# Patient Record
Sex: MALE | Race: WHITE | HISPANIC OR LATINO | Employment: OTHER | URBAN - METROPOLITAN AREA
[De-identification: names, ages, dates, MRNs, and addresses within clinical notes are randomized per-mention and may not be internally consistent; named-entity substitution may affect disease eponyms.]

---

## 2017-09-21 ENCOUNTER — OFFICE VISIT (OUTPATIENT)
Dept: MEDICAL GROUP | Facility: MEDICAL CENTER | Age: 72
End: 2017-09-21
Payer: MEDICARE

## 2017-09-21 VITALS
WEIGHT: 179.23 LBS | BODY MASS INDEX: 25.66 KG/M2 | RESPIRATION RATE: 14 BRPM | OXYGEN SATURATION: 97 % | HEART RATE: 60 BPM | HEIGHT: 70 IN | TEMPERATURE: 97.6 F

## 2017-09-21 DIAGNOSIS — Z13.6 SCREENING FOR CARDIOVASCULAR CONDITION: ICD-10-CM

## 2017-09-21 DIAGNOSIS — Z86.69 H/O RETINAL DETACHMENT: ICD-10-CM

## 2017-09-21 DIAGNOSIS — Z00.00 HEALTH CARE MAINTENANCE: ICD-10-CM

## 2017-09-21 DIAGNOSIS — N40.0 BPH WITHOUT URINARY OBSTRUCTION: ICD-10-CM

## 2017-09-21 DIAGNOSIS — K21.9 GASTROESOPHAGEAL REFLUX DISEASE WITHOUT ESOPHAGITIS: ICD-10-CM

## 2017-09-21 DIAGNOSIS — K58.0 IRRITABLE BOWEL SYNDROME WITH DIARRHEA: ICD-10-CM

## 2017-09-21 DIAGNOSIS — Z12.11 COLON CANCER SCREENING: ICD-10-CM

## 2017-09-21 DIAGNOSIS — E78.5 HYPERLIPIDEMIA, UNSPECIFIED HYPERLIPIDEMIA TYPE: ICD-10-CM

## 2017-09-21 DIAGNOSIS — B19.20 HEPATITIS C VIRUS INFECTION, UNSPECIFIED CHRONICITY: ICD-10-CM

## 2017-09-21 PROCEDURE — G0008 ADMIN INFLUENZA VIRUS VAC: HCPCS | Performed by: FAMILY MEDICINE

## 2017-09-21 PROCEDURE — 90662 IIV NO PRSV INCREASED AG IM: CPT | Performed by: FAMILY MEDICINE

## 2017-09-21 PROCEDURE — 99203 OFFICE O/P NEW LOW 30 MIN: CPT | Mod: 25 | Performed by: FAMILY MEDICINE

## 2017-09-21 RX ORDER — LOPERAMIDE HYDROCHLORIDE 2 MG/1
2 CAPSULE ORAL 2 TIMES DAILY
Qty: 180 CAP | Refills: 1 | Status: SHIPPED | OUTPATIENT
Start: 2017-09-21 | End: 2018-05-31 | Stop reason: SDUPTHER

## 2017-09-21 RX ORDER — DOXAZOSIN 2 MG/1
2 TABLET ORAL DAILY
COMMUNITY
End: 2017-09-21 | Stop reason: SDUPTHER

## 2017-09-21 RX ORDER — RANITIDINE 150 MG/1
150 TABLET ORAL 2 TIMES DAILY
Qty: 180 TAB | Refills: 1 | Status: SHIPPED | OUTPATIENT
Start: 2017-09-21

## 2017-09-21 RX ORDER — LOPERAMIDE HYDROCHLORIDE 2 MG/1
2 CAPSULE ORAL 4 TIMES DAILY PRN
COMMUNITY
End: 2017-09-21 | Stop reason: SDUPTHER

## 2017-09-21 RX ORDER — DOXAZOSIN 2 MG/1
2 TABLET ORAL 2 TIMES DAILY
Qty: 180 TAB | Refills: 1 | Status: SHIPPED | OUTPATIENT
Start: 2017-09-21 | End: 2018-04-10 | Stop reason: SDUPTHER

## 2017-09-21 RX ORDER — RANITIDINE 150 MG/1
150 TABLET ORAL 2 TIMES DAILY
COMMUNITY
End: 2017-09-21 | Stop reason: SDUPTHER

## 2017-09-21 ASSESSMENT — PATIENT HEALTH QUESTIONNAIRE - PHQ9: CLINICAL INTERPRETATION OF PHQ2 SCORE: 0

## 2017-09-21 NOTE — PROGRESS NOTES
CC: Establish a new PCP.    HPI:  Josh presents today to establish a new PCP.     Patient has just unlocked from [facility]. Has been active, and independent with all ADLs. Has the following medical issues    BPH without urinary obstruction, has been having less urinary symptoms with doxazosin    Gastroesophageal reflux disease, he has been doing fine on Zantac.Denies epigastric pain, and heart burn.    Hyperlipidemia, he was on Simvastatin in the past was stopped because his lipid panel had improved. Has been on diet control.    Has h/o Hep C, but patient stated that he was told it was a misdiagnosis. Has been having normal LFTs.Has past h/o drug abuse.    Irritable bowel syndrome , has been having intermittent episodes, mostly associated with diarrhea. Currently asymptomatic, but wants to have antidiarrheal always available because he gets the epiode couple of times a month.Currently denies abdominal pain, diarrhea, or constipation    Has h/o retinal detachment which affected his left eye sight. He wants a follow up with ophthalmologist.     Due for flu shot, and pneumonia vaccine. Had colonoscopy 3 yrs ago, was normal.          Patient Active Problem List    Diagnosis Date Noted   • BPH (benign prostatic hyperplasia) 05/02/2012   • COPD (chronic obstructive pulmonary disease) (CMS-HCC) 05/02/2012   • Numbness and tingling of both legs 05/23/2011   • Lumbosacral radiculopathy 05/23/2011   • Neuropathic pain 05/23/2011   • Hyperlipidemia 12/21/2010   • Migraine 09/22/2010   • PVD (peripheral vascular disease) (CMS-HCC) 09/22/2010   • Tobacco abuse 08/18/2009   • HCV (hepatitis C virus) 08/18/2009   • IVDU (intravenous drug user) 08/18/2009   • GERD (gastroesophageal reflux disease) 08/18/2009       Current Outpatient Prescriptions   Medication Sig Dispense Refill   • aspirin EC (ECOTRIN) 81 MG Tablet Delayed Response Take 81 mg by mouth every day.     • loperamide (IMODIUM) 2 MG Cap  "Take 1 Cap by mouth 2 Times a Day. 180 Cap 1   • ranitidine (ZANTAC) 150 MG Tab Take 1 Tab by mouth 2 times a day. 180 Tab 1   • doxazosin (CARDURA) 2 MG Tab Take 1 Tab by mouth 2 Times a Day. 180 Tab 1   • simvastatin (ZOCOR) 20 MG TABS TAKE ONE TABLET BY MOUTH IN THE EVENING 30 Each 5   • SPIRIVA HANDIHALER 18 MCG CAPS INHALE THE CONTENTS OF ONE CAPSULE EVERY DAY 90 Each 0   • trazodone (DESYREL) 100 MG TABS Take 100 mg by mouth every evening.       No current facility-administered medications for this visit.          Allergies as of 09/21/2017 - Reviewed 09/21/2017   Allergen Reaction Noted   • Nkda [no known drug allergy]  10/25/2007        Social History     Social History   • Marital status: Single     Spouse name: N/A   • Number of children: N/A   • Years of education: N/A     Occupational History   • Not on file.     Social History Main Topics   • Smoking status: Current Every Day Smoker     Packs/day: 0.25     Types: Cigarettes   • Smokeless tobacco: Never Used   • Alcohol use No      Comment: drinks ETOH occasionally   • Drug use: No   • Sexual activity: Not on file     Other Topics Concern   • Not on file     Social History Narrative    ** Merged History Encounter **            Family History   Problem Relation Age of Onset   • Diabetes Mother    • Diabetes Father        Past Surgical History:   Procedure Laterality Date   • CHOLECYSTECTOMY      4 years ago   • OPEN REDUCTION      ribs    • OTHER      gerd   • OTHER      catarract bilat 6/2012       ROS:  Denies any Headache, Blurred Vision, Confusion Chest pain,  Shortness of breath,  Abdominal pain, Changes of bowel or bladder, Lower ext edema, Fevers, Nights sweats, Weight Changes, Focal weakness or numbness.  All other systems are negative.    Pulse 60   Temp 36.4 °C (97.6 °F)   Resp 14   Ht 1.778 m (5' 10\")   Wt 81.3 kg (179 lb 3.7 oz)   SpO2 97%   BMI 25.72 kg/m²     Physical Exam:  Gen:         Alert and oriented, No apparent " distress.  HEENT:   Perrla, TM clear,  Oralpharynx no erythema or exudates.  Neck:       No Jugular venous distension, Lymphadenopathy, Thyromegaly, Bruits.  Lungs:     Clear to auscultation bilaterally  CV:          Regular rate and rhythm. No murmurs, rubs or gallops.  Abd:         Soft non tender, non distended. Normal active bowel sounds. No hepatosplenomegaly, No pulsatile masses.  Ext:          No clubbing, cyanosis, edema.      Assessment and Plan.   72 y.o. male     1. BPH without urinary obstruction  Less symptomatic with doxazosin    - doxazosin (CARDURA) 2 MG Tab; Take 1 Tab by mouth 2 Times a Day.  Dispense: 180 Tab; Refill: 1    2. Gastroesophageal reflux disease without esophagitis  Doing fine on Zantac.    - ranitidine (ZANTAC) 150 MG Tab; Take 1 Tab by mouth 2 times a day.  Dispense: 180 Tab; Refill: 1    3. Hyperlipidemia, unspecified hyperlipidemia type  Was on Simvastatin in the past was stopped because his lipid panel had improved. Will repeat lipid panel.  Patient is counseled about life style modification.    - LIPID PANEL  - TSH; Future    4. Hepatitis C virus infection, unspecified chronicity  Has h/o Hep C, but patient stated that he was told it was a misdiagnosis. Has been having normal LFTs.  Will repeat HCV antibody, if positive will do the quantitative RNA.    - HEP C VIRUS ANTIBODY; Future  - COMP METABOLIC PANEL; Future    5. Irritable bowel syndrome with diarrhea  Intermittent, mostly associated with diarrhea. Currently asymptomatic, but wants to have antidiarrheal always available because he gets the epiode couple of times a month.    - loperamide (IMODIUM) 2 MG Cap; Take 1 Cap by mouth 2 Times a Day.  Dispense: 180 Cap; Refill: 1    6. Health care maintenance  Due for flu shot.  Due for pneumonia vaccine, will give it next visit.  Had colonoscopy 3 yrs ago, was normal.    - INFLUENZA VACCINE, HIGH DOSE (65+ ONLY)    7. Screening for cardiovascular condition    - CBC WITH  DIFFERENTIAL; Future  - COMP METABOLIC PANEL; Future  - LIPID PANEL  - TSH; Future    8. H/O retinal detachment  Wants a follow up with ophthalmologist. Mild eye sight on the left eye.    - REFERRAL TO OPHTHALMOLOGY    9. Colon cancer screening    - OCCULT BLOOD FECES IMMUNOASSAY (FIT); Future

## 2017-09-28 ENCOUNTER — HOSPITAL ENCOUNTER (OUTPATIENT)
Dept: LAB | Facility: MEDICAL CENTER | Age: 72
End: 2017-09-28
Attending: FAMILY MEDICINE
Payer: MEDICARE

## 2017-09-28 DIAGNOSIS — Z13.6 SCREENING FOR CARDIOVASCULAR CONDITION: ICD-10-CM

## 2017-09-28 DIAGNOSIS — B19.20 HEPATITIS C VIRUS INFECTION, UNSPECIFIED CHRONICITY: ICD-10-CM

## 2017-09-28 DIAGNOSIS — E78.5 HYPERLIPIDEMIA, UNSPECIFIED HYPERLIPIDEMIA TYPE: ICD-10-CM

## 2017-09-28 LAB
ALBUMIN SERPL BCP-MCNC: 3.9 G/DL (ref 3.2–4.9)
ALBUMIN/GLOB SERPL: 1 G/DL
ALP SERPL-CCNC: 45 U/L (ref 30–99)
ALT SERPL-CCNC: 18 U/L (ref 2–50)
ANION GAP SERPL CALC-SCNC: 8 MMOL/L (ref 0–11.9)
AST SERPL-CCNC: 18 U/L (ref 12–45)
BASOPHILS # BLD AUTO: 1.1 % (ref 0–1.8)
BASOPHILS # BLD: 0.08 K/UL (ref 0–0.12)
BILIRUB SERPL-MCNC: 0.7 MG/DL (ref 0.1–1.5)
BUN SERPL-MCNC: 22 MG/DL (ref 8–22)
CALCIUM SERPL-MCNC: 9.6 MG/DL (ref 8.5–10.5)
CHLORIDE SERPL-SCNC: 106 MMOL/L (ref 96–112)
CHOLEST SERPL-MCNC: 195 MG/DL (ref 100–199)
CO2 SERPL-SCNC: 25 MMOL/L (ref 20–33)
CREAT SERPL-MCNC: 1.02 MG/DL (ref 0.5–1.4)
EOSINOPHIL # BLD AUTO: 0.16 K/UL (ref 0–0.51)
EOSINOPHIL NFR BLD: 2.2 % (ref 0–6.9)
ERYTHROCYTE [DISTWIDTH] IN BLOOD BY AUTOMATED COUNT: 45.8 FL (ref 35.9–50)
GFR SERPL CREATININE-BSD FRML MDRD: >60 ML/MIN/1.73 M 2
GLOBULIN SER CALC-MCNC: 3.8 G/DL (ref 1.9–3.5)
GLUCOSE SERPL-MCNC: 90 MG/DL (ref 65–99)
HCT VFR BLD AUTO: 48.6 % (ref 42–52)
HCV AB SER QL: REACTIVE
HDLC SERPL-MCNC: 45 MG/DL
HGB BLD-MCNC: 16.5 G/DL (ref 14–18)
IMM GRANULOCYTES # BLD AUTO: 0.05 K/UL (ref 0–0.11)
IMM GRANULOCYTES NFR BLD AUTO: 0.7 % (ref 0–0.9)
LDLC SERPL CALC-MCNC: 123 MG/DL
LYMPHOCYTES # BLD AUTO: 2.35 K/UL (ref 1–4.8)
LYMPHOCYTES NFR BLD: 32 % (ref 22–41)
MCH RBC QN AUTO: 31.7 PG (ref 27–33)
MCHC RBC AUTO-ENTMCNC: 34 G/DL (ref 33.7–35.3)
MCV RBC AUTO: 93.3 FL (ref 81.4–97.8)
MONOCYTES # BLD AUTO: 0.44 K/UL (ref 0–0.85)
MONOCYTES NFR BLD AUTO: 6 % (ref 0–13.4)
NEUTROPHILS # BLD AUTO: 4.26 K/UL (ref 1.82–7.42)
NEUTROPHILS NFR BLD: 58 % (ref 44–72)
NRBC # BLD AUTO: 0 K/UL
NRBC BLD AUTO-RTO: 0 /100 WBC
PLATELET # BLD AUTO: 257 K/UL (ref 164–446)
PMV BLD AUTO: 11.3 FL (ref 9–12.9)
POTASSIUM SERPL-SCNC: 3.6 MMOL/L (ref 3.6–5.5)
PROT SERPL-MCNC: 7.7 G/DL (ref 6–8.2)
RBC # BLD AUTO: 5.21 M/UL (ref 4.7–6.1)
SODIUM SERPL-SCNC: 139 MMOL/L (ref 135–145)
TRIGL SERPL-MCNC: 136 MG/DL (ref 0–149)
TSH SERPL DL<=0.005 MIU/L-ACNC: 1.35 UIU/ML (ref 0.3–3.7)
WBC # BLD AUTO: 7.3 K/UL (ref 4.8–10.8)

## 2017-09-28 PROCEDURE — 36415 COLL VENOUS BLD VENIPUNCTURE: CPT

## 2017-09-28 PROCEDURE — 80061 LIPID PANEL: CPT

## 2017-09-28 PROCEDURE — 87522 HEPATITIS C REVRS TRNSCRPJ: CPT

## 2017-09-28 PROCEDURE — 86803 HEPATITIS C AB TEST: CPT

## 2017-09-28 PROCEDURE — 85025 COMPLETE CBC W/AUTO DIFF WBC: CPT

## 2017-09-28 PROCEDURE — 80053 COMPREHEN METABOLIC PANEL: CPT

## 2017-09-28 PROCEDURE — 84443 ASSAY THYROID STIM HORMONE: CPT

## 2017-10-02 LAB
HCV RNA SERPL NAA+PROBE-ACNC: <15 IU/ML
HCV RNA SERPL NAA+PROBE-LOG IU: <1.2 LOG IU
HCV RNA SERPL QL NAA+PROBE: NOT DETECTED
PATHOLOGY STUDY: NORMAL

## 2017-12-21 ENCOUNTER — OFFICE VISIT (OUTPATIENT)
Dept: MEDICAL GROUP | Facility: MEDICAL CENTER | Age: 72
End: 2017-12-21
Payer: MEDICARE

## 2017-12-21 VITALS
HEART RATE: 85 BPM | RESPIRATION RATE: 16 BRPM | HEIGHT: 70 IN | OXYGEN SATURATION: 96 % | DIASTOLIC BLOOD PRESSURE: 72 MMHG | SYSTOLIC BLOOD PRESSURE: 142 MMHG | WEIGHT: 192 LBS | TEMPERATURE: 97 F | BODY MASS INDEX: 27.49 KG/M2

## 2017-12-21 DIAGNOSIS — J41.0 SMOKERS' COUGH (HCC): ICD-10-CM

## 2017-12-21 DIAGNOSIS — R05.9 COUGH: ICD-10-CM

## 2017-12-21 PROCEDURE — 99214 OFFICE O/P EST MOD 30 MIN: CPT | Performed by: FAMILY MEDICINE

## 2017-12-21 RX ORDER — GUAIFENESIN AND DEXTROMETHORPHAN HYDROBROMIDE 100; 10 MG/5ML; MG/5ML
10 SOLUTION ORAL EVERY 6 HOURS PRN
Qty: 560 ML | Refills: 0 | Status: SHIPPED | OUTPATIENT
Start: 2017-12-21 | End: 2018-02-05

## 2017-12-21 RX ORDER — PREDNISONE 10 MG/1
TABLET ORAL
Qty: 5 TAB | Refills: 0 | Status: SHIPPED | OUTPATIENT
Start: 2017-12-21 | End: 2017-12-25

## 2017-12-21 RX ORDER — AZITHROMYCIN 250 MG/1
TABLET, FILM COATED ORAL
Qty: 6 TAB | Refills: 0 | Status: SHIPPED | OUTPATIENT
Start: 2017-12-21 | End: 2018-02-05

## 2017-12-21 NOTE — PROGRESS NOTES
CC: Productive cough    HPI:   Josh presents today because he has been having cough for a week, it has been productive, with green thick phlegm, has been having subjective fever only in the first few days of the condition. Denies SOB, ad chest pain. He is a chronic smoker.Has been smoking for more than 50 yrs between 8-12 cig a day.Has not been having problems with his breathing before.      Patient Active Problem List    Diagnosis Date Noted   • BPH (benign prostatic hyperplasia) 05/02/2012   • COPD (chronic obstructive pulmonary disease) (CMS-HCC) 05/02/2012   • Numbness and tingling of both legs 05/23/2011   • Lumbosacral radiculopathy 05/23/2011   • Neuropathic pain 05/23/2011   • Hyperlipidemia 12/21/2010   • Migraine 09/22/2010   • PVD (peripheral vascular disease) (CMS-HCC) 09/22/2010   • Tobacco abuse 08/18/2009   • HCV (hepatitis C virus) 08/18/2009   • IVDU (intravenous drug user) 08/18/2009   • GERD (gastroesophageal reflux disease) 08/18/2009       Current Outpatient Prescriptions   Medication Sig Dispense Refill   • azithromycin (ZITHROMAX) 250 MG Tab 500 mg for one day, then 250 mg every day for 4 days. 6 Tab 0   • predniSONE (DELTASONE) 10 MG Tab Take 10 mg daily for 5 days. 5 Tab 0   • Dextromethorphan-Guaifenesin (TUSSIN DM)  MG/5ML Syrup Take 10 mL by mouth every 6 hours as needed. 560 mL 0   • aspirin EC (ECOTRIN) 81 MG Tablet Delayed Response Take 81 mg by mouth every day.     • loperamide (IMODIUM) 2 MG Cap Take 1 Cap by mouth 2 Times a Day. 180 Cap 1   • ranitidine (ZANTAC) 150 MG Tab Take 1 Tab by mouth 2 times a day. 180 Tab 1   • doxazosin (CARDURA) 2 MG Tab Take 1 Tab by mouth 2 Times a Day. 180 Tab 1   • simvastatin (ZOCOR) 20 MG TABS TAKE ONE TABLET BY MOUTH IN THE EVENING 30 Each 5   • SPIRIVA HANDIHALER 18 MCG CAPS INHALE THE CONTENTS OF ONE CAPSULE EVERY DAY 90 Each 0   • trazodone (DESYREL) 100 MG TABS Take 100 mg by mouth every evening.       No current facility-administered  "medications for this visit.          Allergies as of 12/21/2017 - Reviewed 12/21/2017   Allergen Reaction Noted   • Nkda [no known drug allergy]  10/25/2007        ROS: Denies any chest pain, Shortness of breath, Changes bowel or bladder, Lower extremity edema.    Physical Exam:  /72   Pulse 85   Temp 36.1 °C (97 °F)   Resp 16   Ht 1.778 m (5' 10\")   Wt 87.1 kg (192 lb)   SpO2 96%   BMI 27.55 kg/m²   Gen.: Well-developed, well-nourished, no apparent distress,pleasant and cooperative with the examination  Skin:  Warm and dry with good turgor. No rashes or suspicious lesions in visible areas  HEENT:Sinuses nontender with palpation, TMs clear, nares patent with pink mucosa and clear rhinorrhea,no septal deviation ,polyps or lesions. lips without lesions, oropharynx clear.  Neck: Trachea midline,no masses or adenopathy.   Cor: Regular rate and rhythm without murmur, gallop or rub.  Lungs: Respirations unlabored.Congested lungs, slightly decreased breath sounds bilaterally, however no wheezes, or rhonchi.  Extremities: No cyanosis, clubbing or edema.          Assessment and Plan.   72 y.o. male     1. Smokers' cough (CMS-HCC)  Probably acute bronchitis.  Oral antibiotic, steroid.  Cough suppressant.  Rest  Hydration.  RTC/ER if it persist, or gets worse.  Probably with associated COPD ( has been smoking for more than 50 yrs between 8-12 cig a day). Frank send him for PFT.    - azithromycin (ZITHROMAX) 250 MG Tab; 500 mg for one day, then 250 mg every day for 4 days.  Dispense: 6 Tab; Refill: 0  - predniSONE (DELTASONE) 10 MG Tab; Take 10 mg daily for 5 days.  Dispense: 5 Tab; Refill: 0  - Dextromethorphan-Guaifenesin (TUSSIN DM)  MG/5ML Syrup; Take 10 mL by mouth every 6 hours as needed.  Dispense: 560 mL; Refill: 0  - PULMONARY FUNCTION TESTS Test requested: Complete Pulmonary Function Test      "

## 2018-01-17 ENCOUNTER — APPOINTMENT (OUTPATIENT)
Dept: OTHER | Facility: MEDICAL CENTER | Age: 73
End: 2018-01-17
Payer: MEDICARE

## 2018-02-05 ENCOUNTER — HOSPITAL ENCOUNTER (OUTPATIENT)
Dept: OTHER | Facility: MEDICAL CENTER | Age: 73
End: 2018-02-05
Attending: FAMILY MEDICINE
Payer: MEDICARE

## 2018-02-05 ENCOUNTER — OFFICE VISIT (OUTPATIENT)
Dept: MEDICAL GROUP | Facility: MEDICAL CENTER | Age: 73
End: 2018-02-05
Payer: MEDICARE

## 2018-02-05 VITALS
RESPIRATION RATE: 15 BRPM | BODY MASS INDEX: 28.92 KG/M2 | WEIGHT: 202 LBS | HEART RATE: 72 BPM | OXYGEN SATURATION: 96 % | TEMPERATURE: 97.6 F | SYSTOLIC BLOOD PRESSURE: 124 MMHG | DIASTOLIC BLOOD PRESSURE: 84 MMHG | HEIGHT: 70 IN

## 2018-02-05 DIAGNOSIS — E78.2 MIXED HYPERLIPIDEMIA: ICD-10-CM

## 2018-02-05 DIAGNOSIS — I73.9 PVD (PERIPHERAL VASCULAR DISEASE) (HCC): ICD-10-CM

## 2018-02-05 DIAGNOSIS — Z00.00 HEALTH CARE MAINTENANCE: ICD-10-CM

## 2018-02-05 DIAGNOSIS — B19.20 HEPATITIS C VIRUS INFECTION WITHOUT HEPATIC COMA, UNSPECIFIED CHRONICITY: ICD-10-CM

## 2018-02-05 DIAGNOSIS — K21.9 GASTROESOPHAGEAL REFLUX DISEASE WITHOUT ESOPHAGITIS: ICD-10-CM

## 2018-02-05 DIAGNOSIS — J41.0 SMOKERS' COUGH (HCC): ICD-10-CM

## 2018-02-05 DIAGNOSIS — Z12.11 COLON CANCER SCREENING: ICD-10-CM

## 2018-02-05 PROCEDURE — 99214 OFFICE O/P EST MOD 30 MIN: CPT | Mod: 25 | Performed by: FAMILY MEDICINE

## 2018-02-05 PROCEDURE — 94729 DIFFUSING CAPACITY: CPT | Mod: 26 | Performed by: INTERNAL MEDICINE

## 2018-02-05 PROCEDURE — G0009 ADMIN PNEUMOCOCCAL VACCINE: HCPCS | Performed by: FAMILY MEDICINE

## 2018-02-05 PROCEDURE — 94726 PLETHYSMOGRAPHY LUNG VOLUMES: CPT | Mod: 26 | Performed by: INTERNAL MEDICINE

## 2018-02-05 PROCEDURE — 90732 PPSV23 VACC 2 YRS+ SUBQ/IM: CPT | Performed by: FAMILY MEDICINE

## 2018-02-05 PROCEDURE — 94060 EVALUATION OF WHEEZING: CPT

## 2018-02-05 PROCEDURE — 94726 PLETHYSMOGRAPHY LUNG VOLUMES: CPT

## 2018-02-05 PROCEDURE — 94729 DIFFUSING CAPACITY: CPT

## 2018-02-05 PROCEDURE — 94060 EVALUATION OF WHEEZING: CPT | Mod: 26 | Performed by: INTERNAL MEDICINE

## 2018-02-05 RX ORDER — SIMVASTATIN 10 MG
10 TABLET ORAL EVERY EVENING
Qty: 90 TAB | Refills: 1 | Status: SHIPPED | OUTPATIENT
Start: 2018-02-05 | End: 2018-07-31 | Stop reason: SDUPTHER

## 2018-02-05 ASSESSMENT — PULMONARY FUNCTION TESTS
FEV1/FVC_PERCENT_PREDICTED: 102
FVC_PERCENT_PREDICTED: 120
FEV1_PERCENT_CHANGE: -2
FEV1/FVC_PERCENT_LLN: 63
FEV1/FVC_PERCENT_CHANGE: 5
FEV1/FVC_PREDICTED: 76
FEV1: 3.58
FVC: 4.62
FEV1/FVC_PERCENT_LLN: 63
FEV1/FVC_PERCENT_PREDICTED: 101
FEV1_PREDICTED: 2.92
FVC_PERCENT_PREDICTED: 123
FVC_PREDICTED: 3.83
FEV1_PERCENT_PREDICTED: 122
FEV1_LLN: 2.44
FVC_LLN: 3.20
FEV1_LLN: 2.44
FEV1/FVC: 77.49
FEV1/FVC: 77
FEV1/FVC: 74
FEV1_PERCENT_CHANGE: 3
FEV1/FVC_PERCENT_PREDICTED: 96
FEV1/FVC_PERCENT_CHANGE: -150
FEV1/FVC_PERCENT_PREDICTED: 76
FVC_LLN: 3.20
FEV1/FVC: 73
FEV1/FVC_PERCENT_PREDICTED: 96
FVC: 4.72
FEV1_PERCENT_PREDICTED: 118
FEV1: 3.47

## 2018-02-05 NOTE — PROGRESS NOTES
CC: Hep C/ GERD/ HLD/ H/O PVD    HPI:   Josh presents today to discuss the following medical issues:    Hepatitis C virus infection without hepatic coma  Hep C antibody is reactive, but the PCR is negative . Has been having normal LFTs.Has h/o drug abuse,has been sober for many years.Drink alcohol only socially.    Gastroesophageal reflux disease without esophagitis  He has been asymptomatic. Denies epigastric pain, and heart burn. Has been on Ranitidine 150 mg bid.    Mixed hyperlipidemia  He has been tolerating the statin. Denies muscle pain LFTs has been normal, has been on Simvastatin 10 mg daily.    Peripheral vascular disease (CMS-HCC)  Patient stated that he was diagnosed with mild PVD when hge was in the [facility] , but he has been asymptomatic since he stopped the smoking. Has been on statin as well.    Due for PPSV 23, and colon cancer screening.    Patient Active Problem List    Diagnosis Date Noted   • BPH (benign prostatic hyperplasia) 05/02/2012   • COPD (chronic obstructive pulmonary disease) (CMS-HCC) 05/02/2012   • Numbness and tingling of both legs 05/23/2011   • Lumbosacral radiculopathy 05/23/2011   • Neuropathic pain 05/23/2011   • Hyperlipidemia 12/21/2010   • Migraine 09/22/2010   • PVD (peripheral vascular disease) (CMS-HCC) 09/22/2010   • Tobacco abuse 08/18/2009   • HCV (hepatitis C virus) 08/18/2009   • IVDU (intravenous drug user) 08/18/2009   • GERD (gastroesophageal reflux disease) 08/18/2009       Current Outpatient Prescriptions   Medication Sig Dispense Refill   • aspirin EC (ECOTRIN) 81 MG Tablet Delayed Response Take 81 mg by mouth every day.     • loperamide (IMODIUM) 2 MG Cap Take 1 Cap by mouth 2 Times a Day. 180 Cap 1   • ranitidine (ZANTAC) 150 MG Tab Take 1 Tab by mouth 2 times a day. 180 Tab 1   • doxazosin (CARDURA) 2 MG Tab Take 1 Tab by mouth 2 Times a Day. 180 Tab 1   • azithromycin (ZITHROMAX) 250 MG Tab 500 mg for one day, then 250 mg every day for 4 days. 6 Tab 0   •  "Dextromethorphan-Guaifenesin (TUSSIN DM)  MG/5ML Syrup Take 10 mL by mouth every 6 hours as needed. 560 mL 0   • simvastatin (ZOCOR) 20 MG TABS TAKE ONE TABLET BY MOUTH IN THE EVENING 30 Each 5   • SPIRIVA HANDIHALER 18 MCG CAPS INHALE THE CONTENTS OF ONE CAPSULE EVERY DAY 90 Each 0   • trazodone (DESYREL) 100 MG TABS Take 100 mg by mouth every evening.       No current facility-administered medications for this visit.          Allergies as of 02/05/2018 - Reviewed 02/05/2018   Allergen Reaction Noted   • Nkda [no known drug allergy]  10/25/2007        ROS: Denies any chest pain, Shortness of breath, Changes bowel or bladder, Lower extremity edema.    Physical Exam:  /84   Pulse 72   Temp 36.4 °C (97.6 °F)   Resp 15   Ht 1.778 m (5' 10\")   Wt 91.6 kg (202 lb)   SpO2 96%   BMI 28.98 kg/m²   Gen.: Well-developed, well-nourished, no apparent distress,pleasant and cooperative with the examination  Skin:  Warm and dry with good turgor. No rashes or suspicious lesions in visible areas  HEENT:Sinuses nontender with palpation, TMs clear, nares patent with pink mucosa and clear rhinorrhea,no septal deviation ,polyps or lesions. lips without lesions, oropharynx clear.  Neck: Trachea midline,no masses or adenopathy. No JVD.  Cor: Regular rate and rhythm without murmur, gallop or rub.  Lungs: Respirations unlabored.Clear to auscultation with equal breath sounds bilaterally. No wheezes, rhonchi.  Extremities: No cyanosis, clubbing or edema.            Assessment and Plan.   72 y.o. male     1. Hepatitis C virus infection without hepatic coma, unspecified chronicity  Hep C ab is reactive, but the PCR is negative . Has been having normal LFTs.  Will continue watch. Will send for a trial of Hep C treatment if PCR is positive.    2. Gastroesophageal reflux disease without esophagitis  Stable, doing fine on Ranitidine 150 mg bid.    3. Mixed hyperlipidemia  He has been tolerating the statin. Denies muscle pain LFTs " has been normal  Continue on Simvastatin 10 mg daily    4. PVD (peripheral vascular disease) (CMS-HCC)  Patient stated that he was diagnosed with mild PVD when hge was in the group home , but he has been asymptomatic since he stopped the smoking. Has been on statin as well.    5. Smokers' cough (CMS-HCC)  Resolved since he stopped  Smoking.    6. Health care maintenance    - PNEUMOCOCCAL POLYSACCHARIDE VACCINE 23-VALENT =>1YO SQ/IM    7. Colon cancer screening    - OCCULT BLOOD FECES IMMUNOASSAY (FIT); Future

## 2018-02-06 NOTE — PROCEDURES
PFT INTERPRETATION    DATE OF EXAMINATION:  02/05/2018    DATE OF INTERPRETATION:  02/05/2018, interpreted by Dr. Gondal.    INTERPRETATION:  The spirometry is not indicative of an obstructive or   restrictive ventilatory defect with normal ratio of FEV1 to forced vital   capacity.  Normal FEV1 and forced vital capacity, pre and post bronchodilator.    The lung volumes are also not suggestive of air trapping or hyperinflation   with normal total lung capacity and residual volume.  The diffusion capacity   of the lungs for carbon monoxide is also in the normal range.    IMPRESSION:  Normal study, correlate clinically.       ____________________________________     Muhammad K. Gondal, MD [facility] / JORGE    DD:  02/05/2018 17:20:59  DT:  02/05/2018 20:11:09    D#:  [id number]  Job#:  [id number]

## 2018-04-10 DIAGNOSIS — N40.0 BPH WITHOUT URINARY OBSTRUCTION: ICD-10-CM

## 2018-04-10 RX ORDER — DOXAZOSIN 2 MG/1
TABLET ORAL
Qty: 180 TAB | Refills: 1 | Status: SHIPPED | OUTPATIENT
Start: 2018-04-10 | End: 2018-10-10 | Stop reason: SDUPTHER

## 2018-05-07 ENCOUNTER — APPOINTMENT (OUTPATIENT)
Dept: MEDICAL GROUP | Facility: MEDICAL CENTER | Age: 73
End: 2018-05-07
Payer: MEDICARE

## 2018-05-31 ENCOUNTER — OFFICE VISIT (OUTPATIENT)
Dept: MEDICAL GROUP | Facility: MEDICAL CENTER | Age: 73
End: 2018-05-31
Payer: MEDICARE

## 2018-05-31 VITALS
TEMPERATURE: 98.6 F | HEIGHT: 70 IN | HEART RATE: 69 BPM | DIASTOLIC BLOOD PRESSURE: 70 MMHG | SYSTOLIC BLOOD PRESSURE: 120 MMHG | BODY MASS INDEX: 29.49 KG/M2 | WEIGHT: 206 LBS | RESPIRATION RATE: 16 BRPM | OXYGEN SATURATION: 95 %

## 2018-05-31 DIAGNOSIS — K58.0 IRRITABLE BOWEL SYNDROME WITH DIARRHEA: ICD-10-CM

## 2018-05-31 DIAGNOSIS — I73.9 PVD (PERIPHERAL VASCULAR DISEASE) (HCC): ICD-10-CM

## 2018-05-31 DIAGNOSIS — F17.200 TOBACCO DEPENDENCE: ICD-10-CM

## 2018-05-31 DIAGNOSIS — E78.2 MIXED HYPERLIPIDEMIA: ICD-10-CM

## 2018-05-31 DIAGNOSIS — N40.0 BENIGN PROSTATIC HYPERPLASIA WITHOUT LOWER URINARY TRACT SYMPTOMS: ICD-10-CM

## 2018-05-31 DIAGNOSIS — K21.9 GASTROESOPHAGEAL REFLUX DISEASE WITHOUT ESOPHAGITIS: ICD-10-CM

## 2018-05-31 PROCEDURE — 99214 OFFICE O/P EST MOD 30 MIN: CPT | Performed by: FAMILY MEDICINE

## 2018-05-31 RX ORDER — LOPERAMIDE HYDROCHLORIDE 2 MG/1
2 CAPSULE ORAL 2 TIMES DAILY
Qty: 180 CAP | Refills: 1 | Status: SHIPPED | OUTPATIENT
Start: 2018-05-31 | End: 2020-05-04

## 2018-05-31 NOTE — PROGRESS NOTES
CC: PVD, HLD, GERD, BPH, smoking, IBS.    HPI:   Josh presents today to discuss the following medical issues:    Peripheral vascular disease (CMS-[facility])   Patient stated that he was diagnosed with mild P[facility] , but he has been asymptomatic. Has been on statin as well. Still smokes but he intend to stop, wait for his wife so they can do it together.    Mixed hyperlipidemia   He has been tolerating the statin. Denies muscle pain LFTs has been normal, has been on Simvastatin 10 mg daily.     Gastroesophageal reflux disease without esophagitis   He has been asymptomatic. Denies epigastric pain, and heart burn. Has been on Ranitidine 150 mg bid.     Benign prostatic hyperplasia without lower urinary tract symptoms  Has been almost no urinary symptoms since he started the  Doxazosin.    Tobacco dependence  Smokes 1/2 a pack a day, has been thinking of smoking cessation, he will try the patches.Currently cough, and SOB    Irritable bowel syndrome with diarrhea  His IBS usually presented with pain and diarrhea, and the Imodium as needed has been helping . He is currently asymptomatic, he knows what to eat, and how to avoid IBS episodes.    Patient Active Problem List    Diagnosis Date Noted   • Irritable bowel syndrome with diarrhea 05/31/2018   • BPH (benign prostatic hyperplasia) 05/02/2012   • COPD (chronic obstructive pulmonary disease) ([facility]) 05/02/2012   • Numbness and tingling of both legs 05/23/2011   • Lumbosacral radiculopathy 05/23/2011   • Neuropathic pain 05/23/2011   • Hyperlipidemia 12/21/2010   • Migraine 09/22/2010   • PVD (peripheral vascular disease) ([facility]) 09/22/2010   • Tobacco abuse 08/18/2009   • HCV (hepatitis C virus) 08/18/2009   • IVDU (intravenous drug user) 08/18/2009   • GERD (gastroesophageal reflux disease) 08/18/2009       Current Outpatient Prescriptions   Medication Sig Dispense Refill   • doxazosin (CARDURA) 2 MG Tab TAKE ONE TABLET BY MOUTH TWICE DAILY 180 Tab 1   •  "simvastatin (ZOCOR) 10 MG Tab Take 1 Tab by mouth every evening. 90 Tab 1   • aspirin EC (ECOTRIN) 81 MG Tablet Delayed Response Take 81 mg by mouth every day.     • loperamide (IMODIUM) 2 MG Cap Take 1 Cap by mouth 2 Times a Day. 180 Cap 1   • ranitidine (ZANTAC) 150 MG Tab Take 1 Tab by mouth 2 times a day. 180 Tab 1     No current facility-administered medications for this visit.          Allergies as of 05/31/2018 - Reviewed 05/31/2018   Allergen Reaction Noted   • Nkda [no known drug allergy]  10/25/2007        ROS: Denies any chest pain, Shortness of breath, Changes bowel or bladder, Lower extremity edema.    Physical Exam:  /70   Pulse 69   Temp 37 °C (98.6 °F)   Resp 16   Ht 1.778 m (5' 10\")   Wt 93.4 kg (206 lb)   SpO2 95%   BMI 29.56 kg/m²   Gen.: Well-developed, well-nourished, no apparent distress,pleasant and cooperative with the examination  Skin:  Warm and dry with good turgor. No rashes or suspicious lesions in visible areas  HEENT:Sinuses nontender with palpation, TMs clear, nares patent with pink mucosa and clear rhinorrhea,no septal deviation ,polyps or lesions. lips without lesions, oropharynx clear.  Neck: Trachea midline,no masses or adenopathy. No JVD.  Cor: Regular rate and rhythm without murmur, gallop or rub.  Lungs: Respirations unlabored.Clear to auscultation with equal breath sounds bilaterally. No wheezes, rhonchi.  Extremities: No cyanosis, clubbing or edema.      Assessment and Plan.   72 y.o. male     1. PVD (peripheral vascular disease) (HCC)  Patient stated that he was diagnosed with mild PVD when hge was in the [facility] , but he has been asymptomatic since he stopped the smoking. Has been on statin as well    2. Mixed hyperlipidemia  Patient stated that he was diagnosed with mild PVD when hge was in the [facility] , but he has been asymptomatic since he stopped the smoking. Has been on statin as well    3. Gastroesophageal reflux disease without esophagitis  Stable, doing " fine on Ranitidine 150 mg bid.    4. Benign prostatic hyperplasia without lower urinary tract symptoms  Has been almost asymptomatic with the Doxazosin.    5. Tobacco dependence  Smokes 1/2 a pack a day, has been thinking of smoking cessation, he will try the patches.    6. Irritable bowel syndrome with diarrhea  Currently asymptomatic.  Imodium as needed has been helping

## 2018-07-31 DIAGNOSIS — I73.9 PVD (PERIPHERAL VASCULAR DISEASE) (HCC): ICD-10-CM

## 2018-07-31 DIAGNOSIS — E78.2 MIXED HYPERLIPIDEMIA: ICD-10-CM

## 2018-07-31 RX ORDER — SIMVASTATIN 10 MG
TABLET ORAL
Qty: 90 TAB | Refills: 3 | Status: SHIPPED | OUTPATIENT
Start: 2018-07-31 | End: 2019-04-25 | Stop reason: SDUPTHER

## 2018-09-17 ENCOUNTER — OFFICE VISIT (OUTPATIENT)
Dept: MEDICAL GROUP | Facility: MEDICAL CENTER | Age: 73
End: 2018-09-17
Payer: MEDICARE

## 2018-09-17 VITALS
OXYGEN SATURATION: 95 % | SYSTOLIC BLOOD PRESSURE: 128 MMHG | WEIGHT: 207.4 LBS | HEIGHT: 69 IN | DIASTOLIC BLOOD PRESSURE: 68 MMHG | BODY MASS INDEX: 30.72 KG/M2 | TEMPERATURE: 98.3 F | RESPIRATION RATE: 16 BRPM | HEART RATE: 86 BPM

## 2018-09-17 DIAGNOSIS — J44.9 CHRONIC OBSTRUCTIVE PULMONARY DISEASE, UNSPECIFIED COPD TYPE (HCC): ICD-10-CM

## 2018-09-17 DIAGNOSIS — Z23 NEED FOR VACCINATION: ICD-10-CM

## 2018-09-17 DIAGNOSIS — E66.9 OBESITY (BMI 30-39.9): ICD-10-CM

## 2018-09-17 DIAGNOSIS — E78.2 MIXED HYPERLIPIDEMIA: ICD-10-CM

## 2018-09-17 DIAGNOSIS — K21.9 GASTROESOPHAGEAL REFLUX DISEASE WITHOUT ESOPHAGITIS: ICD-10-CM

## 2018-09-17 DIAGNOSIS — N40.0 BENIGN PROSTATIC HYPERPLASIA WITHOUT LOWER URINARY TRACT SYMPTOMS: ICD-10-CM

## 2018-09-17 DIAGNOSIS — Z12.11 COLON CANCER SCREENING: ICD-10-CM

## 2018-09-17 PROCEDURE — 99214 OFFICE O/P EST MOD 30 MIN: CPT | Mod: 25 | Performed by: FAMILY MEDICINE

## 2018-09-17 PROCEDURE — 90662 IIV NO PRSV INCREASED AG IM: CPT | Performed by: FAMILY MEDICINE

## 2018-09-17 PROCEDURE — G0008 ADMIN INFLUENZA VIRUS VAC: HCPCS | Performed by: FAMILY MEDICINE

## 2018-09-17 NOTE — PROGRESS NOTES
CC: GERD, HLD, BPH, COPD    HPI:   Josh presents today to discuss the following:     Gastroesophageal reflux disease without esophagitis   He has been asymptomatic. Denies epigastric pain, and heart burn. Has been on Ranitidine 150 mg bid.      Mixed hyperlipidemia   He has been tolerating the statin. Denies muscle pain LFTs has been normal, has been on Simvastatin 10 mg daily. Patient has h/o PVD .    Benign prostatic hyperplasia without lower urinary tract symptoms  Patient stated that he has been almost has no urinary symptoms since he started the  Doxazosin.     Chronic obstructive pulmonary disease, unspecified COPD type (HCC)  Patient is asymptomatic .In the past he used Albuterol for his cough and SOB . But he has not for long time. Denies cough and SOB.he smokes 1/2 a pack a day, has been thinking of smoking cessation, he will try the patches.    Obesity (BMI 30-39.9)  BMI is 31.Patient is counseled about the medical rationale for weight loss in obese individuals is that obesity is associated with a significant increase in mortality, and many health risks including type 2 diabetes mellitus, hypertension, dyslipidemia, and coronary heart disease. The benefits of weight loss include a reduction in the rate of progression from impaired glucose tolerance to diabetes, blood pressure in hypertensive patients, and lipid levels in higher risk patients. Other noncardiac benefits of weight loss include reductions in urinary incontinence, sleep apnea, and depression, and improvements in quality of life, physical functioning, and mobility.Recommend lifestyle modification: exercise 30 minutes per day 5 days per week. Recommend also portion control.    Due for the flu shot.  Due for Colon cancer screen.    Patient Active Problem List    Diagnosis Date Noted   • Irritable bowel syndrome with diarrhea 05/31/2018   • BPH (benign prostatic hyperplasia) 05/02/2012   • COPD (chronic obstructive pulmonary disease) (HCC)  "05/02/2012   • Numbness and tingling of both legs 05/23/2011   • Lumbosacral radiculopathy 05/23/2011   • Neuropathic pain 05/23/2011   • Hyperlipidemia 12/21/2010   • Migraine 09/22/2010   • PVD (peripheral vascular disease) (HCC) 09/22/2010   • Tobacco abuse 08/18/2009   • HCV (hepatitis C virus) 08/18/2009   • IVDU (intravenous drug user) 08/18/2009   • GERD (gastroesophageal reflux disease) 08/18/2009       Current Outpatient Prescriptions   Medication Sig Dispense Refill   • simvastatin (ZOCOR) 10 MG Tab TAKE ONE TABLET BY MOUTH ONCE DAILY IN THE EVENING 90 Tab 3   • loperamide (IMODIUM) 2 MG Cap Take 1 Cap by mouth 2 Times a Day. 180 Cap 1   • doxazosin (CARDURA) 2 MG Tab TAKE ONE TABLET BY MOUTH TWICE DAILY 180 Tab 1   • aspirin EC (ECOTRIN) 81 MG Tablet Delayed Response Take 81 mg by mouth every day.     • ranitidine (ZANTAC) 150 MG Tab Take 1 Tab by mouth 2 times a day. 180 Tab 1     No current facility-administered medications for this visit.          Allergies as of 09/17/2018 - Reviewed 09/17/2018   Allergen Reaction Noted   • Nkda [no known drug allergy]  10/25/2007        ROS: Denies any chest pain, Shortness of breath, Changes bowel or bladder, Lower extremity edema.    Physical Exam:  /68   Pulse 86   Temp 36.8 °C (98.3 °F)   Resp 16   Ht 1.74 m (5' 8.5\")   Wt 94.1 kg (207 lb 6.4 oz)   SpO2 95%   BMI 31.08 kg/m²   Gen.: Well-developed, well-nourished, no apparent distress,pleasant and cooperative with the examination  Skin:  Warm and dry with good turgor. No rashes or suspicious lesions in visible areas  HEENT:Sinuses nontender with palpation, TMs clear, nares patent with pink mucosa and clear rhinorrhea,no septal deviation ,polyps or lesions. lips without lesions, oropharynx clear.  Neck: Trachea midline,no masses or adenopathy. No JVD.  Cor: Regular rate and rhythm without murmur, gallop or rub.  Lungs: Respirations unlabored.Clear to auscultation with equal breath sounds bilaterally. " No wheezes, rhonchi.  Extremities: No cyanosis, clubbing or edema.      Assessment and Plan.   73 y.o. male     1. Gastroesophageal reflux disease without esophagitis  Has been moslty asymptomatic with Zantac 150 mg bid.    2. Mixed hyperlipidemia  He has been tolerating the statin. Denies muscle pain LFTs has been normal  Continue on Simvastatin 10 mg daily.    3. Benign prostatic hyperplasia without lower urinary tract symptoms  Has been doing fine on the Doxazosin.    4. Chronic obstructive pulmonary disease, unspecified COPD type (HCC)  Mostly asymptomatic.  In the past he used Albuterol . He has not for long time      5. Tobacco dependence  Smokes 1/2 a pack a day, has tired to quit he could not. Now is not ready.    5. Need for vaccination    - INFLUENZA VACCINE, HIGH DOSE (65+ ONLY)    6. Obesity (BMI 30-39.9)  BMI is 31.  Patient is counseled about life style modification.    - Patient identified as having weight management issue.  Appropriate orders and counseling given.    7. Colon cancer screening    - OCCULT BLOOD FECES IMMUNOASSAY (FIT); Future

## 2018-09-21 ENCOUNTER — APPOINTMENT (OUTPATIENT)
Dept: LAB | Facility: MEDICAL CENTER | Age: 73
End: 2018-09-21
Attending: FAMILY MEDICINE
Payer: MEDICARE

## 2018-10-10 DIAGNOSIS — N40.0 BPH WITHOUT URINARY OBSTRUCTION: ICD-10-CM

## 2018-10-11 RX ORDER — DOXAZOSIN 2 MG/1
TABLET ORAL
Qty: 180 TAB | Refills: 3 | Status: SHIPPED | OUTPATIENT
Start: 2018-10-11 | End: 2019-10-27 | Stop reason: SDUPTHER

## 2019-01-15 ENCOUNTER — OFFICE VISIT (OUTPATIENT)
Dept: MEDICAL GROUP | Facility: MEDICAL CENTER | Age: 74
End: 2019-01-15
Payer: MEDICARE

## 2019-01-15 VITALS
RESPIRATION RATE: 16 BRPM | SYSTOLIC BLOOD PRESSURE: 130 MMHG | TEMPERATURE: 98.4 F | BODY MASS INDEX: 32.13 KG/M2 | OXYGEN SATURATION: 96 % | HEIGHT: 68 IN | WEIGHT: 212 LBS | HEART RATE: 82 BPM | DIASTOLIC BLOOD PRESSURE: 70 MMHG

## 2019-01-15 DIAGNOSIS — E78.2 MIXED HYPERLIPIDEMIA: ICD-10-CM

## 2019-01-15 DIAGNOSIS — J44.1 CHRONIC OBSTRUCTIVE PULMONARY DISEASE WITH ACUTE EXACERBATION (HCC): ICD-10-CM

## 2019-01-15 DIAGNOSIS — N40.0 BENIGN PROSTATIC HYPERPLASIA WITHOUT LOWER URINARY TRACT SYMPTOMS: ICD-10-CM

## 2019-01-15 DIAGNOSIS — I73.9 PVD (PERIPHERAL VASCULAR DISEASE) (HCC): ICD-10-CM

## 2019-01-15 DIAGNOSIS — F17.200 TOBACCO DEPENDENCE: ICD-10-CM

## 2019-01-15 PROCEDURE — 99214 OFFICE O/P EST MOD 30 MIN: CPT | Performed by: FAMILY MEDICINE

## 2019-01-15 PROCEDURE — 8041 PR SCP AHA: Performed by: FAMILY MEDICINE

## 2019-01-15 RX ORDER — ALBUTEROL SULFATE 90 UG/1
2 AEROSOL, METERED RESPIRATORY (INHALATION) EVERY 6 HOURS PRN
Qty: 8.5 G | Refills: 3 | Status: SHIPPED | OUTPATIENT
Start: 2019-01-15 | End: 2020-03-26

## 2019-01-15 RX ORDER — METHYLPREDNISOLONE 4 MG/1
TABLET ORAL
Qty: 21 TAB | Refills: 0 | Status: SHIPPED | OUTPATIENT
Start: 2019-01-15 | End: 2021-03-01

## 2019-01-15 ASSESSMENT — PATIENT HEALTH QUESTIONNAIRE - PHQ9: CLINICAL INTERPRETATION OF PHQ2 SCORE: 0

## 2019-01-15 NOTE — PROGRESS NOTES
Annual Health Assessment Questions:    1.  Are you currently engaging in any exercise or physical activity? Yes    2.  How would you describe your mood or emotional well-being today? good    3.  Have you had any falls in the last year? No    4.  Have you noticed any problems with your balance or had difficulty walking? No    5.  In the last six months have you experienced any leakage of urine? No    6. DPA/Advanced Directive: Patient does not have an Advanced Directive.  A packet and workshop information was given on Advanced Directives.         CC: COPD with exacerbation, peripheral vascular disease, hyperlipidemia, benign prostatic hypertrophy.    HPI:   Josh presents today to discuss the following medical issues:    PVD (peripheral vascular disease) ([facility])  Patient has been having pain when he walks about 2 blocks.  Denies any rest pain.  He was diagnosed with peripheral vascular disease 2 years ago with arterial ultrasound, which is not shown in the records.  He has been on aspirin and statin.  He is a chronic smoker.  He smokes about 5-6 cigarettes a day, he used to smoke a pack a day and he cut down gradually to current numbers of cigarettes.  Patient started smoking when he was 15 years old.  Smoking cessation discussed patient stated that he has been trying to do that but because he is wife is a smoker every time he quit he did relapses.    Benign prostatic hyperplasia without lower urinary tract symptoms  Patient's urinary symptoms (urgency, frequency, incomplete emptying of the bladder ) has improved since he started doxazosin 2 mg daily.    Mixed hyperlipidemia  He has been tolerating the statin. Denies muscle pain LFTs has been normal, has been on simvastatin 10 mg daily.  Has history of peripheral vascular disease.    Chronic obstructive pulmonary disease with acute exacerbation (HCC)  Patient has been having cough for about 3 weeks.  Started cough with thick phlegm that gradually cleared up in 3-5  days, however the cough continues with clear phlegm, associated with shortness of breath especially at night.  Denies any fever, chest pain, chills.  He has been using over-the-counter cough suppressant which has been helping a little bit.  In the past he used albuterol and Spiriva but he stopped it because he did not think that he needed it.he has been having normal oxygen saturation, he is not on oxygen.  He is a chronic smoker , he smokes about 5-6 cigarettes a day, he used to smoke a pack a day and he cut down gradually to current numbers of cigarettes.  Patient started smoking when he was 15 years old.  Smoking cessation discussed patient stated that he has been trying to do that but because he is wife is a smoker every time he quit he did relapses.    Patient Active Problem List    Diagnosis Date Noted   • Obesity (BMI 30-39.9) 09/17/2018   • Irritable bowel syndrome with diarrhea 05/31/2018   • BPH (benign prostatic hyperplasia) 05/02/2012   • COPD (chronic obstructive pulmonary disease) ([facility]) 05/02/2012   • Numbness and tingling of both legs 05/23/2011   • Lumbosacral radiculopathy 05/23/2011   • Neuropathic pain 05/23/2011   • Hyperlipidemia 12/21/2010   • Migraine 09/22/2010   • PVD (peripheral vascular disease) ([facility]) 09/22/2010   • Tobacco abuse 08/18/2009   • HCV (hepatitis C virus) 08/18/2009   • IVDU (intravenous drug user) 08/18/2009   • GERD (gastroesophageal reflux disease) 08/18/2009       Current Outpatient Prescriptions   Medication Sig Dispense Refill   • MethylPREDNISolone (MEDROL DOSEPAK) 4 MG Tablet Therapy Pack As directed on the packaging label. 21 Tab 0   • albuterol 108 (90 Base) MCG/ACT Aero Soln inhalation aerosol Inhale 2 Puffs by mouth every 6 hours as needed for Shortness of Breath. 8.5 g 3   • doxazosin (CARDURA) 2 MG Tab TAKE 1 TABLET BY MOUTH TWICE DAILY 180 Tab 3   • simvastatin (ZOCOR) 10 MG Tab TAKE ONE TABLET BY MOUTH ONCE DAILY IN THE EVENING 90 Tab 3   • loperamide (IMODIUM)  "2 MG Cap Take 1 Cap by mouth 2 Times a Day. 180 Cap 1   • aspirin EC (ECOTRIN) 81 MG Tablet Delayed Response Take 81 mg by mouth every day.     • ranitidine (ZANTAC) 150 MG Tab Take 1 Tab by mouth 2 times a day. 180 Tab 1     No current facility-administered medications for this visit.          Allergies as of 01/15/2019 - Reviewed 09/17/2018   Allergen Reaction Noted   • Nkda [no known drug allergy]  10/25/2007        ROS: Denies any chest pain, Shortness of breath, Changes bowel or bladder, Lower extremity edema.    Physical Exam:  /70 (BP Location: Right arm, Patient Position: Sitting, BP Cuff Size: Adult)   Pulse 82   Temp 36.9 °C (98.4 °F) (Temporal)   Resp 16   Ht 1.727 m (5' 8\")   Wt 96.2 kg (212 lb)   SpO2 96%   BMI 32.23 kg/m²   Gen.: Well-developed, well-nourished, no apparent distress,pleasant and cooperative with the examination  Skin:  Warm and dry with good turgor. No rashes or suspicious lesions in visible areas  HEENT:Sinuses nontender with palpation, TMs clear, nares patent with pink mucosa and clear rhinorrhea,no septal deviation ,polyps or lesions. lips without lesions, oropharynx clear.  Neck: Trachea midline,no masses or adenopathy. No JVD.  Cor: Regular rate and rhythm without murmur, gallop or rub.  Lungs: Respirations unlabored.decreased breath sounds bilaterally.  Scattered wheezes, no crackles.  Extremities: No cyanosis, clubbing or edema.  Decreased peripheral pulses.          Assessment and Plan.   73 y.o. male     1. PVD (peripheral vascular disease) (HCC)  Stable.  Diagnosed 2 years ago with arterial ultrasound, not shown in the records.  However patient still having claudication as described above.  Continue on aspirin and statin.  Will do arterial ultrasound.    - US-EXTREMITY ARTERY LOWER BILAT; Future    2. Benign prostatic hyperplasia without lower urinary tract symptoms  Patient has been doing fine on doxazosin 2 mg daily.    3. Mixed hyperlipidemia  He has been " tolerating the statin. Denies muscle pain LFTs has been normal  Continue on simvastatin 10 mg daily.    4. Chronic obstructive pulmonary disease with acute exacerbation (HCC)  Start patient on oral steroids tapering dose for a week, and albuterol inhaler every 6 hours for 2-3 days then every 6 hours as needed.  No sign of infection, so no need for antibiotics.  Continue on over-the-counter cough suppressant.    - MethylPREDNISolone (MEDROL DOSEPAK) 4 MG Tablet Therapy Pack; As directed on the packaging label.  Dispense: 21 Tab; Refill: 0  - albuterol 108 (90 Base) MCG/ACT Aero Soln inhalation aerosol; Inhale 2 Puffs by mouth every 6 hours as needed for Shortness of Breath.  Dispense: 8.5 g; Refill: 3    5. Tobacco dependence  Patient smokes about 5-6 cigarettes a day, he used to smoke a pack a day and he cut down gradually to current numbers of cigarettes.  Patient started smoking when he was 15 years old.  Smoking cessation discussed patient stated that he has been trying to do that but because he is wife is a smoker every time he quit he did relapses.

## 2019-01-22 ENCOUNTER — APPOINTMENT (OUTPATIENT)
Dept: RADIOLOGY | Facility: MEDICAL CENTER | Age: 74
End: 2019-01-22
Attending: FAMILY MEDICINE
Payer: MEDICARE

## 2019-04-16 ENCOUNTER — OFFICE VISIT (OUTPATIENT)
Dept: MEDICAL GROUP | Facility: MEDICAL CENTER | Age: 74
End: 2019-04-16
Payer: MEDICARE

## 2019-04-16 VITALS
HEART RATE: 65 BPM | DIASTOLIC BLOOD PRESSURE: 80 MMHG | OXYGEN SATURATION: 96 % | BODY MASS INDEX: 31.83 KG/M2 | RESPIRATION RATE: 16 BRPM | TEMPERATURE: 98 F | HEIGHT: 68 IN | SYSTOLIC BLOOD PRESSURE: 130 MMHG | WEIGHT: 210 LBS

## 2019-04-16 DIAGNOSIS — R09.89 BRUIT OF LEFT CAROTID ARTERY: ICD-10-CM

## 2019-04-16 DIAGNOSIS — Z72.0 TOBACCO ABUSE: ICD-10-CM

## 2019-04-16 DIAGNOSIS — J44.9 CHRONIC OBSTRUCTIVE PULMONARY DISEASE, UNSPECIFIED COPD TYPE (HCC): ICD-10-CM

## 2019-04-16 DIAGNOSIS — I73.9 PVD (PERIPHERAL VASCULAR DISEASE) (HCC): ICD-10-CM

## 2019-04-16 DIAGNOSIS — K21.9 GASTROESOPHAGEAL REFLUX DISEASE WITHOUT ESOPHAGITIS: ICD-10-CM

## 2019-04-16 PROCEDURE — 99214 OFFICE O/P EST MOD 30 MIN: CPT | Performed by: FAMILY MEDICINE

## 2019-04-16 NOTE — PROGRESS NOTES
CC: COPD, acid reflux, peripheral vascular disease, carotid bruit.    HPI:   Josh presents today with the following chronic medical issues:    Chronic obstructive pulmonary disease, unspecified COPD type ([facility])  Patient is currently mostly asymptomatic.  However sometimes he gets mild cough and shortness of breath early in the morning.  Has been taking albuterol every day but he stated that he really does not have severe symptoms.  Patient smokes 6 cigarettes a day, smoking cessation discussed, patient will continue trying.    Gastroesophageal reflux disease without esophagitis  Denies epigastric pain, heartburn, nausea, vomiting.  Patient has been doing fine on ranitidine 150 mg twice a day.    PVD (peripheral vascular disease) ([facility])  Patient has been having intermittent leg pain with walking about 2 blocks.  He was diagnosed 2 years ago with arterial ultrasound, not shown in the records.  Denies any change in his skin color.  He smokes about 5-6 cigarettes a day, he used to smoke a pack a day and he cut down gradually to current numbers of cigarettes.  Patient started smoking when he was 15 years old.  Smoking cessation discussed patient stated that he has been trying to do that but because he is wife is a smoker every time he quit he did relapses.  Ultrasound of the leg arteries ordered last visit but patient did not do it.  Patient stated that his wife got sick and he could not do it.    Bruit of left carotid artery  Accidental finding of left carotid bruit detected on exam.  However patient denies any dizziness, syncopal episodes.      Patient Active Problem List    Diagnosis Date Noted   • Obesity (BMI 30-39.9) 09/17/2018   • Irritable bowel syndrome with diarrhea 05/31/2018   • BPH (benign prostatic hyperplasia) 05/02/2012   • COPD (chronic obstructive pulmonary disease) ([facility]) 05/02/2012   • Numbness and tingling of both legs 05/23/2011   • Lumbosacral radiculopathy 05/23/2011   • Neuropathic pain 05/23/2011  "  • Hyperlipidemia 12/21/2010   • Migraine 09/22/2010   • PVD (peripheral vascular disease) (HCC) 09/22/2010   • Tobacco abuse 08/18/2009   • HCV (hepatitis C virus) 08/18/2009   • IVDU (intravenous drug user) 08/18/2009   • GERD (gastroesophageal reflux disease) 08/18/2009       Current Outpatient Prescriptions   Medication Sig Dispense Refill   • fluticasone-salmeterol (ADVAIR) 100-50 MCG/DOSE AEROSOL POWDER, BREATH ACTIVATED Inhale 1 Puff by mouth every 12 hours. 1 Inhaler 3   • MethylPREDNISolone (MEDROL DOSEPAK) 4 MG Tablet Therapy Pack As directed on the packaging label. (Patient not taking: Reported on 4/16/2019) 21 Tab 0   • albuterol 108 (90 Base) MCG/ACT Aero Soln inhalation aerosol Inhale 2 Puffs by mouth every 6 hours as needed for Shortness of Breath. 8.5 g 3   • doxazosin (CARDURA) 2 MG Tab TAKE 1 TABLET BY MOUTH TWICE DAILY 180 Tab 3   • simvastatin (ZOCOR) 10 MG Tab TAKE ONE TABLET BY MOUTH ONCE DAILY IN THE EVENING 90 Tab 3   • loperamide (IMODIUM) 2 MG Cap Take 1 Cap by mouth 2 Times a Day. 180 Cap 1   • aspirin EC (ECOTRIN) 81 MG Tablet Delayed Response Take 81 mg by mouth every day.     • ranitidine (ZANTAC) 150 MG Tab Take 1 Tab by mouth 2 times a day. 180 Tab 1     No current facility-administered medications for this visit.          Allergies as of 04/16/2019 - Reviewed 04/16/2019   Allergen Reaction Noted   • Nkda [no known drug allergy]  10/25/2007        ROS: Denies any chest pain, Shortness of breath, Changes bowel or bladder, Lower extremity edema.    Physical Exam:  /80 (BP Location: Right arm, Patient Position: Sitting, BP Cuff Size: Adult)   Pulse 65   Temp 36.7 °C (98 °F) (Temporal)   Resp 16   Ht 1.727 m (5' 8\")   Wt 95.3 kg (210 lb)   SpO2 96%   BMI 31.93 kg/m²   Gen.: Well-developed, well-nourished, no apparent distress,pleasant and cooperative with the examination  Skin:  Warm and dry with good turgor. No rashes or suspicious lesions in visible areas  HEENT:Sinuses " nontender with palpation, TMs clear, nares patent with pink mucosa and clear rhinorrhea,no septal deviation ,polyps or lesions. lips without lesions, oropharynx clear.  Neck: Trachea midline,no masses or adenopathy. No JVD.  Left carotid bruit  Cor: Regular rate and rhythm without murmur, gallop or rub.  Lungs: Respirations unlabored.Clear to auscultation with equal breath sounds bilaterally. No wheezes, rhonchi.  Extremities: No cyanosis, clubbing or edema. Decreased peripheral pulses.      Assessment and Plan.   73 y.o. male     1. Chronic obstructive pulmonary disease, unspecified COPD type (HCC)  Stable.  Continue albuterol as needed, patient has been taking it once a day.  Patient advised to take albuterol as needed, and will add Advair twice a day.    - fluticasone-salmeterol (ADVAIR) 100-50 MCG/DOSE AEROSOL POWDER, BREATH ACTIVATED; Inhale 1 Puff by mouth every 12 hours.  Dispense: 1 Inhaler; Refill: 3    2. Tobacco abuse  Smokes 6 cigarettes a day, smoking cessation discussed, patient will continue trying.    3. Gastroesophageal reflux disease without esophagitis  Patient has been doing fine on ranitidine 150 mg twice a day.    4. PVD (peripheral vascular disease) ([facility])  Intermittent leg pain with walking.  Patient was diagnosed 2 years ago with arterial ultrasound, not shown in the records.  Continue on aspirin and statin.  Will do arterial ultrasound, was ordered last visit, but patient did not do it.    - US-EXTREMITY ARTERY LOWER BILAT W/PRASANTH (COMBO); Future    5. Bruit of left carotid artery  Patient had finding, asymptomatic.  Rule out carotid artery stenosis.    - US-CAROTID DOPPLER BILAT; Future

## 2019-04-25 DIAGNOSIS — I73.9 PVD (PERIPHERAL VASCULAR DISEASE) (HCC): ICD-10-CM

## 2019-04-25 DIAGNOSIS — E78.2 MIXED HYPERLIPIDEMIA: ICD-10-CM

## 2019-04-25 RX ORDER — SIMVASTATIN 10 MG
10 TABLET ORAL EVERY EVENING
Qty: 100 TAB | Refills: 3 | Status: SHIPPED | OUTPATIENT
Start: 2019-04-25 | End: 2020-05-26

## 2019-04-30 ENCOUNTER — HOSPITAL ENCOUNTER (OUTPATIENT)
Dept: RADIOLOGY | Facility: MEDICAL CENTER | Age: 74
End: 2019-04-30
Attending: FAMILY MEDICINE
Payer: MEDICARE

## 2019-04-30 DIAGNOSIS — I73.9 PVD (PERIPHERAL VASCULAR DISEASE) (HCC): ICD-10-CM

## 2019-04-30 DIAGNOSIS — R09.89 BRUIT OF LEFT CAROTID ARTERY: ICD-10-CM

## 2019-04-30 PROCEDURE — 93922 UPR/L XTREMITY ART 2 LEVELS: CPT | Mod: 26 | Performed by: SURGERY

## 2019-04-30 PROCEDURE — 93925 LOWER EXTREMITY STUDY: CPT | Mod: 26 | Performed by: SURGERY

## 2019-04-30 PROCEDURE — 93880 EXTRACRANIAL BILAT STUDY: CPT

## 2019-04-30 PROCEDURE — 93880 EXTRACRANIAL BILAT STUDY: CPT | Mod: 26 | Performed by: SURGERY

## 2019-04-30 PROCEDURE — 93922 UPR/L XTREMITY ART 2 LEVELS: CPT

## 2019-04-30 PROCEDURE — 93925 LOWER EXTREMITY STUDY: CPT

## 2019-05-06 ENCOUNTER — OFFICE VISIT (OUTPATIENT)
Dept: MEDICAL GROUP | Facility: MEDICAL CENTER | Age: 74
End: 2019-05-06
Payer: MEDICARE

## 2019-05-06 VITALS
OXYGEN SATURATION: 94 % | TEMPERATURE: 99.4 F | SYSTOLIC BLOOD PRESSURE: 140 MMHG | WEIGHT: 211 LBS | RESPIRATION RATE: 16 BRPM | BODY MASS INDEX: 31.98 KG/M2 | DIASTOLIC BLOOD PRESSURE: 80 MMHG | HEIGHT: 68 IN | HEART RATE: 81 BPM

## 2019-05-06 DIAGNOSIS — I73.9 PVD (PERIPHERAL VASCULAR DISEASE) (HCC): ICD-10-CM

## 2019-05-06 PROCEDURE — 99214 OFFICE O/P EST MOD 30 MIN: CPT | Performed by: FAMILY MEDICINE

## 2019-05-06 NOTE — PROGRESS NOTES
CC: Bilateral leg pain    HPI:   Josh presents today with bilateral leg pain. Patient has been having intermittent leg pain with walking about 2 blocks.  He was diagnosed 2 years ago with arterial ultrasound, not shown in the records.  Denies any change in his skin color.  He smokes about 5-6 cigarettes a day, he used to smoke a pack a day and he cut down gradually to current numbers of cigarettes.  So patient was sent to arterial ultrasound which showed:   1) Severe (>75%) right distal superficial femoral arterial stenosis.   2) Moderate (50-75%) left mid superficial femoral arterial stenosis.      Patient Active Problem List    Diagnosis Date Noted   • Obesity (BMI 30-39.9) 09/17/2018   • Irritable bowel syndrome with diarrhea 05/31/2018   • BPH (benign prostatic hyperplasia) 05/02/2012   • COPD (chronic obstructive pulmonary disease) ([facility]) 05/02/2012   • Numbness and tingling of both legs 05/23/2011   • Lumbosacral radiculopathy 05/23/2011   • Neuropathic pain 05/23/2011   • Hyperlipidemia 12/21/2010   • Migraine 09/22/2010   • PVD (peripheral vascular disease) ([facility]) 09/22/2010   • Tobacco abuse 08/18/2009   • HCV (hepatitis C virus) 08/18/2009   • IVDU (intravenous drug user) 08/18/2009   • GERD (gastroesophageal reflux disease) 08/18/2009       Current Outpatient Prescriptions   Medication Sig Dispense Refill   • simvastatin (ZOCOR) 10 MG Tab Take 1 Tab by mouth every evening. 100 Tab 3   • fluticasone-salmeterol (ADVAIR) 100-50 MCG/DOSE AEROSOL POWDER, BREATH ACTIVATED Inhale 1 Puff by mouth every 12 hours. 1 Inhaler 3   • MethylPREDNISolone (MEDROL DOSEPAK) 4 MG Tablet Therapy Pack As directed on the packaging label. (Patient not taking: Reported on 4/16/2019) 21 Tab 0   • albuterol 108 (90 Base) MCG/ACT Aero Soln inhalation aerosol Inhale 2 Puffs by mouth every 6 hours as needed for Shortness of Breath. 8.5 g 3   • doxazosin (CARDURA) 2 MG Tab TAKE 1 TABLET BY MOUTH TWICE DAILY 180 Tab 3   • loperamide  "(IMODIUM) 2 MG Cap Take 1 Cap by mouth 2 Times a Day. 180 Cap 1   • aspirin EC (ECOTRIN) 81 MG Tablet Delayed Response Take 81 mg by mouth every day.     • ranitidine (ZANTAC) 150 MG Tab Take 1 Tab by mouth 2 times a day. 180 Tab 1     No current facility-administered medications for this visit.          Allergies as of 05/06/2019 - Reviewed 05/06/2019   Allergen Reaction Noted   • Nkda [no known drug allergy]  10/25/2007        ROS: Denies any chest pain, Shortness of breath, Changes bowel or bladder, Lower extremity edema.    Physical Exam:  /80 (BP Location: Right arm, Patient Position: Sitting, BP Cuff Size: Adult)   Pulse 81   Temp 37.4 °C (99.4 °F) (Temporal)   Resp 16   Ht 1.727 m (5' 8\")   Wt 95.7 kg (211 lb)   SpO2 94%   BMI 32.08 kg/m²   Gen.: Well-developed, well-nourished, no apparent distress,pleasant and cooperative with the examination.  Lungs: Respirations unlabored.Clear to auscultation with equal breath sounds bilaterally. No wheezes, rhonchi.  Extremities: No cyanosis, clubbing or edema. Weak bilateral pulses.      No other physical exam is needed.     Assessment and Plan.   73 y.o. male     1. PVD (peripheral vascular disease) ([facility])  Arterial ultrasound showed:   1) Severe (>75%) right distal superficial femoral arterial stenosis.   2) Moderate (50-75%) left mid superficial femoral arterial stenosis.  Result is discussed with patient, recommended referral to vascular surgeon for further evaluation and management.    - REFERRAL TO VASCULAR SURGERY      "

## 2019-08-15 ENCOUNTER — TELEPHONE (OUTPATIENT)
Dept: MEDICAL GROUP | Facility: MEDICAL CENTER | Age: 74
End: 2019-08-15

## 2019-08-15 NOTE — TELEPHONE ENCOUNTER
Colorectal Care Gap Outreach    1. Confirmed patient is between the ages of 50-75: YES     2. Confirmed that patient IS overdue or due soon for colorectal cancer screening: YES     3. Were orders placed within the last 12 months to complete screening: YES     Phone Number Called: [phone] (home)     Call outcome: left message for patient to call back regarding message below    _____________________________________________________________________    Colon Cancer Screening Guidelines:     Important: If patient has any history of colon polyps or family history of colorectal cancer, FIT and Cologuard are NOT appropriate options. A colonoscopy is the recommended test for this set of patients.    • Colonoscopy  o Always recommend colonoscopy first.   o A colonoscopy is recommended over the other tests because it provides direct visualization of the colon and if there are small polyps these can also be removed with one procedure.  o If negative and no family history, could be cleared for 10 years.     • Cologuard/FIT  o Cologuard is completed once every 3 years.  o FIT is completed annually.  o If positive, Cologuard and FIT will require a diagnostic colonoscopy. Screening colonoscopies are classically covered by insurances, however, diagnostic colonoscopies may result in a bill.

## 2019-10-11 NOTE — TELEPHONE ENCOUNTER
1. Caller Name: Josh      Call Back Number: [phone] (home)         Patient approves a detailed voicemail message: N\A    2nd attempt- LVM for pt to call me back.

## 2019-10-14 ENCOUNTER — OFFICE VISIT (OUTPATIENT)
Dept: MEDICAL GROUP | Facility: MEDICAL CENTER | Age: 74
End: 2019-10-14
Payer: MEDICARE

## 2019-10-14 VITALS
OXYGEN SATURATION: 94 % | HEART RATE: 75 BPM | SYSTOLIC BLOOD PRESSURE: 126 MMHG | RESPIRATION RATE: 16 BRPM | HEIGHT: 68 IN | DIASTOLIC BLOOD PRESSURE: 66 MMHG | TEMPERATURE: 98.8 F | BODY MASS INDEX: 32.58 KG/M2 | WEIGHT: 215 LBS

## 2019-10-14 DIAGNOSIS — F17.200 TOBACCO DEPENDENCE: ICD-10-CM

## 2019-10-14 DIAGNOSIS — I73.9 PVD (PERIPHERAL VASCULAR DISEASE) (HCC): ICD-10-CM

## 2019-10-14 DIAGNOSIS — J44.9 CHRONIC OBSTRUCTIVE PULMONARY DISEASE, UNSPECIFIED COPD TYPE (HCC): ICD-10-CM

## 2019-10-14 DIAGNOSIS — J01.10 ACUTE NON-RECURRENT FRONTAL SINUSITIS: ICD-10-CM

## 2019-10-14 PROCEDURE — 99214 OFFICE O/P EST MOD 30 MIN: CPT | Performed by: FAMILY MEDICINE

## 2019-10-14 RX ORDER — DOXYCYCLINE HYCLATE 100 MG
100 TABLET ORAL 2 TIMES DAILY
Qty: 14 TAB | Refills: 0 | Status: SHIPPED | OUTPATIENT
Start: 2019-10-14 | End: 2019-10-21

## 2019-10-14 NOTE — PROGRESS NOTES
CC: COPD, sinusitis, vascular disease    HPI:   Josh presents today discuss the following medical issues    Chronic obstructive pulmonary disease, unspecified COPD type ([facility])  Denies cough or shortness of breath.  His oxygen saturation has been normal.  Has been doing fine on Advair twice a day and albuterol as needed.  Patient smokes about 8 cigarettes a day, will try to cut down gradually.    Acute non-recurrent frontal sinusitis  Patient has been having headache, pain over the frontal area close to the eyes, however she has been having normal eye movement, and some nasal congestion.  Has been having runny nose with some thick rhinorrhea.  Denies any fever, chest pain, shortness of breath.  We will start patient on doxycycline twice a day.    PVD (peripheral vascular disease) ([facility])  Last arterial ultrasound which showed:   1) Severe (>75%) right distal superficial femoral arterial stenosis.   2) Moderate (50-75%) left mid superficial femoral arterial stenosis.    Patient was seen by vascular surgeon, recommended conservative treatment(exercise, and aspirin.  Patient stated that pain with walking has improved a lot.      Patient Active Problem List    Diagnosis Date Noted   • Obesity (BMI 30-39.9) 09/17/2018   • Irritable bowel syndrome with diarrhea 05/31/2018   • BPH (benign prostatic hyperplasia) 05/02/2012   • COPD (chronic obstructive pulmonary disease) ([facility]) 05/02/2012   • Numbness and tingling of both legs 05/23/2011   • Lumbosacral radiculopathy 05/23/2011   • Neuropathic pain 05/23/2011   • Hyperlipidemia 12/21/2010   • Migraine 09/22/2010   • PVD (peripheral vascular disease) ([facility]) 09/22/2010   • Tobacco abuse 08/18/2009   • HCV (hepatitis C virus) 08/18/2009   • IVDU (intravenous drug user) 08/18/2009   • GERD (gastroesophageal reflux disease) 08/18/2009       Current Outpatient Medications   Medication Sig Dispense Refill   • doxycycline (VIBRAMYCIN) 100 MG Tab Take 1 Tab by mouth 2 times a day for 7  "days. 14 Tab 0   • simvastatin (ZOCOR) 10 MG Tab Take 1 Tab by mouth every evening. 100 Tab 3   • fluticasone-salmeterol (ADVAIR) 100-50 MCG/DOSE AEROSOL POWDER, BREATH ACTIVATED Inhale 1 Puff by mouth every 12 hours. 1 Inhaler 3   • MethylPREDNISolone (MEDROL DOSEPAK) 4 MG Tablet Therapy Pack As directed on the packaging label. (Patient not taking: Reported on 4/16/2019) 21 Tab 0   • albuterol 108 (90 Base) MCG/ACT Aero Soln inhalation aerosol Inhale 2 Puffs by mouth every 6 hours as needed for Shortness of Breath. 8.5 g 3   • doxazosin (CARDURA) 2 MG Tab TAKE 1 TABLET BY MOUTH TWICE DAILY 180 Tab 3   • loperamide (IMODIUM) 2 MG Cap Take 1 Cap by mouth 2 Times a Day. 180 Cap 1   • aspirin EC (ECOTRIN) 81 MG Tablet Delayed Response Take 81 mg by mouth every day.     • ranitidine (ZANTAC) 150 MG Tab Take 1 Tab by mouth 2 times a day. 180 Tab 1     No current facility-administered medications for this visit.          Allergies as of 10/14/2019 - Reviewed 10/14/2019   Allergen Reaction Noted   • Nkda [no known drug allergy]  10/25/2007        ROS: Denies any chest pain, Shortness of breath, Changes bowel or bladder, Lower extremity edema.    Physical Exam:  /66 (BP Location: Right arm, Patient Position: Sitting, BP Cuff Size: Adult)   Pulse 75   Temp 37.1 °C (98.8 °F) (Temporal)   Resp 16   Ht 1.727 m (5' 8\")   Wt 97.5 kg (215 lb)   SpO2 94%   BMI 32.69 kg/m²   Gen.: Well-developed, well-nourished, no apparent distress,pleasant and cooperative with the examination  Skin:  Warm and dry with good turgor. No rashes or suspicious lesions in visible areas  HEENT: Mild tenderness of the frontal sinus with palpation, TMs clear, nares patent with pink mucosa and clear rhinorrhea,no septal deviation ,polyps or lesions. lips without lesions, oropharynx clear.  Neck: Trachea midline,no masses or adenopathy. No JVD.  Cor: Regular rate and rhythm without murmur, gallop or rub.  Lungs: Respirations unlabored.Clear to " auscultation with equal breath sounds bilaterally. No wheezes, rhonchi.  Extremities: No cyanosis, clubbing or edema.  Decreased peripheral pulses bilaterally          Assessment and Plan.   74 y.o. male     1. Chronic obstructive pulmonary disease, unspecified COPD type ([facility])  Stable.  Continue Advair twice a day, and albuterol as needed.    2. Acute non-recurrent frontal sinusitis  We will start patient on doxycycline twice a day.  Recommend using normal saline nasal drops for irrigation.  Recommend taking Tylenol 650 for headache.  Return to the clinic if the condition gets worse.  - doxycycline (VIBRAMYCIN) 100 MG Tab; Take 1 Tab by mouth 2 times a day for 7 days.  Dispense: 14 Tab; Refill: 0    3. Tobacco dependence  Patient smokes about 8 cigarettes a day, will try to cut down gradually.    4. PVD (peripheral vascular disease) ([facility])  Was seen by vascular surgeon, recommended conservative treatment(exercise, and aspirin.

## 2019-10-27 DIAGNOSIS — N40.0 BPH WITHOUT URINARY OBSTRUCTION: ICD-10-CM

## 2019-10-28 RX ORDER — DOXAZOSIN 2 MG/1
TABLET ORAL
Qty: 180 TAB | Refills: 3 | Status: SHIPPED | OUTPATIENT
Start: 2019-10-28 | End: 2020-10-28

## 2019-12-20 DIAGNOSIS — J44.9 CHRONIC OBSTRUCTIVE PULMONARY DISEASE, UNSPECIFIED COPD TYPE (HCC): ICD-10-CM

## 2020-02-13 DIAGNOSIS — J44.9 CHRONIC OBSTRUCTIVE PULMONARY DISEASE, UNSPECIFIED COPD TYPE (HCC): ICD-10-CM

## 2020-03-20 DIAGNOSIS — J44.9 CHRONIC OBSTRUCTIVE PULMONARY DISEASE, UNSPECIFIED COPD TYPE (HCC): ICD-10-CM

## 2020-03-26 DIAGNOSIS — J44.1 CHRONIC OBSTRUCTIVE PULMONARY DISEASE WITH ACUTE EXACERBATION (HCC): ICD-10-CM

## 2020-03-26 RX ORDER — ALBUTEROL SULFATE 90 UG/1
2 AEROSOL, METERED RESPIRATORY (INHALATION) EVERY 6 HOURS PRN
Qty: 18 G | Refills: 0 | Status: SHIPPED | OUTPATIENT
Start: 2020-03-26 | End: 2020-06-02

## 2020-05-04 DIAGNOSIS — K58.0 IRRITABLE BOWEL SYNDROME WITH DIARRHEA: ICD-10-CM

## 2020-05-04 RX ORDER — LOPERAMIDE HYDROCHLORIDE 2 MG/1
CAPSULE ORAL
Qty: 180 CAP | Refills: 0 | Status: SHIPPED | OUTPATIENT
Start: 2020-05-04 | End: 2023-05-08

## 2020-05-23 DIAGNOSIS — E78.2 MIXED HYPERLIPIDEMIA: ICD-10-CM

## 2020-05-23 DIAGNOSIS — I73.9 PVD (PERIPHERAL VASCULAR DISEASE) (HCC): ICD-10-CM

## 2020-05-26 RX ORDER — SIMVASTATIN 10 MG
TABLET ORAL
Qty: 100 TAB | Refills: 0 | Status: SHIPPED | OUTPATIENT
Start: 2020-05-26 | End: 2020-08-31

## 2020-06-01 DIAGNOSIS — J44.1 CHRONIC OBSTRUCTIVE PULMONARY DISEASE WITH ACUTE EXACERBATION (HCC): ICD-10-CM

## 2020-06-02 DIAGNOSIS — J44.1 CHRONIC OBSTRUCTIVE PULMONARY DISEASE WITH ACUTE EXACERBATION (HCC): ICD-10-CM

## 2020-06-02 RX ORDER — ALBUTEROL SULFATE 90 UG/1
2 AEROSOL, METERED RESPIRATORY (INHALATION) EVERY 6 HOURS PRN
Qty: 18 G | Refills: 5 | Status: SHIPPED | OUTPATIENT
Start: 2020-06-02 | End: 2020-06-02 | Stop reason: SDUPTHER

## 2020-06-02 RX ORDER — ALBUTEROL SULFATE 90 UG/1
2 AEROSOL, METERED RESPIRATORY (INHALATION) EVERY 6 HOURS PRN
Qty: 18 G | Refills: 5 | Status: SHIPPED | OUTPATIENT
Start: 2020-06-02 | End: 2021-07-08

## 2020-06-05 DIAGNOSIS — J44.9 CHRONIC OBSTRUCTIVE PULMONARY DISEASE, UNSPECIFIED COPD TYPE (HCC): ICD-10-CM

## 2020-06-08 DIAGNOSIS — J44.9 CHRONIC OBSTRUCTIVE PULMONARY DISEASE, UNSPECIFIED COPD TYPE (HCC): ICD-10-CM

## 2020-06-16 ENCOUNTER — OFFICE VISIT (OUTPATIENT)
Dept: MEDICAL GROUP | Facility: MEDICAL CENTER | Age: 75
End: 2020-06-16
Payer: MEDICARE

## 2020-06-16 VITALS
WEIGHT: 214.07 LBS | BODY MASS INDEX: 32.44 KG/M2 | RESPIRATION RATE: 16 BRPM | DIASTOLIC BLOOD PRESSURE: 68 MMHG | TEMPERATURE: 96.8 F | OXYGEN SATURATION: 94 % | HEIGHT: 68 IN | HEART RATE: 87 BPM | SYSTOLIC BLOOD PRESSURE: 142 MMHG

## 2020-06-16 DIAGNOSIS — F51.04 CHRONIC INSOMNIA: ICD-10-CM

## 2020-06-16 DIAGNOSIS — F17.200 TOBACCO DEPENDENCE: ICD-10-CM

## 2020-06-16 DIAGNOSIS — I73.9 PVD (PERIPHERAL VASCULAR DISEASE) (HCC): ICD-10-CM

## 2020-06-16 DIAGNOSIS — J30.89 ENVIRONMENTAL AND SEASONAL ALLERGIES: ICD-10-CM

## 2020-06-16 DIAGNOSIS — J44.9 CHRONIC OBSTRUCTIVE PULMONARY DISEASE, UNSPECIFIED COPD TYPE (HCC): ICD-10-CM

## 2020-06-16 PROCEDURE — 99214 OFFICE O/P EST MOD 30 MIN: CPT | Performed by: FAMILY MEDICINE

## 2020-06-16 RX ORDER — METHYLPREDNISOLONE 4 MG/1
TABLET ORAL
Qty: 21 TAB | Refills: 0 | Status: SHIPPED | OUTPATIENT
Start: 2020-06-16 | End: 2020-07-23

## 2020-06-16 RX ORDER — TRAZODONE HYDROCHLORIDE 50 MG/1
50 TABLET ORAL
Qty: 30 TAB | Refills: 3 | Status: SHIPPED | OUTPATIENT
Start: 2020-06-16 | End: 2021-03-01

## 2020-06-16 ASSESSMENT — PATIENT HEALTH QUESTIONNAIRE - PHQ9: CLINICAL INTERPRETATION OF PHQ2 SCORE: 0

## 2020-06-16 NOTE — PROGRESS NOTES
Annual Health Assessment Questions:    1.  Are you currently engaging in any exercise or physical activity? Yes    2.  How would you describe your mood or emotional well-being today? good    3.  Have you had any falls in the last year? No    4.  Have you noticed any problems with your balance or had difficulty walking?  Yes     5.  In the last six months have you experienced any leakage of urine? No    6. DPA/Advanced Directive: Patient does not have an Advanced Directive.  A packet and workshop information was given on Advanced Directives.     CC: Seasonal allergies, COPD, tobacco dependence, peripheral vascular disease, chronic insomnia    HPI:   Josh presents today discuss the following:    Environmental and seasonal allergies  Patient has been having itchy runny nose and eyes.  Always symptomatic during the fall and spring.  Has been using over-the-counter histamine has not been helping.  Denies any headache, or pressure-like symptoms, has been having clear watery rhinorrhea.     Chronic obstructive pulmonary disease, unspecified COPD type ([facility])/tobacco dependence  Denies cough or shortness of breath.  His oxygen saturation has been normal.  Has been doing fine on Advair twice a day and albuterol as needed. Patient has been smoking half a pack a day, he stated that today is his first day of quitting.  He and his wife decided to quit smoking today.  I complemented the patient on this decision.     PVD (peripheral vascular disease) ([facility])  Patient is currently asymptomatic.  Denies any claudication.  However his last arterial ultrasound which showed:   1) Severe (>75%) right distal superficial femoral arterial stenosis.   2) Moderate (50-75%) left mid superficial femoral arterial stenosis.  Patient was seen before by his vascular surgeon who recommended conservative treatment(aspirin, statin, and exercise).  Patient has been smoking half a pack a day, he stated that today is his first day of quitting.  He and his  wife decided to quit smoking today.     Chronic insomnia  Patient has been having trouble falling and staying asleep.  He has tried over-the-counter sleeping aid has been helping for some time but lately it has not been helping.       Patient Active Problem List    Diagnosis Date Noted   • Obesity (BMI 30-39.9) 09/17/2018   • Irritable bowel syndrome with diarrhea 05/31/2018   • BPH (benign prostatic hyperplasia) 05/02/2012   • COPD (chronic obstructive pulmonary disease) ([facility]) 05/02/2012   • Numbness and tingling of both legs 05/23/2011   • Lumbosacral radiculopathy 05/23/2011   • Neuropathic pain 05/23/2011   • Hyperlipidemia 12/21/2010   • Migraine 09/22/2010   • PVD (peripheral vascular disease) ([facility]) 09/22/2010   • Tobacco abuse 08/18/2009   • HCV (hepatitis C virus) 08/18/2009   • IVDU (intravenous drug user) 08/18/2009   • GERD (gastroesophageal reflux disease) 08/18/2009       Current Outpatient Medications   Medication Sig Dispense Refill   • methylPREDNISolone (MEDROL DOSEPAK) 4 MG Tablet Therapy Pack As directed on the packaging label. 21 Tab 0   • traZODone (DESYREL) 50 MG Tab Take 1 Tab by mouth every bedtime. 30 Tab 3   • fluticasone-salmeterol (ADVAIR) 100-50 MCG/DOSE AEROSOL POWDER, BREATH ACTIVATED Inhale 1 Puff by mouth every 12 hours. 1 Inhaler 5   • albuterol 108 (90 Base) MCG/ACT Aero Soln inhalation aerosol Inhale 2 Puffs by mouth every 6 hours as needed for Shortness of Breath. 18 g 5   • simvastatin (ZOCOR) 10 MG Tab TAKE 1 TABLET BY MOUTH ONCE DAILY IN THE EVENING 100 Tab 0   • loperamide (IMODIUM) 2 MG Cap Take 1 capsule by mouth twice daily 180 Cap 0   • doxazosin (CARDURA) 2 MG Tab TAKE 1 TABLET BY MOUTH TWICE DAILY 180 Tab 3   • MethylPREDNISolone (MEDROL DOSEPAK) 4 MG Tablet Therapy Pack As directed on the packaging label. (Patient not taking: Reported on 4/16/2019) 21 Tab 0   • aspirin EC (ECOTRIN) 81 MG Tablet Delayed Response Take 81 mg by mouth every day.     • ranitidine (ZANTAC)  150 MG Tab Take 1 Tab by mouth 2 times a day. 180 Tab 1     No current facility-administered medications for this visit.          Allergies as of 06/16/2020 - Reviewed 06/16/2020   Allergen Reaction Noted   • Nkda [no known drug allergy]  10/25/2007        ROS: Denies any chest pain, Shortness of breath, Changes bowel or bladder, Lower extremity edema.    Physical Exam:  Gen.: Well-developed, well-nourished, no apparent distress,pleasant and cooperative with the examination  Skin:  Warm and dry with good turgor. No rashes or suspicious lesions in visible areas  Eye: PERRLA, conjunctiva and sclera clear, lids normal  HEENT: Normocephalic/atraumatic, sinuses nontender with palpation, TMs clear, nares patent with pink mucosa and clear rhinorrhea, lips without lesions, oropharynx clear.  Neck: Trachea midline,no masses or adenopathy  Thyroid: normal consistency and size. No masses or nodules. Not tender with palpation.  Cor: Regular rate and rhythm without murmur, gallop or rub.  Lungs: Respirations unlabored.Clear to auscultation with equal breath sounds bilaterally. No wheezes, rhonchi.  Abdomen: Soft nontender without hepatosplenomegaly or masses appreciated, normoactive bowel sounds. No hernias.  Extremities: No cyanosis, clubbing or edema, Symmetrical without deformities or malformations. Pulses 2+ and symmetrical both upper and lower extremities  Lymphatic: No abnormal adenopathy of the neck groin or axillae.  Psych: Alert and oriented x 3.Normal affect, judgement,insight and memory.        Assessment and Plan.   74 y.o. male     1. Environmental and seasonal allergies  Currently asymptomatic(Allergic rhinitis and itchy eyes ).  Patient advised to take Flonase and Claritin over-the-counter.  Will start patient on Medrol Dosepak.    - methylPREDNISolone (MEDROL DOSEPAK) 4 MG Tablet Therapy Pack; As directed on the packaging label.  Dispense: 21 Tab; Refill: 0    2. Chronic obstructive pulmonary disease, unspecified  COPD type (HCC)  Stable.  Continue on Advair twice a day and albuterol as needed.   Smoking cessation discussed, patient stated that today is his first day of quitting.    3. PVD (peripheral vascular disease) ([facility])  Currently asymptomatic.  Continue on conservative treatment as recommended by vascular surgeon(aspirin, statin, and exercise)    4. Chronic insomnia  Has tried over-the-counter sleeping aid did not help.  Will try trazodone 50 mg nightly.    - traZODone (DESYREL) 50 MG Tab; Take 1 Tab by mouth every bedtime.  Dispense: 30 Tab; Refill: 3    5. Tobacco dependence  Patient has been smoking half a pack a day, he stated that today is his first day of quitting.  He and his wife decided to quit smoking today.  I complemented the patient on this decision.

## 2020-07-23 DIAGNOSIS — J30.89 ENVIRONMENTAL AND SEASONAL ALLERGIES: ICD-10-CM

## 2020-07-23 RX ORDER — METHYLPREDNISOLONE 4 MG/1
TABLET ORAL
Qty: 21 EACH | Refills: 0 | Status: SHIPPED | OUTPATIENT
Start: 2020-07-23 | End: 2021-03-01

## 2020-08-29 DIAGNOSIS — I73.9 PVD (PERIPHERAL VASCULAR DISEASE) (HCC): ICD-10-CM

## 2020-08-29 DIAGNOSIS — E78.2 MIXED HYPERLIPIDEMIA: ICD-10-CM

## 2020-08-31 RX ORDER — SIMVASTATIN 10 MG
10 TABLET ORAL EVERY EVENING
Qty: 100 TAB | Refills: 3 | Status: SHIPPED | OUTPATIENT
Start: 2020-08-31 | End: 2021-10-04

## 2020-10-28 DIAGNOSIS — N40.0 BPH WITHOUT URINARY OBSTRUCTION: ICD-10-CM

## 2020-10-28 RX ORDER — DOXAZOSIN 2 MG/1
TABLET ORAL
Qty: 200 TAB | Refills: 1 | Status: SHIPPED | OUTPATIENT
Start: 2020-10-28 | End: 2021-03-25 | Stop reason: SDUPTHER

## 2020-11-05 ENCOUNTER — OFFICE VISIT (OUTPATIENT)
Dept: MEDICAL GROUP | Facility: MEDICAL CENTER | Age: 75
End: 2020-11-05
Payer: MEDICARE

## 2020-11-05 VITALS
HEIGHT: 68 IN | OXYGEN SATURATION: 95 % | SYSTOLIC BLOOD PRESSURE: 142 MMHG | HEART RATE: 80 BPM | DIASTOLIC BLOOD PRESSURE: 50 MMHG | TEMPERATURE: 98.3 F | WEIGHT: 217 LBS | BODY MASS INDEX: 32.89 KG/M2 | RESPIRATION RATE: 16 BRPM

## 2020-11-05 DIAGNOSIS — J44.9 CHRONIC OBSTRUCTIVE PULMONARY DISEASE, UNSPECIFIED COPD TYPE (HCC): ICD-10-CM

## 2020-11-05 DIAGNOSIS — Z72.0 TOBACCO ABUSE: ICD-10-CM

## 2020-11-05 DIAGNOSIS — Z23 NEED FOR VACCINATION: ICD-10-CM

## 2020-11-05 DIAGNOSIS — N40.0 BENIGN PROSTATIC HYPERPLASIA WITHOUT LOWER URINARY TRACT SYMPTOMS: ICD-10-CM

## 2020-11-05 DIAGNOSIS — I73.9 PVD (PERIPHERAL VASCULAR DISEASE) (HCC): ICD-10-CM

## 2020-11-05 PROCEDURE — 99214 OFFICE O/P EST MOD 30 MIN: CPT | Mod: 25 | Performed by: FAMILY MEDICINE

## 2020-11-05 PROCEDURE — G0008 ADMIN INFLUENZA VIRUS VAC: HCPCS | Performed by: FAMILY MEDICINE

## 2020-11-05 PROCEDURE — 90662 IIV NO PRSV INCREASED AG IM: CPT | Performed by: FAMILY MEDICINE

## 2020-11-05 NOTE — PROGRESS NOTES
CC: Peripheral vascular disease, COPD, tobacco dependence, BPH    HPI:   Josh presents today to discuss the following medical issues:    PVD (peripheral vascular disease) ([facility])/tobacco use  He is currently asymptomatic.  Denies any claudication(leg pain with walking).  Previous arterial ultrasound showed:   1) Severe (>75%) right distal superficial femoral arterial stenosis.   2) Moderate (50-75%) left mid superficial femoral arterial stenosis.  He was seen before by his vascular surgeon who recommended conservative treatment(aspirin, statin, and exercise).  Patient has been smoking half a pack a day, recreated for few days and went back again.  Wants to try Chantix.    Chronic obstructive pulmonary disease, unspecified COPD type ([facility])/tobacco dependence  Denies cough or shortness of breath.  His oxygen saturation has been normal.  Has been doing fine on Advair twice a day and albuterol as needed. Patient has been smoking half a pack a day, recreated for few days and went back again.  Wants to try Chantix.    Benign prostatic hyperplasia without lower urinary tract symptoms  Urinary frequency, urgency, incomplete bladder emptying has improved since he started the doxazosin.  Has been on doxazosin 2 mg daily denies any side effects.      Flu shot is given today    Patient Active Problem List    Diagnosis Date Noted   • Obesity (BMI 30-39.9) 09/17/2018   • Irritable bowel syndrome with diarrhea 05/31/2018   • BPH (benign prostatic hyperplasia) 05/02/2012   • COPD (chronic obstructive pulmonary disease) ([facility]) 05/02/2012   • Numbness and tingling of both legs 05/23/2011   • Lumbosacral radiculopathy 05/23/2011   • Neuropathic pain 05/23/2011   • Hyperlipidemia 12/21/2010   • Migraine 09/22/2010   • PVD (peripheral vascular disease) ([facility]) 09/22/2010   • Tobacco abuse 08/18/2009   • HCV (hepatitis C virus) 08/18/2009   • IVDU (intravenous drug user) 08/18/2009   • GERD (gastroesophageal reflux disease) 08/18/2009  "      Current Outpatient Medications   Medication Sig Dispense Refill   • doxazosin (CARDURA) 2 MG Tab Take 1 tablet by mouth twice daily 200 Tab 1   • simvastatin (ZOCOR) 10 MG Tab Take 1 Tab by mouth every evening. 100 Tab 3   • methylPREDNISolone (MEDROL DOSEPAK) 4 MG Tablet Therapy Pack TAKE BY MOUTH AS DIRECTED ON INSIDE OF PACKAGE 21 Each 0   • traZODone (DESYREL) 50 MG Tab Take 1 Tab by mouth every bedtime. 30 Tab 3   • fluticasone-salmeterol (ADVAIR) 100-50 MCG/DOSE AEROSOL POWDER, BREATH ACTIVATED Inhale 1 Puff by mouth every 12 hours. 1 Inhaler 5   • albuterol 108 (90 Base) MCG/ACT Aero Soln inhalation aerosol Inhale 2 Puffs by mouth every 6 hours as needed for Shortness of Breath. 18 g 5   • loperamide (IMODIUM) 2 MG Cap Take 1 capsule by mouth twice daily 180 Cap 0   • MethylPREDNISolone (MEDROL DOSEPAK) 4 MG Tablet Therapy Pack As directed on the packaging label. (Patient not taking: Reported on 4/16/2019) 21 Tab 0   • aspirin EC (ECOTRIN) 81 MG Tablet Delayed Response Take 81 mg by mouth every day.     • ranitidine (ZANTAC) 150 MG Tab Take 1 Tab by mouth 2 times a day. 180 Tab 1     No current facility-administered medications for this visit.          Allergies as of 11/05/2020 - Reviewed 11/05/2020   Allergen Reaction Noted   • Nkda [no known drug allergy]  10/25/2007        ROS: Denies any chest pain, Shortness of breath, Changes bowel or bladder, Lower extremity edema.    Physical Exam:  /50 (BP Location: Right arm, Patient Position: Sitting, BP Cuff Size: Adult)   Pulse 80   Temp 36.8 °C (98.3 °F) (Temporal)   Resp 16   Ht 1.727 m (5' 8\")   Wt 98.4 kg (217 lb)   SpO2 95%   BMI 32.99 kg/m²   Gen.: Well-developed, well-nourished, no apparent distress,pleasant and cooperative with the examination  Skin:  Warm and dry with good turgor. No rashes or suspicious lesions in visible areas  HEENT:Sinuses nontender with palpation, TMs clear, nares patent with pink mucosa and clear rhinorrhea,no " septal deviation ,polyps or lesions. lips without lesions, oropharynx clear.  Neck: Trachea midline,no masses or adenopathy. No JVD.  Cor: Regular rate and rhythm without murmur, gallop or rub.  Lungs: Respirations unlabored.Clear to auscultation with equal breath sounds bilaterally. No wheezes, rhonchi.  Extremities: No cyanosis, clubbing or edema.  Decreased peripheral pulses bilaterally          Assessment and Plan.   75 y.o. male     1. PVD (peripheral vascular disease) ([facility])  Asymptomatic.  Continue on aspirin, statin, and exercise  Smoking cessation discussed, patient wants to try Chantix    2. Chronic obstructive pulmonary disease, unspecified COPD type ([facility])  Stable.  Continue on Advair twice daily and albuterol as needed    3. Tobacco abuse  He quit, but he went back again, now he smokes about half a pack a day, smoking cessation discussed with patient.  Discussed with patient that probably COPD and peripheral vascular disease more disabling for him, if he continues smoking.  Patient wants to try Chantix    - varenicline (CHANTIX SHADE) 0.5 MG X 11 & 1 MG X 42 tablet; 0.5 mg p.o. daily x3 days, then 0.5 mg p.o. twice daily x4 days, then 1 mg p.o. twice daily x11 week  Dispense: 53 Tab; Refill: 2    4. Benign prostatic hyperplasia without lower urinary tract symptoms  Has been doing fine on doxazosin  Continue doxazosin 2 mg daily.    5. Need for vaccination  Flu shot is given today    - INFLUENZA VACCINE, HIGH DOSE (65+ ONLY)

## 2021-01-11 DIAGNOSIS — Z23 NEED FOR VACCINATION: ICD-10-CM

## 2021-02-26 ENCOUNTER — OFFICE VISIT (OUTPATIENT)
Dept: URGENT CARE | Facility: CLINIC | Age: 76
End: 2021-02-26
Payer: MEDICARE

## 2021-02-26 ENCOUNTER — NURSE TRIAGE (OUTPATIENT)
Dept: HEALTH INFORMATION MANAGEMENT | Facility: OTHER | Age: 76
End: 2021-02-26

## 2021-02-26 VITALS
BODY MASS INDEX: 32.89 KG/M2 | WEIGHT: 217 LBS | TEMPERATURE: 97.1 F | HEIGHT: 68 IN | HEART RATE: 84 BPM | SYSTOLIC BLOOD PRESSURE: 120 MMHG | DIASTOLIC BLOOD PRESSURE: 84 MMHG | RESPIRATION RATE: 16 BRPM | OXYGEN SATURATION: 95 %

## 2021-02-26 DIAGNOSIS — J01.10 ACUTE NON-RECURRENT FRONTAL SINUSITIS: ICD-10-CM

## 2021-02-26 PROCEDURE — 99203 OFFICE O/P NEW LOW 30 MIN: CPT | Performed by: STUDENT IN AN ORGANIZED HEALTH CARE EDUCATION/TRAINING PROGRAM

## 2021-02-26 RX ORDER — AMOXICILLIN AND CLAVULANATE POTASSIUM 875; 125 MG/1; MG/1
1 TABLET, FILM COATED ORAL 2 TIMES DAILY
Qty: 10 TABLET | Refills: 0 | Status: SHIPPED | OUTPATIENT
Start: 2021-02-26 | End: 2021-03-03

## 2021-02-26 NOTE — TELEPHONE ENCOUNTER
"PT C/O OF COUGH X1-2 WEEKS WITH SINUS PRESSURE. STATES ALWAYS GETS SINUS INFECTIONS. D/T AGE AND LENGTH OF COUGH, ADVISED TO BE SEEN AT  TODAY. ALSO REQUESTED TO EST CARE W/ A NEW PROVIDER.    Reason for Disposition  • HIGH RISK patient (e.g., age > 64 years, diabetes, heart or lung disease, weak immune system)    Additional Information  • Negative: SEVERE difficulty breathing (e.g., struggling for each breath, speaks in single words)  • Negative: Difficult to awaken or acting confused (e.g., disoriented, slurred speech)  • Negative: Bluish (or gray) lips or face now  • Negative: Shock suspected (e.g., cold/pale/clammy skin, too weak to stand, low BP, rapid pulse)  • Negative: Sounds like a life-threatening emergency to the triager  • Negative: [1] COVID-19 suspected (e.g., cough, fever, shortness of breath) AND [2] public health department recommends testing  • Negative: [1] COVID-19 exposure AND [2] no symptoms  • Negative: COVID-19 and Breastfeeding, questions about  • Negative: SEVERE or constant chest pain (Exception: mild central chest pain, present only when coughing)  • Negative: MODERATE difficulty breathing (e.g., speaks in phrases, SOB even at rest, pulse 100-120)  • Negative: MILD difficulty breathing (e.g., minimal/no SOB at rest, SOB with walking, pulse <100)  • Negative: Chest pain  • Negative: Patient sounds very sick or weak to the triager  • Negative: [1] Fever > 100.0 F (37.8 C) AND [2] bedridden (e.g., nursing home patient, CVA, chronic illness, recovering from surgery)  • Negative: [1] Fever > 101 F (38.3 C) AND [2] age > 60  • Negative: Fever > 103 F (39.4 C)    Answer Assessment - Initial Assessment Questions  1. COVID-19 DIAGNOSIS: \"Who made your Coronavirus (COVID-19) diagnosis?\" \"Was it confirmed by a positive lab test?\" If not diagnosed by a HCP, ask \"Are there lots of cases (community spread) where you live?\" (See public health department website, if unsure)    * MAJOR community spread: " "high number of cases; numbers of cases are increasing; many people hospitalized.    * MINOR community spread: low number of cases; not increasing; few or no people hospitalized      Major, no contact  2. ONSET: \"When did the COVID-19 symptoms start?\"       1.5 weeks  3. WORST SYMPTOM: \"What is your worst symptom?\" (e.g., cough, fever, shortness of breath, muscle aches)      Congestion  4. COUGH: \"How bad is the cough?\"        mild  5. FEVER: \"Do you have a fever?\" If so, ask: \"What is your temperature, how was it measured, and when did it start?\"      Unk  6. RESPIRATORY STATUS: \"Describe your breathing?\" (e.g., shortness of breath, wheezing, unable to speak)       Sl SOB  7. BETTER-SAME-WORSE: \"Are you getting better, staying the same or getting worse compared to yesterday?\"  If getting worse, ask, \"In what way?\"      worse  8. HIGH RISK DISEASE: \"Do you have any chronic medical problems?\" (e.g., asthma, heart or lung disease, weak immune system, etc.)      No  9. PREGNANCY: \"Is there any chance you are pregnant?\" \"When was your last menstrual period?\"      no  10. OTHER SYMPTOMS: \"Do you have any other symptoms?\"  (e.g., runny nose, headache, sore throat, loss of smell)        HA, congestion,    Protocols used: CORONAVIRUS (COVID-19) DIAGNOSED OR [id number]      "

## 2021-02-26 NOTE — PROGRESS NOTES
Subjective:   CHIEF COMPLAINT  Chief Complaint   Patient presents with   • Sinus Problem     x1 week, sinus pressure, headache, watery eyes        HPI  Josh Pang is a 75 y.o. male who presents with a chief complaint of sinus congestion, watery eyes and sneezing for approximately 10 days.  He says his symptoms are worse with the wind and most notable first thing in the morning.  He has tried using Flonase and respiratory inhalers which have not helped.  No additional modifying factors.  He admits associated symptoms of shortness of breath but this is chronic for the patient.  He smokes approximately 1/2 pack/day.  Denies any cough or wheezing.    REVIEW OF SYSTEMS  General: no fever or chills  GI: no nausea or vomiting  See HPI for further details.    PAST MEDICAL HISTORY  Patient Active Problem List    Diagnosis Date Noted   • Obesity (BMI 30-39.9) 09/17/2018   • Irritable bowel syndrome with diarrhea 05/31/2018   • BPH (benign prostatic hyperplasia) 05/02/2012   • COPD (chronic obstructive pulmonary disease) ([facility]) 05/02/2012   • Numbness and tingling of both legs 05/23/2011   • Lumbosacral radiculopathy 05/23/2011   • Neuropathic pain 05/23/2011   • Hyperlipidemia 12/21/2010   • Migraine 09/22/2010   • PVD (peripheral vascular disease) ([facility]) 09/22/2010   • Tobacco abuse 08/18/2009   • HCV (hepatitis C virus) 08/18/2009   • IVDU (intravenous drug user) 08/18/2009   • GERD (gastroesophageal reflux disease) 08/18/2009       SURGICAL HISTORY   has a past surgical history that includes open reduction; cholecystectomy; other; and other.    ALLERGIES  Allergies   Allergen Reactions   • Azithromycin      rash       CURRENT MEDICATIONS  Home Medications    **Home medications have not yet been reviewed for this encounter**         SOCIAL HISTORY  Social History     Tobacco Use   • Smoking status: Current Every Day Smoker     Packs/day: 0.00   • Smokeless tobacco: Never Used   Substance and Sexual Activity   • Alcohol use:  "No     Comment: drinks ETOH occasionally   • Drug use: No   • Sexual activity: Not on file       FAMILY HISTORY  Family History   Problem Relation Age of Onset   • Diabetes Mother    • Diabetes Father           Objective:   PHYSICAL EXAM  VITAL SIGNS: /84 (Patient Position: Sitting)   Pulse 84   Temp 36.2 °C (97.1 °F) (Temporal)   Resp 16   Ht 1.727 m (5' 8\")   Wt 98.4 kg (217 lb)   SpO2 95%   BMI 32.99 kg/m²     Gen: no acute distress, normal voice  Skin: dry, intact, moist mucosal membranes  ENT: No maxillary or frontal sinus TTP  Lungs: Left lower lobe rhonchi w/ symmetric expansion.  No wheezing or crackles  CV: RRR w/o murmurs or clicks  Psych: normal affect, normal judgement, alert, awake    Assessment/Plan:     1. Acute non-recurrent frontal sinusitis     Patient reports he has an allergy to azithromycin but has tolerated penicillins in the past.  -Instructed to perform Neilmed sinus rinses nightly followed by Flonase  -Rx sent for Augmentin.  Instructed to start if symptoms have not improved after 2 days following sinus rinses  -Hydration    Differential diagnosis, natural history, supportive care, and indications for immediate follow-up discussed. All questions answered. Patient agrees with the plan of care.    Follow-up as needed if symptoms worsen or fail to improve to PCP, Urgent care or Emergency Room.    Please note that this dictation was created using voice recognition software. I have made a reasonable attempt to correct obvious errors, but I expect that there are errors of grammar and possibly content that I did not discover before finalizing the note.         "

## 2021-03-01 ENCOUNTER — OFFICE VISIT (OUTPATIENT)
Dept: MEDICAL GROUP | Facility: MEDICAL CENTER | Age: 76
End: 2021-03-01
Payer: MEDICARE

## 2021-03-01 VITALS
BODY MASS INDEX: 32.43 KG/M2 | OXYGEN SATURATION: 96 % | WEIGHT: 214 LBS | HEART RATE: 74 BPM | DIASTOLIC BLOOD PRESSURE: 70 MMHG | RESPIRATION RATE: 16 BRPM | SYSTOLIC BLOOD PRESSURE: 124 MMHG | HEIGHT: 68 IN | TEMPERATURE: 97.6 F

## 2021-03-01 DIAGNOSIS — K58.0 IRRITABLE BOWEL SYNDROME WITH DIARRHEA: ICD-10-CM

## 2021-03-01 DIAGNOSIS — E66.9 OBESITY (BMI 30-39.9): ICD-10-CM

## 2021-03-01 DIAGNOSIS — K21.9 GASTROESOPHAGEAL REFLUX DISEASE WITHOUT ESOPHAGITIS: ICD-10-CM

## 2021-03-01 DIAGNOSIS — Z72.0 TOBACCO ABUSE: ICD-10-CM

## 2021-03-01 DIAGNOSIS — J44.9 CHRONIC OBSTRUCTIVE PULMONARY DISEASE, UNSPECIFIED COPD TYPE (HCC): ICD-10-CM

## 2021-03-01 DIAGNOSIS — N40.0 BENIGN PROSTATIC HYPERPLASIA WITHOUT LOWER URINARY TRACT SYMPTOMS: ICD-10-CM

## 2021-03-01 DIAGNOSIS — E78.2 MIXED HYPERLIPIDEMIA: ICD-10-CM

## 2021-03-01 DIAGNOSIS — I73.9 PVD (PERIPHERAL VASCULAR DISEASE) (HCC): ICD-10-CM

## 2021-03-01 PROCEDURE — 99214 OFFICE O/P EST MOD 30 MIN: CPT | Performed by: FAMILY MEDICINE

## 2021-03-01 PROCEDURE — 8041 PR SCP AHA: Performed by: FAMILY MEDICINE

## 2021-03-01 ASSESSMENT — PATIENT HEALTH QUESTIONNAIRE - PHQ9: CLINICAL INTERPRETATION OF PHQ2 SCORE: 0

## 2021-03-01 NOTE — PROGRESS NOTES
cc:  COPD    Subjective:     Josh Pang is a 75 y.o. male presenting to establish care:      1.  COPD: Has a history of COPD for years, has been stable.  Is using Advair twice a day.  He is also using his albuterol inhaler twice a day, for no particular reason.  He continues to smoke 1/2 pack/day, is trying to cut down.  Denies any coughing, shortness of breath, fevers.    2.  GERD: Stable on ranitidine    3.  Hyperlipidemia: Stable on simvastatin.  He also has peripheral vascular disease.  He saw vascular 6/2019.  Declines to go back, but is willing to do another scan.  He denies any leg pain with walking, but does have hip pain which he attributes to arthritis.    4.  IBS: Stable with intermittent Imodium    5.  BPH: Stable on Cardura.  He was having nocturia 3 times a night, now does not have any nocturia    Review of systems:  See above.       Current Outpatient Medications:   •  amoxicillin-clavulanate (AUGMENTIN) 875-125 MG Tab, Take 1 tablet by mouth 2 times a day for 5 days., Disp: 10 tablet, Rfl: 0  •  doxazosin (CARDURA) 2 MG Tab, Take 1 tablet by mouth twice daily, Disp: 200 Tab, Rfl: 1  •  simvastatin (ZOCOR) 10 MG Tab, Take 1 Tab by mouth every evening., Disp: 100 Tab, Rfl: 3  •  fluticasone-salmeterol (ADVAIR) 100-50 MCG/DOSE AEROSOL POWDER, BREATH ACTIVATED, Inhale 1 Puff by mouth every 12 hours., Disp: 1 Inhaler, Rfl: 5  •  albuterol 108 (90 Base) MCG/ACT Aero Soln inhalation aerosol, Inhale 2 Puffs by mouth every 6 hours as needed for Shortness of Breath., Disp: 18 g, Rfl: 5  •  loperamide (IMODIUM) 2 MG Cap, Take 1 capsule by mouth twice daily, Disp: 180 Cap, Rfl: 0  •  aspirin EC (ECOTRIN) 81 MG Tablet Delayed Response, Take 81 mg by mouth every day., Disp: , Rfl:   •  ranitidine (ZANTAC) 150 MG Tab, Take 1 Tab by mouth 2 times a day., Disp: 180 Tab, Rfl: 1    Allergies, past medical history, past surgical history, family history, social history reviewed and updated    Objective:     Vitals: BP  "124/70   Pulse 74   Temp 36.4 °C (97.6 °F)   Resp 16   Ht 1.727 m (5' 8\")   Wt 97.1 kg (214 lb)   SpO2 96%   BMI 32.54 kg/m²   General: Alert, pleasant, NAD  HEENT: Normocephalic.    Heart: Regular rate and rhythm.  S1 and S2 normal.  No murmurs appreciated.  Respiratory: Normal respiratory effort.  Clear to auscultation bilaterally.  Abdomen: Non-distended, soft  Skin: Warm, dry, no rashes.  Musculoskeletal: Gait is normal.  Moves all extremities well.  Extremities: No leg edema.  Psych:  Affect/mood is normal, judgement is good, memory is intact, grooming is appropriate.    Assessment/Plan:     Josh was seen today for establish care.    Diagnoses and all orders for this visit:    Chronic obstructive pulmonary disease, unspecified COPD type (HCC)  Chronic, stable, continue Advair.  Recommended using the albuterol only as needed.  -     PULMONARY FUNCTION TESTS -Test requested: Complete Pulmonary Function Test; Future    Tobacco abuse  Advised to quit, patient is working on this    Gastroesophageal reflux disease without esophagitis  Chronic, stable, continue ranitidine  -     CBC WITHOUT DIFFERENTIAL; Future  -     Comp Metabolic Panel; Future    Mixed hyperlipidemia  Chronic, stable, continue simvastatin.  Consider switching to rosuvastatin  -     Comp Metabolic Panel; Future  -     Lipid Profile; Future    PVD (peripheral vascular disease) (HCC)  Chronic, stable, continue to monitor.  He declines a referral back to vascular surgery.  -     US-EXTREMITY ARTERY LOWER BILAT W/PRASANTH (COMBO); Future  -     CBC WITHOUT DIFFERENTIAL; Future  -     Comp Metabolic Panel; Future    Irritable bowel syndrome with diarrhea  Chronic, stable, continue Imodium  -     CBC WITHOUT DIFFERENTIAL; Future  -     Comp Metabolic Panel; Future  -     TSH WITH REFLEX TO FT4; Future    Benign prostatic hyperplasia without lower urinary tract symptoms  Chronic, stable, continue Cardura  -     Comp Metabolic Panel; Future    Obesity " (BMI 30-39.9)  -     Patient identified as having weight management issue.  Appropriate orders and counseling given.          Return in about 6 months (around 9/1/2021) for routine follow up.      Annual Health Assessment Questions:     1.  Are you currently engaging in any exercise or physical activity? Yes     2.  How would you describe your mood or emotional well-being today? good     3.  Have you had any falls in the last year? No     4.  Have you noticed any problems with your balance or had difficulty walking? No     5.  In the last six months have you experienced any leakage of urine? No     6. DPA/Advanced Directive: Patient does not have an Advanced Directive.  A packet and workshop information was given on Advanced Directives.

## 2021-03-09 ENCOUNTER — APPOINTMENT (OUTPATIENT)
Dept: RADIOLOGY | Facility: MEDICAL CENTER | Age: 76
End: 2021-03-09
Attending: FAMILY MEDICINE
Payer: MEDICARE

## 2021-03-18 ENCOUNTER — APPOINTMENT (OUTPATIENT)
Dept: RADIOLOGY | Facility: MEDICAL CENTER | Age: 76
End: 2021-03-18
Attending: FAMILY MEDICINE
Payer: MEDICARE

## 2021-03-25 DIAGNOSIS — N40.0 BPH WITHOUT URINARY OBSTRUCTION: ICD-10-CM

## 2021-03-25 RX ORDER — DOXAZOSIN 2 MG/1
TABLET ORAL
Qty: 60 TABLET | Refills: 6 | Status: SHIPPED | OUTPATIENT
Start: 2021-03-25 | End: 2021-12-13

## 2021-03-30 ENCOUNTER — HOSPITAL ENCOUNTER (OUTPATIENT)
Dept: RADIOLOGY | Facility: MEDICAL CENTER | Age: 76
End: 2021-03-30
Attending: FAMILY MEDICINE
Payer: MEDICARE

## 2021-03-30 ENCOUNTER — HOSPITAL ENCOUNTER (OUTPATIENT)
Dept: LAB | Facility: MEDICAL CENTER | Age: 76
End: 2021-03-30
Attending: FAMILY MEDICINE
Payer: MEDICARE

## 2021-03-30 DIAGNOSIS — I73.9 PVD (PERIPHERAL VASCULAR DISEASE) (HCC): ICD-10-CM

## 2021-03-30 DIAGNOSIS — K58.0 IRRITABLE BOWEL SYNDROME WITH DIARRHEA: ICD-10-CM

## 2021-03-30 DIAGNOSIS — E78.2 MIXED HYPERLIPIDEMIA: ICD-10-CM

## 2021-03-30 DIAGNOSIS — K21.9 GASTROESOPHAGEAL REFLUX DISEASE WITHOUT ESOPHAGITIS: ICD-10-CM

## 2021-03-30 DIAGNOSIS — N40.0 BENIGN PROSTATIC HYPERPLASIA WITHOUT LOWER URINARY TRACT SYMPTOMS: ICD-10-CM

## 2021-03-30 LAB
ALBUMIN SERPL BCP-MCNC: 4.2 G/DL (ref 3.2–4.9)
ALBUMIN/GLOB SERPL: 1.3 G/DL
ALP SERPL-CCNC: 50 U/L (ref 30–99)
ALT SERPL-CCNC: 22 U/L (ref 2–50)
ANION GAP SERPL CALC-SCNC: 12 MMOL/L (ref 7–16)
AST SERPL-CCNC: 10 U/L (ref 12–45)
BILIRUB SERPL-MCNC: 0.6 MG/DL (ref 0.1–1.5)
BUN SERPL-MCNC: 20 MG/DL (ref 8–22)
CALCIUM SERPL-MCNC: 8.7 MG/DL (ref 8.5–10.5)
CHLORIDE SERPL-SCNC: 107 MMOL/L (ref 96–112)
CHOLEST SERPL-MCNC: 137 MG/DL (ref 100–199)
CO2 SERPL-SCNC: 22 MMOL/L (ref 20–33)
CREAT SERPL-MCNC: 0.89 MG/DL (ref 0.5–1.4)
ERYTHROCYTE [DISTWIDTH] IN BLOOD BY AUTOMATED COUNT: 47.4 FL (ref 35.9–50)
FASTING STATUS PATIENT QL REPORTED: NORMAL
GLOBULIN SER CALC-MCNC: 3.3 G/DL (ref 1.9–3.5)
GLUCOSE SERPL-MCNC: 104 MG/DL (ref 65–99)
HCT VFR BLD AUTO: 50.7 % (ref 42–52)
HDLC SERPL-MCNC: 41 MG/DL
HGB BLD-MCNC: 17.2 G/DL (ref 14–18)
LDLC SERPL CALC-MCNC: 68 MG/DL
MCH RBC QN AUTO: 32.3 PG (ref 27–33)
MCHC RBC AUTO-ENTMCNC: 33.9 G/DL (ref 33.7–35.3)
MCV RBC AUTO: 95.3 FL (ref 81.4–97.8)
PLATELET # BLD AUTO: 186 K/UL (ref 164–446)
PMV BLD AUTO: 11.5 FL (ref 9–12.9)
POTASSIUM SERPL-SCNC: 4.1 MMOL/L (ref 3.6–5.5)
PROT SERPL-MCNC: 7.5 G/DL (ref 6–8.2)
RBC # BLD AUTO: 5.32 M/UL (ref 4.7–6.1)
SODIUM SERPL-SCNC: 141 MMOL/L (ref 135–145)
TRIGL SERPL-MCNC: 138 MG/DL (ref 0–149)
WBC # BLD AUTO: 7.5 K/UL (ref 4.8–10.8)

## 2021-03-30 PROCEDURE — 84443 ASSAY THYROID STIM HORMONE: CPT

## 2021-03-30 PROCEDURE — 36415 COLL VENOUS BLD VENIPUNCTURE: CPT

## 2021-03-30 PROCEDURE — 80053 COMPREHEN METABOLIC PANEL: CPT

## 2021-03-30 PROCEDURE — 80061 LIPID PANEL: CPT

## 2021-03-30 PROCEDURE — 93925 LOWER EXTREMITY STUDY: CPT

## 2021-03-30 PROCEDURE — 93922 UPR/L XTREMITY ART 2 LEVELS: CPT

## 2021-03-30 PROCEDURE — 85027 COMPLETE CBC AUTOMATED: CPT

## 2021-03-31 LAB — TSH SERPL DL<=0.005 MIU/L-ACNC: 1.32 UIU/ML (ref 0.38–5.33)

## 2021-05-05 ENCOUNTER — TELEPHONE (OUTPATIENT)
Dept: MEDICAL GROUP | Facility: MEDICAL CENTER | Age: 76
End: 2021-05-05

## 2021-05-05 NOTE — TELEPHONE ENCOUNTER
ESTABLISHED PATIENT PRE-VISIT PLANNING     Patient was NOT contacted to complete PVP.     Note: Patient will not be contacted if there is no indication to call.     1.  Reviewed notes from the last few office visits within the medical group: Yes    2.  If any orders were placed at last visit or intended to be done for this visit (i.e. 6 mos follow-up), do we have Results/Consult Notes?         •  Labs - Labs ordered, completed on 3/30/21 and results are in chart.  Note: If patient appointment is for lab review and patient did not complete labs, check with provider if OK to reschedule patient until labs completed.       •  Imaging - Imaging was not ordered at last office visit.       •  Referrals - No referrals were ordered at last office visit.    3. Is this appointment scheduled as a Hospital Follow-Up? No    4.  Immunizations were updated in Epic using Reconcile Outside Information activity? Yes    5.  Patient is due for the following Health Maintenance Topics:   Health Maintenance Due   Topic Date Due   • Annual Wellness Visit  Never done   • Annual Pulmonary Function Test / Spirometry  02/05/2019         6.  AHA (Pulse8) form printed for Provider? Yes

## 2021-05-26 ENCOUNTER — NURSE TRIAGE (OUTPATIENT)
Dept: HEALTH INFORMATION MANAGEMENT | Facility: OTHER | Age: 76
End: 2021-05-26

## 2021-05-26 NOTE — TELEPHONE ENCOUNTER
Patient has been struggling with allergies over the last few months. Patient has congestion, runny nose and sinus headache. Advised on home care and scheduled PCP appointment for 6/8. Advised to go to  if s/s worsen before PCP appointment.     1. Caller Name: Josh Pang               Call Back Number:[phone]  [facility] PCP or Specialty Provider: Yes Lissa Fonseca        2. Has the patient previously tested positive for COVID-19? No    3.  In the last two weeks, has the patient had any new or worsening symptoms (not explained by alternative diagnosis)? No.    4.  Does patient have any comoribidities? COPD    5.  Has the patient had any known contact with someone who is suspected or confirmed to have COVID-19? No.    5. Disposition: Cleared by RN Triage as potential is low for COVID-19; OK to keep/schedule appointment    Note routed to [facility] Provider: FYI only.      ----- Message from Maude Crenshaw sent at 5/26/2021 10:58 AM PDT -----  HEADACHES, CONGESTION AND RUNNY NOSE GOING ON FOR A WHILE

## 2021-05-26 NOTE — TELEPHONE ENCOUNTER
"  Reason for Disposition  • Nasal allergies occur year-round    Additional Information  • Negative: Wheezing (high pitched whistling sound) and previous asthma attacks or use of asthma medicines  • Negative: Doesn't match the SYMPTOMS for nasal allergy  • Negative: Patient sounds very sick or weak to the triager  • Negative: Lots of coughing  • Negative: Lots of yellow or green discharge from nose, present > 3 days  • Negative: Nasal discharge present > 10 days  • Negative: MODERATE-SEVERE nasal allergy symptoms (i.e., interfere with sleep, school, or work) and taking antihistamines > 2 days  • Negative: Patient wants to be seen  • Negative: Nasal allergies occur only certain times of year and diagnosis of hay fever has never been confirmed by a physician    Answer Assessment - Initial Assessment Questions  1. SYMPTOM: \"What's the main symptom you're concerned about?\" (e.g., runny nose, stuffiness, sneezing, itching)      Runny nose congestion  2. SEVERITY: \"How bad is it?\" \"What does it keep you from doing?\" (e.g., sleeping, working)       Became a lot worse since last week with all the wind blowing  3. EYES: \"Are the eyes also red, watery, and itchy?\"       unsure  4. TRIGGER: \"What pollen or other allergic substance do you think is causing the symptoms?\"       Pollen and wind  5. TREATMENT: \"What medicine are you using?\" \"What medicine worked best in the past?\"      Flonase, Neilmed sinus rinse, Advair, abuterol  6. OTHER SYMPTOMS: \"Do you have any other symptoms?\" (e.g., coughing, difficulty breathing, wheezing)      headache  7. PREGNANCY: \"Is there any chance you are pregnant?\" \"When was your last menstrual period?\"      no    Protocols used: NASAL ALLERGIES (HAY FEVER)-A-[place]    "

## 2021-06-08 ENCOUNTER — OFFICE VISIT (OUTPATIENT)
Dept: MEDICAL GROUP | Facility: MEDICAL CENTER | Age: 76
End: 2021-06-08
Payer: MEDICARE

## 2021-06-08 VITALS
RESPIRATION RATE: 16 BRPM | HEIGHT: 68 IN | SYSTOLIC BLOOD PRESSURE: 124 MMHG | WEIGHT: 212 LBS | HEART RATE: 74 BPM | BODY MASS INDEX: 32.13 KG/M2 | TEMPERATURE: 97.6 F | DIASTOLIC BLOOD PRESSURE: 70 MMHG | OXYGEN SATURATION: 96 %

## 2021-06-08 DIAGNOSIS — J30.2 SEASONAL ALLERGIES: ICD-10-CM

## 2021-06-08 DIAGNOSIS — Z72.0 TOBACCO ABUSE: ICD-10-CM

## 2021-06-08 PROCEDURE — 99213 OFFICE O/P EST LOW 20 MIN: CPT | Performed by: FAMILY MEDICINE

## 2021-06-08 RX ORDER — PREDNISONE 20 MG/1
20 TABLET ORAL DAILY
Qty: 5 TABLET | Refills: 0 | Status: SHIPPED | OUTPATIENT
Start: 2021-06-08 | End: 2021-06-13

## 2021-06-08 RX ORDER — MONTELUKAST SODIUM 10 MG/1
10 TABLET ORAL DAILY
Qty: 90 TABLET | Refills: 1 | Status: SHIPPED | OUTPATIENT
Start: 2021-06-08 | End: 2021-11-28

## 2021-06-08 ASSESSMENT — FIBROSIS 4 INDEX: FIB4 SCORE: 0.86

## 2021-06-08 NOTE — PROGRESS NOTES
"  Subjective:     Josh Pang is a 75 y.o. male presenting to discuss allergies.  Has been having sinus congestion and drainage, itchy eyes, small mild shortness of breath for months.  This occurs year-round and has been progressing over the past several years.  Denies any ear pain, fevers, cough, chest pain.  He has been using nasal saline rinses, Flonase.  He reports that Allegra, Claritin, Zyrtec does not help.  He continues to smoke        Current Outpatient Medications:   •  montelukast (SINGULAIR) 10 MG Tab, Take 1 tablet by mouth every day., Disp: 90 tablet, Rfl: 1  •  predniSONE (DELTASONE) 20 MG Tab, Take 1 tablet by mouth every day for 5 days., Disp: 5 tablet, Rfl: 0  •  doxazosin (CARDURA) 2 MG Tab, Take 1 tablet by mouth twice daily, Disp: 60 tablet, Rfl: 6  •  simvastatin (ZOCOR) 10 MG Tab, Take 1 Tab by mouth every evening., Disp: 100 Tab, Rfl: 3  •  fluticasone-salmeterol (ADVAIR) 100-50 MCG/DOSE AEROSOL POWDER, BREATH ACTIVATED, Inhale 1 Puff by mouth every 12 hours., Disp: 1 Inhaler, Rfl: 5  •  albuterol 108 (90 Base) MCG/ACT Aero Soln inhalation aerosol, Inhale 2 Puffs by mouth every 6 hours as needed for Shortness of Breath., Disp: 18 g, Rfl: 5  •  loperamide (IMODIUM) 2 MG Cap, Take 1 capsule by mouth twice daily, Disp: 180 Cap, Rfl: 0  •  aspirin EC (ECOTRIN) 81 MG Tablet Delayed Response, Take 81 mg by mouth every day., Disp: , Rfl:   •  ranitidine (ZANTAC) 150 MG Tab, Take 1 Tab by mouth 2 times a day., Disp: 180 Tab, Rfl: 1    Objective:     Vitals: /70   Pulse 74   Temp 36.4 °C (97.6 °F)   Resp 16   Ht 1.727 m (5' 8\")   Wt 96.2 kg (212 lb)   SpO2 96%   BMI 32.23 kg/m²   General: Alert  HEENT: Normocephalic. Tympanic membranes pearly, translucent bilaterally.  Nares patent, mucosa pink.  Oropharynx non-erythematous, mucous membranes moist.   Neck supple.  No thyromegaly or masses palpated. No cervical or supraclavicular lymphadenopathy.  Heart: Regular rate and rhythm.  S1 and " S2 normal.  No murmurs appreciated.  Respiratory: Normal respiratory effort.  Clear to auscultation bilaterally.  Abdomen: Non-distended, soft  Extremities: No leg edema.    Assessment/Plan:     Josh was seen today for follow-up.    Diagnoses and all orders for this visit:    Seasonal allergies  Chronic, worsening.  Recommended continuing with the nasal saline rinses, Flonase.  We will add Singulair.  We will also do a short course of prednisone.  He reports getting an allergic reaction with hives and welts with methylprednisolone in the past.  He will let us know if he has any problems with the prednisone.  -     montelukast (SINGULAIR) 10 MG Tab; Take 1 tablet by mouth every day.  -     predniSONE (DELTASONE) 20 MG Tab; Take 1 tablet by mouth every day for 5 days.    Tobacco abuse  Advised to quit, patient is working on this.      Return in about 6 months (around 12/8/2021) for Med check.

## 2021-07-07 DIAGNOSIS — J44.1 CHRONIC OBSTRUCTIVE PULMONARY DISEASE WITH ACUTE EXACERBATION (HCC): ICD-10-CM

## 2021-07-07 DIAGNOSIS — J44.9 CHRONIC OBSTRUCTIVE PULMONARY DISEASE, UNSPECIFIED COPD TYPE (HCC): ICD-10-CM

## 2021-07-08 RX ORDER — ALBUTEROL SULFATE 90 UG/1
2 AEROSOL, METERED RESPIRATORY (INHALATION) EVERY 6 HOURS PRN
Qty: 18 G | Refills: 11 | Status: SHIPPED | OUTPATIENT
Start: 2021-07-08 | End: 2022-07-11

## 2021-08-16 ENCOUNTER — PATIENT OUTREACH (OUTPATIENT)
Dept: HEALTH INFORMATION MANAGEMENT | Facility: OTHER | Age: 76
End: 2021-08-16

## 2021-08-16 NOTE — NON-PROVIDER
Outcome: Left voice message regarding message Comprehensive health assessment    Please transfer to Patient Outreach Team at [phone] when patient returns call.    Attempt # 1

## 2021-09-15 NOTE — NON-PROVIDER
Outcome: Scheduled Comprehensive Health assessment    Mbr scheduled Comprehensive Health assessment. No to covid, advised to wear mask. Provided mbr with  information. No further needs at this time.     Attempt # 2

## 2021-09-16 PROBLEM — E66.811 OBESITY (BMI 30.0-34.9): Status: ACTIVE | Noted: 2018-09-17

## 2021-09-16 PROBLEM — M16.11 OSTEOARTHRITIS OF RIGHT HIP: Status: ACTIVE | Noted: 2021-09-16

## 2021-09-16 PROBLEM — R73.9 HYPERGLYCEMIA: Status: ACTIVE | Noted: 2021-09-16

## 2021-10-03 DIAGNOSIS — E78.2 MIXED HYPERLIPIDEMIA: ICD-10-CM

## 2021-10-03 DIAGNOSIS — I73.9 PVD (PERIPHERAL VASCULAR DISEASE) (HCC): ICD-10-CM

## 2021-10-04 RX ORDER — SIMVASTATIN 10 MG
10 TABLET ORAL EVERY EVENING
Qty: 100 TABLET | Refills: 1 | Status: SHIPPED | OUTPATIENT
Start: 2021-10-04 | End: 2022-04-15

## 2021-10-05 ENCOUNTER — OFFICE VISIT (OUTPATIENT)
Dept: MEDICAL GROUP | Facility: MEDICAL CENTER | Age: 76
End: 2021-10-05
Payer: MEDICARE

## 2021-10-05 VITALS
SYSTOLIC BLOOD PRESSURE: 128 MMHG | BODY MASS INDEX: 31.7 KG/M2 | HEART RATE: 64 BPM | DIASTOLIC BLOOD PRESSURE: 78 MMHG | RESPIRATION RATE: 16 BRPM | HEIGHT: 69 IN | OXYGEN SATURATION: 95 % | WEIGHT: 214 LBS | TEMPERATURE: 97.6 F

## 2021-10-05 DIAGNOSIS — Z23 NEED FOR VACCINATION: ICD-10-CM

## 2021-10-05 DIAGNOSIS — Z87.891 FORMER SMOKER: ICD-10-CM

## 2021-10-05 DIAGNOSIS — F43.9 STRESS AT HOME: ICD-10-CM

## 2021-10-05 DIAGNOSIS — I73.9 PERIPHERAL ARTERIAL DISEASE (HCC): ICD-10-CM

## 2021-10-05 PROCEDURE — G0008 ADMIN INFLUENZA VIRUS VAC: HCPCS | Performed by: FAMILY MEDICINE

## 2021-10-05 PROCEDURE — 99213 OFFICE O/P EST LOW 20 MIN: CPT | Mod: 25 | Performed by: FAMILY MEDICINE

## 2021-10-05 PROCEDURE — 90662 IIV NO PRSV INCREASED AG IM: CPT | Performed by: FAMILY MEDICINE

## 2021-10-05 ASSESSMENT — FIBROSIS 4 INDEX: FIB4 SCORE: 0.87

## 2021-10-05 NOTE — PROGRESS NOTES
"  Subjective:     Josh Pang is a 76 y.o. male presenting for a follow-up of his peripheral arterial disease.  Is now interested in seeing the vascular surgeon.  He has been exercising more regularly.  He also quit smoking.  He continues on the simvastatin 10 mg daily.  Denies any side effects.  His last lipid panel was in March, LDL was 68.  He also reports a fair amount of stress at home, is concerned about the homeless people living outside his home.  His wife is also retiring, needs assistance filling out Social Security forms.        Current Outpatient Medications:   •  QC NICOTINE TRANSDERMAL SYSTEM TD, Place  on the skin., Disp: , Rfl:   •  simvastatin (ZOCOR) 10 MG Tab, Take 1 Tablet by mouth every evening., Disp: 100 Tablet, Rfl: 1  •  fluticasone-salmeterol (ADVAIR) 100-50 MCG/DOSE AEROSOL POWDER, BREATH ACTIVATED, Inhale 1 Puff every 12 hours., Disp: 60 Each, Rfl: 11  •  albuterol 108 (90 Base) MCG/ACT Aero Soln inhalation aerosol, INHALE 2 PUFFS BY MOUTH EVERY 6 HOURS AS NEEDED FOR SHORTNESS OF BREATH, Disp: 18 g, Rfl: 11  •  montelukast (SINGULAIR) 10 MG Tab, Take 1 tablet by mouth every day., Disp: 90 tablet, Rfl: 1  •  doxazosin (CARDURA) 2 MG Tab, Take 1 tablet by mouth twice daily, Disp: 60 tablet, Rfl: 6  •  loperamide (IMODIUM) 2 MG Cap, Take 1 capsule by mouth twice daily, Disp: 180 Cap, Rfl: 0  •  aspirin EC (ECOTRIN) 81 MG Tablet Delayed Response, Take 81 mg by mouth every day., Disp: , Rfl:   •  ranitidine (ZANTAC) 150 MG Tab, Take 1 Tab by mouth 2 times a day., Disp: 180 Tab, Rfl: 1    Objective:     Vitals: /78   Pulse 64   Temp 36.4 °C (97.6 °F)   Resp 16   Ht 1.753 m (5' 9\")   Wt 97.1 kg (214 lb)   SpO2 95%   BMI 31.60 kg/m²   General: Alert  HEENT: Normocephalic.     Assessment/Plan:     Josh was seen today for follow-up.    Diagnoses and all orders for this visit:    Peripheral arterial disease (HCC)  Former smoker  Chronic, stable.  Referral placed.    -     REFERRAL TO " VASCULAR SURGERY    Need for vaccination  -     Influenza Vaccine, High Dose (65+ Only)    Stress at home  Referral to  place.  Offered a referral to therapy, he declined for now.  Follow-up in 3 to 4 months  -     REFERRAL TO CARE MANAGEMENT          Return in about 4 months (around 2/5/2022) for routine follow up.

## 2021-10-06 ENCOUNTER — PATIENT OUTREACH (OUTPATIENT)
Dept: HEALTH INFORMATION MANAGEMENT | Facility: OTHER | Age: 76
End: 2021-10-06

## 2021-10-06 NOTE — PROGRESS NOTES
CCM SW made phone contact with pt.  Pt provided information about CCM services, but states that he does not need assistance at this time.

## 2021-11-28 RX ORDER — MONTELUKAST SODIUM 10 MG/1
10 TABLET ORAL DAILY
Qty: 90 TABLET | Refills: 3 | Status: SHIPPED | OUTPATIENT
Start: 2021-11-28 | End: 2022-11-14

## 2021-12-12 DIAGNOSIS — N40.0 BPH WITHOUT URINARY OBSTRUCTION: ICD-10-CM

## 2021-12-13 RX ORDER — DOXAZOSIN 2 MG/1
2 TABLET ORAL 2 TIMES DAILY
Qty: 180 TABLET | Refills: 1 | Status: SHIPPED | OUTPATIENT
Start: 2021-12-13 | End: 2022-06-06

## 2022-02-10 ENCOUNTER — OFFICE VISIT (OUTPATIENT)
Dept: MEDICAL GROUP | Facility: MEDICAL CENTER | Age: 77
End: 2022-02-10
Payer: MEDICARE

## 2022-02-10 VITALS
TEMPERATURE: 97.6 F | SYSTOLIC BLOOD PRESSURE: 124 MMHG | OXYGEN SATURATION: 97 % | DIASTOLIC BLOOD PRESSURE: 76 MMHG | BODY MASS INDEX: 32.14 KG/M2 | HEIGHT: 69 IN | HEART RATE: 64 BPM | RESPIRATION RATE: 16 BRPM | WEIGHT: 217 LBS

## 2022-02-10 DIAGNOSIS — Z72.0 TOBACCO USE: ICD-10-CM

## 2022-02-10 DIAGNOSIS — E78.2 MIXED HYPERLIPIDEMIA: ICD-10-CM

## 2022-02-10 DIAGNOSIS — I73.9 PERIPHERAL ARTERIAL DISEASE (HCC): ICD-10-CM

## 2022-02-10 DIAGNOSIS — J44.9 CHRONIC OBSTRUCTIVE PULMONARY DISEASE, UNSPECIFIED COPD TYPE (HCC): ICD-10-CM

## 2022-02-10 DIAGNOSIS — R73.9 HYPERGLYCEMIA: ICD-10-CM

## 2022-02-10 PROBLEM — Z87.891 FORMER SMOKER: Status: RESOLVED | Noted: 2021-10-05 | Resolved: 2022-02-10

## 2022-02-10 PROCEDURE — 99214 OFFICE O/P EST MOD 30 MIN: CPT | Performed by: FAMILY MEDICINE

## 2022-02-10 ASSESSMENT — FIBROSIS 4 INDEX: FIB4 SCORE: 0.87

## 2022-02-10 ASSESSMENT — PATIENT HEALTH QUESTIONNAIRE - PHQ9: CLINICAL INTERPRETATION OF PHQ2 SCORE: 0

## 2022-02-10 NOTE — PROGRESS NOTES
"  Subjective:     Josh Pang is a 76 y.o. male presenting for a follow-up.  Overall feels well.  He continues on his medications regularly.  He still walks daily.  He has resumed smoking, is smoking about 7 cigarettes/day.  Is also using the patch to help him quit.  He reports that he saw vascular surgery, no intervention was needed for his peripheral vascular disease.  He has not had any claudication symptoms for several months.        Current Outpatient Medications:   •  aspirin EC (ECOTRIN) 81 MG Tablet Delayed Response, Take 4 Tablets by mouth every day., Disp: , Rfl:   •  doxazosin (CARDURA) 2 MG Tab, Take 1 Tablet by mouth 2 times a day., Disp: 180 Tablet, Rfl: 1  •  montelukast (SINGULAIR) 10 MG Tab, Take 1 Tablet by mouth every day., Disp: 90 Tablet, Rfl: 3  •  QC NICOTINE TRANSDERMAL SYSTEM TD, Place  on the skin., Disp: , Rfl:   •  simvastatin (ZOCOR) 10 MG Tab, Take 1 Tablet by mouth every evening., Disp: 100 Tablet, Rfl: 1  •  fluticasone-salmeterol (ADVAIR) 100-50 MCG/DOSE AEROSOL POWDER, BREATH ACTIVATED, Inhale 1 Puff every 12 hours., Disp: 60 Each, Rfl: 11  •  albuterol 108 (90 Base) MCG/ACT Aero Soln inhalation aerosol, INHALE 2 PUFFS BY MOUTH EVERY 6 HOURS AS NEEDED FOR SHORTNESS OF BREATH, Disp: 18 g, Rfl: 11  •  loperamide (IMODIUM) 2 MG Cap, Take 1 capsule by mouth twice daily, Disp: 180 Cap, Rfl: 0  •  ranitidine (ZANTAC) 150 MG Tab, Take 1 Tab by mouth 2 times a day., Disp: 180 Tab, Rfl: 1    Objective:     Vitals: /76   Pulse 64   Temp 36.4 °C (97.6 °F)   Resp 16   Ht 1.753 m (5' 9\")   Wt 98.4 kg (217 lb)   SpO2 97%   BMI 32.05 kg/m²   General: Alert  HEENT: Normocephalic.   Heart: Regular rate and rhythm.  S1 and S2 normal.  No murmurs appreciated.  Respiratory: Normal respiratory effort.  Clear to auscultation bilaterally.  Abdomen: Non-distended, soft  Extremities: No leg edema.    Assessment/Plan:     Josh was seen today for follow-up.    Diagnoses and all orders for this " visit:    Mixed hyperlipidemia  Chronic, stable, continue simvastatin  -     CBC WITHOUT DIFFERENTIAL; Future  -     Basic Metabolic Panel; Future  -     Lipid Profile; Future    Peripheral arterial disease (HCC)  Chronic, stable, continue to monitor  -     CBC WITHOUT DIFFERENTIAL; Future  -     Basic Metabolic Panel; Future    Hyperglycemia  Chronic, stable, continue to monitor  -     Basic Metabolic Panel; Future  -     HEMOGLOBIN A1C; Future    Chronic obstructive pulmonary disease, unspecified COPD type (HCC)  Chronic, stable, continue current inhalers  -     CBC WITHOUT DIFFERENTIAL; Future  -     Basic Metabolic Panel; Future    Tobacco use  Advised to quit, patient is working on        Return in about 6 months (around 8/10/2022) for routine follow up.      Annual Health Assessment Questions:     1.  Are you currently engaging in any exercise or physical activity? Yes     2.  How would you describe your mood or emotional well-being today? good     3.  Have you had any falls in the last year? No     4.  Have you noticed any problems with your balance or had difficulty walking? No     5.  In the last six months have you experienced any leakage of urine? No     6. DPA/Advanced Directive: Patient does not have an Advanced Directive.  A packet and workshop information was given on Advanced Directives.

## 2022-04-15 DIAGNOSIS — I73.9 PVD (PERIPHERAL VASCULAR DISEASE) (HCC): ICD-10-CM

## 2022-04-15 DIAGNOSIS — E78.2 MIXED HYPERLIPIDEMIA: ICD-10-CM

## 2022-04-15 RX ORDER — SIMVASTATIN 10 MG
10 TABLET ORAL EVERY EVENING
Qty: 100 TABLET | Refills: 3 | Status: SHIPPED | OUTPATIENT
Start: 2022-04-15 | End: 2023-06-04

## 2022-06-02 ENCOUNTER — HOSPITAL ENCOUNTER (OUTPATIENT)
Dept: LAB | Facility: MEDICAL CENTER | Age: 77
End: 2022-06-02
Attending: FAMILY MEDICINE
Payer: MEDICARE

## 2022-06-02 DIAGNOSIS — J44.9 CHRONIC OBSTRUCTIVE PULMONARY DISEASE, UNSPECIFIED COPD TYPE (HCC): ICD-10-CM

## 2022-06-02 DIAGNOSIS — I73.9 PERIPHERAL ARTERIAL DISEASE (HCC): ICD-10-CM

## 2022-06-02 DIAGNOSIS — R73.9 HYPERGLYCEMIA: ICD-10-CM

## 2022-06-02 DIAGNOSIS — E78.2 MIXED HYPERLIPIDEMIA: ICD-10-CM

## 2022-06-02 LAB
ANION GAP SERPL CALC-SCNC: 13 MMOL/L (ref 7–16)
BUN SERPL-MCNC: 17 MG/DL (ref 8–22)
CALCIUM SERPL-MCNC: 8.6 MG/DL (ref 8.5–10.5)
CHLORIDE SERPL-SCNC: 110 MMOL/L (ref 96–112)
CHOLEST SERPL-MCNC: 150 MG/DL (ref 100–199)
CO2 SERPL-SCNC: 18 MMOL/L (ref 20–33)
CREAT SERPL-MCNC: 1.05 MG/DL (ref 0.5–1.4)
ERYTHROCYTE [DISTWIDTH] IN BLOOD BY AUTOMATED COUNT: 48.3 FL (ref 35.9–50)
EST. AVERAGE GLUCOSE BLD GHB EST-MCNC: 114 MG/DL
FASTING STATUS PATIENT QL REPORTED: NORMAL
GFR SERPLBLD CREATININE-BSD FMLA CKD-EPI: 73 ML/MIN/1.73 M 2
GLUCOSE SERPL-MCNC: 105 MG/DL (ref 65–99)
HBA1C MFR BLD: 5.6 % (ref 4–5.6)
HCT VFR BLD AUTO: 50 % (ref 42–52)
HDLC SERPL-MCNC: 41 MG/DL
HGB BLD-MCNC: 17.3 G/DL (ref 14–18)
LDLC SERPL CALC-MCNC: 88 MG/DL
MCH RBC QN AUTO: 32.6 PG (ref 27–33)
MCHC RBC AUTO-ENTMCNC: 34.6 G/DL (ref 33.7–35.3)
MCV RBC AUTO: 94.2 FL (ref 81.4–97.8)
PLATELET # BLD AUTO: 157 K/UL (ref 164–446)
PMV BLD AUTO: 11.4 FL (ref 9–12.9)
POTASSIUM SERPL-SCNC: 3.9 MMOL/L (ref 3.6–5.5)
RBC # BLD AUTO: 5.31 M/UL (ref 4.7–6.1)
SODIUM SERPL-SCNC: 141 MMOL/L (ref 135–145)
TRIGL SERPL-MCNC: 103 MG/DL (ref 0–149)
WBC # BLD AUTO: 6.3 K/UL (ref 4.8–10.8)

## 2022-06-02 PROCEDURE — 36415 COLL VENOUS BLD VENIPUNCTURE: CPT

## 2022-06-02 PROCEDURE — 83036 HEMOGLOBIN GLYCOSYLATED A1C: CPT

## 2022-06-02 PROCEDURE — 80048 BASIC METABOLIC PNL TOTAL CA: CPT

## 2022-06-02 PROCEDURE — 85027 COMPLETE CBC AUTOMATED: CPT

## 2022-06-02 PROCEDURE — 80061 LIPID PANEL: CPT

## 2022-06-04 DIAGNOSIS — N40.0 BPH WITHOUT URINARY OBSTRUCTION: ICD-10-CM

## 2022-06-06 RX ORDER — DOXAZOSIN 2 MG/1
2 TABLET ORAL 2 TIMES DAILY
Qty: 180 TABLET | Refills: 3 | Status: SHIPPED | OUTPATIENT
Start: 2022-06-06 | End: 2023-06-01

## 2022-07-11 DIAGNOSIS — J44.1 CHRONIC OBSTRUCTIVE PULMONARY DISEASE WITH ACUTE EXACERBATION (HCC): ICD-10-CM

## 2022-07-11 DIAGNOSIS — J44.9 CHRONIC OBSTRUCTIVE PULMONARY DISEASE, UNSPECIFIED COPD TYPE (HCC): ICD-10-CM

## 2022-07-11 RX ORDER — FLUTICASONE PROPIONATE AND SALMETEROL 100; 50 UG/1; UG/1
POWDER RESPIRATORY (INHALATION)
Qty: 60 EACH | Refills: 11 | Status: SHIPPED | OUTPATIENT
Start: 2022-07-11 | End: 2023-07-19

## 2022-07-11 RX ORDER — ALBUTEROL SULFATE 90 UG/1
2 AEROSOL, METERED RESPIRATORY (INHALATION) EVERY 6 HOURS PRN
Qty: 18 G | Refills: 3 | Status: SHIPPED | OUTPATIENT
Start: 2022-07-11 | End: 2023-01-31

## 2022-08-10 ENCOUNTER — TELEPHONE (OUTPATIENT)
Dept: MEDICAL GROUP | Facility: MEDICAL CENTER | Age: 77
End: 2022-08-10
Payer: MEDICARE

## 2022-08-10 NOTE — TELEPHONE ENCOUNTER
ESTABLISHED PATIENT PRE-VISIT PLANNING     Patient was NOT contacted to complete PVP.     Note: Patient will not be contacted if there is no indication to call.     1.  Reviewed notes from the last few office visits within the medical group: Yes    2.  If any orders were placed at last visit or intended to be done for this visit (i.e. 6 mos follow-up), do we have Results/Consult Notes?           Labs - Labs ordered, completed on 06/02/2022 and results are in chart.  Note: If patient appointment is for lab review and patient did not complete labs, check with provider if OK to reschedule patient until labs completed.         Imaging - Imaging was not ordered at last office visit.         Referrals - No referrals were ordered at last office visit.    3. Is this appointment scheduled as a Hospital Follow-Up? No    4.  Immunizations were updated in Epic using Reconcile Outside Information activity? Yes    5.  Patient is due for the following Health Maintenance Topics:   Health Maintenance Due   Topic Date Due    IMM DTaP/Tdap/Td Vaccine (1 - Tdap) Never done    IMM ZOSTER VACCINES (1 of 2) Never done    Annual Pulmonary Function Test / Spirometry  02/05/2019    IMM PNEUMOCOCCAL VACCINE: 65+ Years (2 - PCV) 02/05/2019       6.  AHA (Pulse8) form printed for Provider? No, patient does not have any open alerts

## 2022-09-26 ENCOUNTER — OFFICE VISIT (OUTPATIENT)
Dept: MEDICAL GROUP | Facility: MEDICAL CENTER | Age: 77
End: 2022-09-26
Payer: MEDICARE

## 2022-09-26 VITALS
BODY MASS INDEX: 31.84 KG/M2 | WEIGHT: 215 LBS | HEIGHT: 69 IN | OXYGEN SATURATION: 96 % | SYSTOLIC BLOOD PRESSURE: 138 MMHG | RESPIRATION RATE: 16 BRPM | HEART RATE: 68 BPM | TEMPERATURE: 97.8 F | DIASTOLIC BLOOD PRESSURE: 78 MMHG

## 2022-09-26 DIAGNOSIS — J01.90 ACUTE NON-RECURRENT SINUSITIS, UNSPECIFIED LOCATION: ICD-10-CM

## 2022-09-26 DIAGNOSIS — M25.551 RIGHT HIP PAIN: ICD-10-CM

## 2022-09-26 DIAGNOSIS — E66.9 OBESITY (BMI 30.0-34.9): ICD-10-CM

## 2022-09-26 DIAGNOSIS — Z72.0 TOBACCO USE: ICD-10-CM

## 2022-09-26 PROCEDURE — 99213 OFFICE O/P EST LOW 20 MIN: CPT | Performed by: FAMILY MEDICINE

## 2022-09-26 RX ORDER — AMOXICILLIN AND CLAVULANATE POTASSIUM 875; 125 MG/1; MG/1
1 TABLET, FILM COATED ORAL 2 TIMES DAILY
Qty: 14 TABLET | Refills: 0 | Status: SHIPPED | OUTPATIENT
Start: 2022-09-26 | End: 2022-10-03

## 2022-09-26 ASSESSMENT — FIBROSIS 4 INDEX: FIB4 SCORE: 1.05

## 2022-09-26 NOTE — PROGRESS NOTES
"  Subjective:     Josh Pang is a 77 y.o. male presenting with sinus congestion.  Started several weeks ago.  Was initially triggered by the poor air quality, but symptoms have not resolved.  Associated with headaches.  Denies any fevers, teeth pain, ear pain, chest pain, shortness of breath, cough, abdominal pain, sick contacts, recent travel.  Has been trying Flonase, which does seem to help.  He continues to smoke about 1/3 pack/day.  Is also using the nicotine patches, he continues to work on quitting.        Current Outpatient Medications:     amoxicillin-clavulanate (AUGMENTIN) 875-125 MG Tab, Take 1 Tablet by mouth 2 times a day for 7 days., Disp: 14 Tablet, Rfl: 0    albuterol 108 (90 Base) MCG/ACT Aero Soln inhalation aerosol, INHALE 2 PUFFS BY MOUTH EVERY 6 HOURS AS NEEDED FOR SHORTNESS OF BREATH, Disp: 18 g, Rfl: 3    fluticasone-salmeterol (ADVAIR) 100-50 MCG/ACT AEROSOL POWDER, BREATH ACTIVATED, INHALE 1 DOSE BY MOUTH EVERY 12 HOURS, Disp: 60 Each, Rfl: 11    doxazosin (CARDURA) 2 MG Tab, Take 1 Tablet by mouth 2 times a day., Disp: 180 Tablet, Rfl: 3    simvastatin (ZOCOR) 10 MG Tab, Take 1 Tablet by mouth every evening., Disp: 100 Tablet, Rfl: 3    aspirin EC (ECOTRIN) 81 MG Tablet Delayed Response, Take 4 Tablets by mouth every day., Disp: , Rfl:     montelukast (SINGULAIR) 10 MG Tab, Take 1 Tablet by mouth every day., Disp: 90 Tablet, Rfl: 3    QC NICOTINE TRANSDERMAL SYSTEM TD, Place  on the skin., Disp: , Rfl:     loperamide (IMODIUM) 2 MG Cap, Take 1 capsule by mouth twice daily, Disp: 180 Cap, Rfl: 0    ranitidine (ZANTAC) 150 MG Tab, Take 1 Tab by mouth 2 times a day., Disp: 180 Tab, Rfl: 1    Objective:     Vitals: /78   Pulse 68   Temp 36.6 °C (97.8 °F)   Resp 16   Ht 1.74 m (5' 8.5\")   Wt 97.5 kg (215 lb)   SpO2 96%   BMI 32.22 kg/m²   General: Alert  HEENT: Normocephalic. Tympanic membranes pearly, translucent bilaterally. Neck supple.  No thyromegaly or masses palpated. No " cervical or supraclavicular lymphadenopathy.  Heart: Regular rate and rhythm.  S1 and S2 normal.  No murmurs appreciated.  Respiratory: Normal respiratory effort.  Clear to auscultation bilaterally.  Abdomen: Non-distended, soft  Extremities: No leg edema.    Assessment/Plan:     Josh was seen today for other.    Diagnoses and all orders for this visit:    Acute non-recurrent sinusitis, unspecified location  Acute, uncomplicated issue.  We will start a course of Augmentin.  Discussed continuing Flonase  -     amoxicillin-clavulanate (AUGMENTIN) 875-125 MG Tab; Take 1 Tablet by mouth 2 times a day for 7 days.    Tobacco use  Advised to quit, patient is working on this    Right hip pain  Chronic, worsening.  He reports he has arthritis and surgery was recommended for him in the past.  Referral to orthopedics placed  -     Referral to Orthopedics    Obesity (BMI 30.0-34.9)  -     Patient identified as having weight management issue.  Appropriate orders and counseling given.        Return in about 6 months (around 3/26/2023) for routine follow up.

## 2022-11-14 RX ORDER — MONTELUKAST SODIUM 10 MG/1
10 TABLET ORAL DAILY
Qty: 90 TABLET | Refills: 3 | Status: SHIPPED | OUTPATIENT
Start: 2022-11-14 | End: 2023-11-27 | Stop reason: SDUPTHER

## 2022-12-21 ENCOUNTER — OFFICE VISIT (OUTPATIENT)
Dept: MEDICAL GROUP | Facility: MEDICAL CENTER | Age: 77
End: 2022-12-21
Payer: MEDICARE

## 2022-12-21 VITALS
TEMPERATURE: 97.7 F | DIASTOLIC BLOOD PRESSURE: 58 MMHG | HEART RATE: 74 BPM | BODY MASS INDEX: 31.54 KG/M2 | HEIGHT: 69 IN | WEIGHT: 212.96 LBS | SYSTOLIC BLOOD PRESSURE: 126 MMHG | OXYGEN SATURATION: 96 %

## 2022-12-21 DIAGNOSIS — R05.1 ACUTE COUGH: ICD-10-CM

## 2022-12-21 DIAGNOSIS — J06.9 ACUTE URI: ICD-10-CM

## 2022-12-21 LAB
FLUAV+FLUBV AG SPEC QL IA: NEGATIVE
INT CON NEG: NEGATIVE
INT CON POS: POSITIVE

## 2022-12-21 PROCEDURE — 99213 OFFICE O/P EST LOW 20 MIN: CPT | Performed by: STUDENT IN AN ORGANIZED HEALTH CARE EDUCATION/TRAINING PROGRAM

## 2022-12-21 PROCEDURE — 87804 INFLUENZA ASSAY W/OPTIC: CPT | Performed by: STUDENT IN AN ORGANIZED HEALTH CARE EDUCATION/TRAINING PROGRAM

## 2022-12-21 RX ORDER — BENZONATATE 100 MG/1
100 CAPSULE ORAL 3 TIMES DAILY PRN
Qty: 60 CAPSULE | Refills: 0 | Status: SHIPPED | OUTPATIENT
Start: 2022-12-21 | End: 2023-05-08

## 2022-12-21 ASSESSMENT — FIBROSIS 4 INDEX: FIB4 SCORE: 1.05

## 2022-12-21 NOTE — PROGRESS NOTES
"Chief Complaint   Patient presents with    Chronic Cough     X 4 days, some fevers,body aches, fatigue,some headaches. Pt reports no sore throat         HPI: Patient is a 77 y.o. male complaining of 4 days of illness including cough, fevers, body aches, fatigue and headaches.  Patient denies sore throat.  Patient notes that he took a COVID test yesterday that was negative.  Patient notes that today his coughing is less and he feels slightly improved.  Cough is patient's worst symptom.  Signs are stable.  Does have history of COPD.  Patient does have history of smoking.  Patient notes chronic congestion, takes Flonase and montelukast daily.       ROS:  No fever, cough, nausea, changes in bowel movements or skin rash.        EXAM:  /58 (BP Location: Right arm, Patient Position: Sitting, BP Cuff Size: Adult)   Pulse 74   Temp 36.5 °C (97.7 °F) (Temporal)   Ht 1.753 m (5' 9\")   Wt 96.6 kg (212 lb 15.4 oz)   SpO2 96%   General: Alert, no conversational dyspnea or audible wheeze, non-toxic appearance.  Eyes: PERRL, conjunctiva slightly injected, no eye discharge.  Ears: Normal pinnae,TM's normal bilaterally.  Nares: Patent with thin mucus.  Sinuses: tender over maxillary / frontal sinuses.  Throat: Erythematous injection without exudate.   Neck: Supple, with moderately enlarged cervical nodes.  Lungs: Clear to auscultation bilaterally, no wheeze, crackles or rhonchi.   Heart: Regular rate without murmur.  Skin: Warm and dry without rash.     ASSESSMENT:   1. Acute cough          PLAN:  1. Educated patient that majority of upper respiratory infections are viral and do not need antibiotics.   2. Twice daily use of nasal saline rinse or Neti-Pot.  3. OTC anti-pyretics and decongestants as needed.  4. Follow-up in office or urgent care for worsening symptoms, difficulty breathing, lack of expected recovery, or should new symptoms or problems arise.    "

## 2022-12-22 RX ORDER — TRIAMCINOLONE ACETONIDE 40 MG/ML
INJECTION, SUSPENSION INTRA-ARTICULAR; INTRAMUSCULAR
COMMUNITY
Start: 2022-10-27 | End: 2023-05-08

## 2023-01-30 DIAGNOSIS — J44.1 CHRONIC OBSTRUCTIVE PULMONARY DISEASE WITH ACUTE EXACERBATION (HCC): ICD-10-CM

## 2023-01-31 ENCOUNTER — OFFICE VISIT (OUTPATIENT)
Dept: MEDICAL GROUP | Facility: MEDICAL CENTER | Age: 78
End: 2023-01-31
Payer: MEDICARE

## 2023-01-31 VITALS
HEIGHT: 69 IN | WEIGHT: 213 LBS | HEART RATE: 82 BPM | DIASTOLIC BLOOD PRESSURE: 70 MMHG | BODY MASS INDEX: 31.55 KG/M2 | SYSTOLIC BLOOD PRESSURE: 124 MMHG | RESPIRATION RATE: 16 BRPM | OXYGEN SATURATION: 97 %

## 2023-01-31 DIAGNOSIS — J32.9 CHRONIC CONGESTION OF PARANASAL SINUS: ICD-10-CM

## 2023-01-31 DIAGNOSIS — H61.23 BILATERAL IMPACTED CERUMEN: ICD-10-CM

## 2023-01-31 PROCEDURE — 99214 OFFICE O/P EST MOD 30 MIN: CPT | Performed by: STUDENT IN AN ORGANIZED HEALTH CARE EDUCATION/TRAINING PROGRAM

## 2023-01-31 RX ORDER — ALBUTEROL SULFATE 90 UG/1
2 AEROSOL, METERED RESPIRATORY (INHALATION) EVERY 6 HOURS PRN
Qty: 18 G | Refills: 0 | Status: SHIPPED | OUTPATIENT
Start: 2023-01-31 | End: 2023-03-09

## 2023-01-31 RX ORDER — AZELASTINE 1 MG/ML
1 SPRAY, METERED NASAL 2 TIMES DAILY
Qty: 30 ML | Refills: 0 | Status: SHIPPED | OUTPATIENT
Start: 2023-01-31 | End: 2023-05-01

## 2023-01-31 ASSESSMENT — FIBROSIS 4 INDEX: FIB4 SCORE: 1.05

## 2023-01-31 ASSESSMENT — PATIENT HEALTH QUESTIONNAIRE - PHQ9: CLINICAL INTERPRETATION OF PHQ2 SCORE: 0

## 2023-01-31 NOTE — PROGRESS NOTES
"Subjective:     Chief Complaint   Patient presents with    Referral Needed     ENT    Sinus Problem   '    HPI:   Josh presents today with     Chronic sinus congestion  Review of chart shows patient was seen in this office 9/26 for concern about sinus congestion.  Patient was treated at that time for acute nonrecurrent sinusitis with Augmentin and Flonase.  Patient notes that every morning he wakes up with eye discharge, watery runny eyes throughout the day, runny nose, worse in the morning.  She has been taking albuterol, Advair inhaler, Flonase daily, Singulair and over-the-counter Benadryl and NyQuil nasal spray.    Patient notes that his symptoms are worse during the fall/winter when his heater is blowing air.  Patient notes that he does wash his sheets weekly, has no animals in the house, and has air purifier but no relief when heater is on.  Patient symptoms are likely secondary to allergies but due to sinus pressure and recurrent sinusitis, will refer to ENT.  Discussed referral to allergist.     Cerumen impaction  Patient with bilateral cerumen impaction.  We will clean out today.    ROS:  Gen: no fevers/chills  Pulm: no sob, no cough  CV: no chest pain, no palpitations  GI: no nausea/vomiting, no diarrhea      Objective:     Exam:  /70 (BP Location: Left arm, Patient Position: Sitting, BP Cuff Size: Adult)   Pulse 82   Resp 16   Ht 1.753 m (5' 9\")   Wt 96.6 kg (213 lb)   SpO2 97%   BMI 31.45 kg/m²  Body mass index is 31.45 kg/m².    Gen: Alert and oriented, No apparent distress.  Neck: Neck is supple without lymphadenopathy.  Lungs: Normal effort, CTA bilaterally, no wheezes, rhonchi, or rales  CV: Regular rate and rhythm. No murmurs, rubs, or gallops.  Ext: No clubbing, cyanosis, edema.  Sinus: Tender to palpation of sinuses.  Ear: Normal impaction bilaterally    Assessment & Plan:     77 y.o. male with the following -     1. Chronic congestion of paranasal sinus  Chronic, stable.  Patient " would like further evaluation of his sinuses, concern for deviated septum.  Patient with chronic asthma and allergies.  Patient continues on inhalers, montelukast and over-the-counter antihistamines.  Trial Astelin spray for relief.  Patient referred to ENT.  - azelastine (ASTELIN) 137 MCG/SPRAY nasal spray; Administer 1 Spray into affected nostril(S) 2 times a day.  Dispense: 30 mL; Refill: 0  - Referral to ENT    2. Bilateral impacted cerumen  Procedure: Cerumen Removal  Risks and benefits of procedure discussed with patient.  Cerumen removed with  lavage   Patient tolerated the procedure well  Pt educated about proper care of ear canal. Q-tip cleaning discouraged, use of debrox and warm water lavage discussed.  Post lavage curette performed by provider, Post procedure exam with clear canal and normal TM.      No follow-ups on file.    Please note that this dictation was created using voice recognition software. I have made every reasonable attempt to correct obvious errors, but I expect that there are errors of grammar and possibly content that I did not discover before finalizing the note.

## 2023-03-08 DIAGNOSIS — J44.1 CHRONIC OBSTRUCTIVE PULMONARY DISEASE WITH ACUTE EXACERBATION (HCC): ICD-10-CM

## 2023-03-09 RX ORDER — ALBUTEROL SULFATE 90 UG/1
2 AEROSOL, METERED RESPIRATORY (INHALATION) EVERY 6 HOURS PRN
Qty: 18 G | Refills: 0 | Status: SHIPPED | OUTPATIENT
Start: 2023-03-09 | End: 2023-04-30

## 2023-04-28 DIAGNOSIS — J32.9 CHRONIC CONGESTION OF PARANASAL SINUS: ICD-10-CM

## 2023-04-30 DIAGNOSIS — J44.1 CHRONIC OBSTRUCTIVE PULMONARY DISEASE WITH ACUTE EXACERBATION (HCC): ICD-10-CM

## 2023-04-30 RX ORDER — ALBUTEROL SULFATE 90 UG/1
2 AEROSOL, METERED RESPIRATORY (INHALATION) EVERY 6 HOURS PRN
Qty: 18 G | Refills: 11 | Status: SHIPPED | OUTPATIENT
Start: 2023-04-30

## 2023-05-01 RX ORDER — AZELASTINE HYDROCHLORIDE 137 UG/1
SPRAY, METERED NASAL
Qty: 30 ML | Refills: 11 | Status: SHIPPED | OUTPATIENT
Start: 2023-05-01

## 2023-05-08 ENCOUNTER — OFFICE VISIT (OUTPATIENT)
Dept: MEDICAL GROUP | Facility: MEDICAL CENTER | Age: 78
End: 2023-05-08
Payer: MEDICARE

## 2023-05-08 VITALS
TEMPERATURE: 97.1 F | RESPIRATION RATE: 16 BRPM | DIASTOLIC BLOOD PRESSURE: 72 MMHG | WEIGHT: 213 LBS | HEART RATE: 79 BPM | OXYGEN SATURATION: 97 % | SYSTOLIC BLOOD PRESSURE: 122 MMHG | BODY MASS INDEX: 31.55 KG/M2 | HEIGHT: 69 IN

## 2023-05-08 DIAGNOSIS — K21.00 GASTROESOPHAGEAL REFLUX DISEASE WITH ESOPHAGITIS, UNSPECIFIED WHETHER HEMORRHAGE: ICD-10-CM

## 2023-05-08 DIAGNOSIS — Z23 NEED FOR VACCINATION: ICD-10-CM

## 2023-05-08 DIAGNOSIS — Z72.0 TOBACCO USE: ICD-10-CM

## 2023-05-08 DIAGNOSIS — I73.9 PERIPHERAL ARTERIAL DISEASE (HCC): ICD-10-CM

## 2023-05-08 DIAGNOSIS — J44.9 CHRONIC OBSTRUCTIVE PULMONARY DISEASE, UNSPECIFIED COPD TYPE (HCC): ICD-10-CM

## 2023-05-08 DIAGNOSIS — R73.9 HYPERGLYCEMIA: ICD-10-CM

## 2023-05-08 DIAGNOSIS — E78.2 MIXED HYPERLIPIDEMIA: ICD-10-CM

## 2023-05-08 PROCEDURE — 90677 PCV20 VACCINE IM: CPT | Performed by: FAMILY MEDICINE

## 2023-05-08 PROCEDURE — G0009 ADMIN PNEUMOCOCCAL VACCINE: HCPCS | Performed by: FAMILY MEDICINE

## 2023-05-08 PROCEDURE — 99214 OFFICE O/P EST MOD 30 MIN: CPT | Mod: 25 | Performed by: FAMILY MEDICINE

## 2023-05-08 NOTE — PROGRESS NOTES
"  Subjective:     Josh Pang is a 77 y.o. male presenting for a follow up          Current Outpatient Medications:     Azelastine HCl 137 MCG/SPRAY Solution, USE 1 SPRAY(S) INTO AFFECTED NOSTRIL(S)  TWICE DAILY, Disp: 30 mL, Rfl: 11    albuterol 108 (90 Base) MCG/ACT Aero Soln inhalation aerosol, INHALE 2 PUFFS BY MOUTH EVERY 6 HOURS AS NEEDED FOR SHORTNESS OF BREATH, Disp: 18 g, Rfl: 11    montelukast (SINGULAIR) 10 MG Tab, Take 1 Tablet by mouth every day., Disp: 90 Tablet, Rfl: 3    fluticasone-salmeterol (ADVAIR) 100-50 MCG/ACT AEROSOL POWDER, BREATH ACTIVATED, INHALE 1 DOSE BY MOUTH EVERY 12 HOURS, Disp: 60 Each, Rfl: 11    doxazosin (CARDURA) 2 MG Tab, Take 1 Tablet by mouth 2 times a day., Disp: 180 Tablet, Rfl: 3    simvastatin (ZOCOR) 10 MG Tab, Take 1 Tablet by mouth every evening., Disp: 100 Tablet, Rfl: 3    aspirin EC (ECOTRIN) 81 MG Tablet Delayed Response, Take 4 Tablets by mouth every day., Disp: , Rfl:     QC NICOTINE TRANSDERMAL SYSTEM TD, Place  on the skin., Disp: , Rfl:     ranitidine (ZANTAC) 150 MG Tab, Take 1 Tab by mouth 2 times a day., Disp: 180 Tab, Rfl: 1    loperamide (IMODIUM) 2 MG Cap, Take 1 capsule by mouth twice daily (Patient not taking: Reported on 3/15/2023), Disp: 180 Cap, Rfl: 0    Objective:     Vitals: /72   Pulse 79   Temp 36.2 °C (97.1 °F)   Resp 16   Ht 1.74 m (5' 8.5\")   Wt 96.6 kg (213 lb)   SpO2 97%   BMI 31.92 kg/m²   General: Alert  HEENT: Normocephalic  Heart: Regular rate and rhythm.  S1 and S2 normal.  Grade 2/6 systolic murmur  Respiratory: Normal respiratory effort.  Clear to auscultation bilaterally, diminished breath sounds throughout.  Abdomen: Non-distended, soft  Extremities: No leg edema.    Assessment/Plan:     Diagnoses and all orders for this visit:    Mixed hyperlipidemia  Chronic, stable, continue simvastatin.  -     Comp Metabolic Panel; Future  -     Lipid Profile; Future    Peripheral arterial disease (HCC)  Chronic, stable, continue " simvastatin.  He continues to exercise, walk  -     Comp Metabolic Panel; Future  -     Lipid Profile; Future    Tobacco use  Advised to quit, patient is working on this.  Is currently using the 14 mg nicotine patches.  He is still smoking about 1/2 pack/day.    Hyperglycemia  Chronic, stable, continue to monitor  -     Comp Metabolic Panel; Future  -     HEMOGLOBIN A1C; Future    Gastroesophageal reflux disease with esophagitis, unspecified whether hemorrhage  Chronic, stable, continue ranitidine  -     CBC WITHOUT DIFFERENTIAL; Future  -     Comp Metabolic Panel; Future    Chronic obstructive pulmonary disease, unspecified COPD type (HCC)  Chronic, stable, continue Advair    Need for vaccination  -     Pneumococcal Conjugate Vaccine 20-Valent (19 yrs+)          Return in about 6 months (around 11/8/2023) for routine follow up.

## 2023-05-31 DIAGNOSIS — N40.0 BPH WITHOUT URINARY OBSTRUCTION: ICD-10-CM

## 2023-06-01 RX ORDER — DOXAZOSIN 2 MG/1
2 TABLET ORAL 2 TIMES DAILY
Qty: 180 TABLET | Refills: 3 | Status: SHIPPED | OUTPATIENT
Start: 2023-06-01

## 2023-06-02 DIAGNOSIS — E78.2 MIXED HYPERLIPIDEMIA: ICD-10-CM

## 2023-06-02 DIAGNOSIS — I73.9 PVD (PERIPHERAL VASCULAR DISEASE) (HCC): ICD-10-CM

## 2023-06-04 RX ORDER — SIMVASTATIN 10 MG
10 TABLET ORAL EVERY EVENING
Qty: 100 TABLET | Refills: 1 | Status: SHIPPED | OUTPATIENT
Start: 2023-06-04 | End: 2023-12-18 | Stop reason: SDUPTHER

## 2023-07-13 ENCOUNTER — HOSPITAL ENCOUNTER (OUTPATIENT)
Dept: LAB | Facility: MEDICAL CENTER | Age: 78
End: 2023-07-13
Attending: FAMILY MEDICINE
Payer: MEDICARE

## 2023-07-13 DIAGNOSIS — I73.9 PERIPHERAL ARTERIAL DISEASE (HCC): ICD-10-CM

## 2023-07-13 DIAGNOSIS — E78.2 MIXED HYPERLIPIDEMIA: ICD-10-CM

## 2023-07-13 DIAGNOSIS — R73.9 HYPERGLYCEMIA: ICD-10-CM

## 2023-07-13 DIAGNOSIS — K21.00 GASTROESOPHAGEAL REFLUX DISEASE WITH ESOPHAGITIS, UNSPECIFIED WHETHER HEMORRHAGE: ICD-10-CM

## 2023-07-13 LAB
ALBUMIN SERPL BCP-MCNC: 3.9 G/DL (ref 3.2–4.9)
ALBUMIN/GLOB SERPL: 1.4 G/DL
ALP SERPL-CCNC: 45 U/L (ref 30–99)
ALT SERPL-CCNC: 20 U/L (ref 2–50)
ANION GAP SERPL CALC-SCNC: 14 MMOL/L (ref 7–16)
AST SERPL-CCNC: 20 U/L (ref 12–45)
BILIRUB SERPL-MCNC: 0.5 MG/DL (ref 0.1–1.5)
BUN SERPL-MCNC: 18 MG/DL (ref 8–22)
CALCIUM ALBUM COR SERPL-MCNC: 8.9 MG/DL (ref 8.5–10.5)
CALCIUM SERPL-MCNC: 8.8 MG/DL (ref 8.5–10.5)
CHLORIDE SERPL-SCNC: 109 MMOL/L (ref 96–112)
CHOLEST SERPL-MCNC: 127 MG/DL (ref 100–199)
CO2 SERPL-SCNC: 18 MMOL/L (ref 20–33)
CREAT SERPL-MCNC: 1.03 MG/DL (ref 0.5–1.4)
ERYTHROCYTE [DISTWIDTH] IN BLOOD BY AUTOMATED COUNT: 48.4 FL (ref 35.9–50)
EST. AVERAGE GLUCOSE BLD GHB EST-MCNC: 120 MG/DL
GFR SERPLBLD CREATININE-BSD FMLA CKD-EPI: 74 ML/MIN/1.73 M 2
GLOBULIN SER CALC-MCNC: 2.8 G/DL (ref 1.9–3.5)
GLUCOSE SERPL-MCNC: 98 MG/DL (ref 65–99)
HBA1C MFR BLD: 5.8 % (ref 4–5.6)
HCT VFR BLD AUTO: 52.3 % (ref 42–52)
HDLC SERPL-MCNC: 37 MG/DL
HGB BLD-MCNC: 17.5 G/DL (ref 14–18)
LDLC SERPL CALC-MCNC: 66 MG/DL
MCH RBC QN AUTO: 32.5 PG (ref 27–33)
MCHC RBC AUTO-ENTMCNC: 33.5 G/DL (ref 32.3–36.5)
MCV RBC AUTO: 97.2 FL (ref 81.4–97.8)
PLATELET # BLD AUTO: 199 K/UL (ref 164–446)
PMV BLD AUTO: 11.4 FL (ref 9–12.9)
POTASSIUM SERPL-SCNC: 3.9 MMOL/L (ref 3.6–5.5)
PROT SERPL-MCNC: 6.7 G/DL (ref 6–8.2)
RBC # BLD AUTO: 5.38 M/UL (ref 4.7–6.1)
SODIUM SERPL-SCNC: 141 MMOL/L (ref 135–145)
TRIGL SERPL-MCNC: 121 MG/DL (ref 0–149)
WBC # BLD AUTO: 8.3 K/UL (ref 4.8–10.8)

## 2023-07-13 PROCEDURE — 85027 COMPLETE CBC AUTOMATED: CPT

## 2023-07-13 PROCEDURE — 80061 LIPID PANEL: CPT

## 2023-07-13 PROCEDURE — 83036 HEMOGLOBIN GLYCOSYLATED A1C: CPT

## 2023-07-13 PROCEDURE — 36415 COLL VENOUS BLD VENIPUNCTURE: CPT

## 2023-07-13 PROCEDURE — 80053 COMPREHEN METABOLIC PANEL: CPT

## 2023-07-18 DIAGNOSIS — J44.9 CHRONIC OBSTRUCTIVE PULMONARY DISEASE, UNSPECIFIED COPD TYPE (HCC): ICD-10-CM

## 2023-07-19 RX ORDER — FLUTICASONE PROPIONATE AND SALMETEROL 50; 100 UG/1; UG/1
POWDER RESPIRATORY (INHALATION)
Qty: 60 EACH | Refills: 11 | Status: SHIPPED | OUTPATIENT
Start: 2023-07-19

## 2023-09-14 ENCOUNTER — TELEPHONE (OUTPATIENT)
Dept: HEALTH INFORMATION MANAGEMENT | Facility: OTHER | Age: 78
End: 2023-09-14

## 2023-11-13 ENCOUNTER — OFFICE VISIT (OUTPATIENT)
Dept: MEDICAL GROUP | Facility: MEDICAL CENTER | Age: 78
End: 2023-11-13
Payer: MEDICARE

## 2023-11-13 VITALS
OXYGEN SATURATION: 95 % | BODY MASS INDEX: 32.36 KG/M2 | RESPIRATION RATE: 16 BRPM | HEIGHT: 69 IN | HEART RATE: 66 BPM | DIASTOLIC BLOOD PRESSURE: 60 MMHG | SYSTOLIC BLOOD PRESSURE: 124 MMHG | WEIGHT: 218.48 LBS | TEMPERATURE: 97.6 F

## 2023-11-13 DIAGNOSIS — J32.9 CHRONIC CONGESTION OF PARANASAL SINUS: ICD-10-CM

## 2023-11-13 PROCEDURE — 99213 OFFICE O/P EST LOW 20 MIN: CPT | Performed by: STUDENT IN AN ORGANIZED HEALTH CARE EDUCATION/TRAINING PROGRAM

## 2023-11-13 PROCEDURE — 3074F SYST BP LT 130 MM HG: CPT | Performed by: STUDENT IN AN ORGANIZED HEALTH CARE EDUCATION/TRAINING PROGRAM

## 2023-11-13 PROCEDURE — 3078F DIAST BP <80 MM HG: CPT | Performed by: STUDENT IN AN ORGANIZED HEALTH CARE EDUCATION/TRAINING PROGRAM

## 2023-11-13 RX ORDER — TRIAMCINOLONE ACETONIDE 40 MG/ML
INJECTION, SUSPENSION INTRA-ARTICULAR; INTRAMUSCULAR
COMMUNITY
Start: 2023-08-24 | End: 2024-01-15

## 2023-11-13 RX ORDER — ASPIRIN 325 MG
1 TABLET ORAL
COMMUNITY
End: 2023-11-13

## 2023-11-13 ASSESSMENT — FIBROSIS 4 INDEX: FIB4 SCORE: 1.75

## 2023-11-13 NOTE — PROGRESS NOTES
"Subjective:     Chief Complaint   Patient presents with    Sinusitis     Face and eye swollen, nasal congestion, x chronic issue, ever since moving from [place]          HPI:   Josh presents today with     Chronic sinus congestion  This is a chronic problem, patient has been seen several times for this.  Patient last seen 1/31/2023. Patient notes that every morning he wakes up with eye discharge, watery runny eyes throughout the day, runny nose, worse in the morning.  He has been taking albuterol, Advair inhaler, Flonase daily, Singulair and over-the-counter Benadryl and Astelin nasal spray.     Patient notes that his symptoms are worse during the fall/winter when his heater is blowing air.  Patient notes that he does wash his sheets weekly, has no animals in the house, and has air purifier but no relief when heater is on.  Patient symptoms are likely secondary to allergies but due to sinus pressure and recurrent sinusitis.  Patient is wondering if there is any other medications he can take.  Patient has been referred but has not followed up.  Will refer to allergist.      ROS:  Gen: no fevers/chills  Pulm: no sob, no cough  CV: no chest pain, no palpitations  GI: no nausea/vomiting, no diarrhea      Objective:     Exam:  /60   Pulse 66   Temp 36.4 °C (97.6 °F) (Temporal)   Resp 16   Ht 1.74 m (5' 8.5\")   Wt 99.1 kg (218 lb 7.6 oz)   SpO2 95%   BMI 32.74 kg/m²  Body mass index is 32.74 kg/m².    Gen: Alert and oriented, No apparent distress.  Neck: Neck is supple without lymphadenopathy.  No tenderness to palpation of sinuses  Lungs: Normal effort, CTA bilaterally, no wheezes, rhonchi, or rales  CV: Regular rate and rhythm. No murmurs, rubs, or gallops.  Ext: No clubbing, cyanosis, edema.      Assessment & Plan:     78 y.o. male with the following -       1. Chronic congestion of paranasal sinus  Chronic, stable.  Will refer to allergist for further evaluation.  Reviewed patient's regimen including " Astelin, Singulair, Flonase, Benadryl.     No follow-ups on file.    Please note that this dictation was created using voice recognition software. I have made every reasonable attempt to correct obvious errors, but I expect that there are errors of grammar and possibly content that I did not discover before finalizing the note.

## 2023-11-17 ENCOUNTER — TELEPHONE (OUTPATIENT)
Dept: HEALTH INFORMATION MANAGEMENT | Facility: OTHER | Age: 78
End: 2023-11-17
Payer: MEDICARE

## 2023-11-27 DIAGNOSIS — J44.9 CHRONIC OBSTRUCTIVE PULMONARY DISEASE, UNSPECIFIED COPD TYPE (HCC): ICD-10-CM

## 2023-11-27 RX ORDER — MONTELUKAST SODIUM 10 MG/1
10 TABLET ORAL DAILY
Qty: 90 TABLET | Refills: 3 | Status: SHIPPED | OUTPATIENT
Start: 2023-11-27

## 2023-12-18 DIAGNOSIS — I73.9 PVD (PERIPHERAL VASCULAR DISEASE) (HCC): ICD-10-CM

## 2023-12-18 DIAGNOSIS — E78.2 MIXED HYPERLIPIDEMIA: ICD-10-CM

## 2023-12-19 RX ORDER — SIMVASTATIN 10 MG
10 TABLET ORAL EVERY EVENING
Qty: 100 TABLET | Refills: 3 | Status: SHIPPED | OUTPATIENT
Start: 2023-12-19

## 2024-01-11 ENCOUNTER — APPOINTMENT (OUTPATIENT)
Dept: MEDICAL GROUP | Facility: MEDICAL CENTER | Age: 79
End: 2024-01-11
Payer: MEDICARE

## 2024-01-15 ENCOUNTER — OFFICE VISIT (OUTPATIENT)
Dept: MEDICAL GROUP | Facility: MEDICAL CENTER | Age: 79
End: 2024-01-15
Payer: MEDICARE

## 2024-01-15 VITALS
DIASTOLIC BLOOD PRESSURE: 74 MMHG | TEMPERATURE: 97.2 F | HEIGHT: 68 IN | WEIGHT: 211 LBS | SYSTOLIC BLOOD PRESSURE: 138 MMHG | BODY MASS INDEX: 31.98 KG/M2 | OXYGEN SATURATION: 98 % | HEART RATE: 74 BPM

## 2024-01-15 DIAGNOSIS — E66.9 OBESITY (BMI 30-39.9): ICD-10-CM

## 2024-01-15 DIAGNOSIS — L91.8 CUTANEOUS SKIN TAGS: ICD-10-CM

## 2024-01-15 DIAGNOSIS — J30.89 ENVIRONMENTAL AND SEASONAL ALLERGIES: ICD-10-CM

## 2024-01-15 DIAGNOSIS — I73.9 PERIPHERAL ARTERIAL DISEASE (HCC): ICD-10-CM

## 2024-01-15 DIAGNOSIS — J44.9 CHRONIC OBSTRUCTIVE PULMONARY DISEASE, UNSPECIFIED COPD TYPE (HCC): ICD-10-CM

## 2024-01-15 DIAGNOSIS — F17.200 TOBACCO DEPENDENCE: ICD-10-CM

## 2024-01-15 PROCEDURE — 3078F DIAST BP <80 MM HG: CPT

## 2024-01-15 PROCEDURE — 99214 OFFICE O/P EST MOD 30 MIN: CPT

## 2024-01-15 PROCEDURE — 3075F SYST BP GE 130 - 139MM HG: CPT

## 2024-01-15 RX ORDER — NICOTINE 21 MG/24HR
1 PATCH, TRANSDERMAL 24 HOURS TRANSDERMAL EVERY 24 HOURS
Qty: 28 PATCH | Refills: 6 | Status: SHIPPED | OUTPATIENT
Start: 2024-01-15 | End: 2025-01-14

## 2024-01-15 ASSESSMENT — ENCOUNTER SYMPTOMS
PALPITATIONS: 0
FEVER: 0
COUGH: 0
CHILLS: 0
ORTHOPNEA: 0
SHORTNESS OF BREATH: 0

## 2024-01-15 ASSESSMENT — FIBROSIS 4 INDEX: FIB4 SCORE: 1.75

## 2024-01-15 ASSESSMENT — PATIENT HEALTH QUESTIONNAIRE - PHQ9: CLINICAL INTERPRETATION OF PHQ2 SCORE: 0

## 2024-01-15 NOTE — PROGRESS NOTES
Subjective:     CC: Establish Care (Pt states he has been having a lot of sinus issues. )      HPI:   Josh is a 78 y.o. male who presents today for:     Allergies: he has been dealing with allergies since he moved to [place] from [place] 25 years ago. He reports that for the last few years, he has noticed swelling of his eyes when he wakes up in the morning. He has trouble seeing for several minutes. He states he has tried OCT treatments, without improvement. He has tried several different soaps, laundry soaps, no lotions. No recent changes to housing. He has forced heating in the apartment. He states there is no filter on his heating/ ac unit. He has a referral to allergy testing and has an appointment next month.     COPD: he is still smoking 1/2 pack a day. He is working on quitting. He is still taking his Advair and his montelukast. He would like to quit smoking.     Skin tags: he has several skin tags on neck would like them removed.       Allergies: Azithromycin     Medications:   Current Outpatient Medications:     nicotine (NICODERM) 14 MG/24HR PATCH 24 HR, Place 1 Patch on the skin every 24 hours., Disp: 28 Patch, Rfl: 6    simvastatin (ZOCOR) 10 MG Tab, Take 1 Tablet by mouth every evening., Disp: 100 Tablet, Rfl: 3    montelukast (SINGULAIR) 10 MG Tab, Take 1 Tablet by mouth every day., Disp: 90 Tablet, Rfl: 3    Omeprazole Magnesium (PRILOSEC PO), , Disp: , Rfl:     ADVAIR DISKUS 100-50 MCG/ACT AEROSOL POWDER, BREATH ACTIVATED, INHALE 1 DOSE BY MOUTH EVERY 12 HOURS, Disp: 60 Each, Rfl: 11    doxazosin (CARDURA) 2 MG Tab, Take 1 Tablet by mouth 2 times a day., Disp: 180 Tablet, Rfl: 3    Azelastine HCl 137 MCG/SPRAY Solution, USE 1 SPRAY(S) INTO AFFECTED NOSTRIL(S)  TWICE DAILY, Disp: 30 mL, Rfl: 11    albuterol 108 (90 Base) MCG/ACT Aero Soln inhalation aerosol, INHALE 2 PUFFS BY MOUTH EVERY 6 HOURS AS NEEDED FOR SHORTNESS OF BREATH, Disp: 18 g, Rfl: 11    aspirin EC (ECOTRIN) 81 MG Tablet Delayed  "Response, Take 4 Tablets by mouth every day., Disp: , Rfl:     ranitidine (ZANTAC) 150 MG Tab, Take 1 Tab by mouth 2 times a day., Disp: 180 Tab, Rfl: 1      ROS:  Review of Systems   Constitutional:  Negative for chills and fever.   Respiratory:  Negative for cough and shortness of breath.    Cardiovascular:  Negative for chest pain, palpitations, orthopnea and leg swelling.       Objective:     Exam:  /74   Pulse 74   Temp 36.2 °C (97.2 °F) (Temporal)   Ht 1.727 m (5' 8\")   Wt 95.7 kg (211 lb)   SpO2 98%   BMI 32.08 kg/m²  Body mass index is 32.08 kg/m².    Physical Exam  Constitutional:       Appearance: He is normal weight.   Eyes:      Pupils: Pupils are equal, round, and reactive to light.   Cardiovascular:      Rate and Rhythm: Normal rate and regular rhythm.      Pulses: Normal pulses.      Heart sounds: Normal heart sounds.   Pulmonary:      Effort: Pulmonary effort is normal.      Breath sounds: Normal breath sounds.   Neurological:      Mental Status: He is alert and oriented to person, place, and time.   Psychiatric:         Mood and Affect: Mood normal.         Behavior: Behavior normal.           Assessment & Plan:     Josh a 78 y.o. male with the following -     1. Environmental and seasonal allergies  Chronic, unstable  Follow up for allergy testing. We discussed alternating allergy medications since he does get some relief, but reports having to take them every 2 to 3 days, as they become less effective the longer he uses them.  -Continue montelukast (SINGULAIR) 10 MG Tab, Take 1 Tablet by mouth every day., Disp: 90 Tablet, Rfl: 3  -Continue Azelastine HCl 137 MCG/SPRAY Solution, USE 1 SPRAY(S) INTO AFFECTED NOSTRIL(S)  TWICE DAILY, Disp: 30 mL, Rfl: 11    2. Tobacco dependence  Chronic, unstable  Patient would like to quit, requesting prescription for nicotine patches.  - nicotine (NICODERM) 14 MG/24HR PATCH 24 HR; Place 1 Patch on the skin every 24 hours.  Dispense: 28 Patch; Refill: " 6    3. Chronic obstructive pulmonary disease, unspecified COPD type (HCC)  Chronic, stable  -Continue   montelukast (SINGULAIR) 10 MG Tab, Take 1 Tablet by mouth every day., Disp: 90 Tablet, Rfl: 3  -Continue ADVAIR DISKUS 100-50 MCG/ACT AEROSOL POWDER, BREATH ACTIVATED, INHALE 1 DOSE BY MOUTH EVERY 12 HOURS, Disp: 60 Each, Rfl: 11    4. Cutaneous skin tags  Chronic, stable  Unable to perform cryotherapy today, out of liquid nitrogen in the office.  Recommended patient try OTC treatment, follow-up in the office once we get her supplies restocked.    5. Obesity (BMI 30-39.9)  Chronic, stable- Patient identified as having weight management issue.  Appropriate orders and counseling given.    6. Peripheral arterial disease (HCC)  Chronic, stable  Continue to monitor annually.  No signs of lower extremity edema today.      Anticipatory guidance included the following: Patient counseled about skin care, diet, supplements, smoking, drugs/alcohol use, safe sex and exercise.     Return in about 3 months (around 4/15/2024), or if symptoms worsen or fail to improve.    Please note that this dictation was created using voice recognition software. I have made every reasonable attempt to correct obvious errors, but I expect that there are errors of grammar and possibly content that I did not discover before finalizing the note.

## 2024-03-27 ENCOUNTER — TELEPHONE (OUTPATIENT)
Dept: HEALTH INFORMATION MANAGEMENT | Facility: OTHER | Age: 79
End: 2024-03-27
Payer: MEDICARE

## 2024-04-03 ENCOUNTER — DOCUMENTATION (OUTPATIENT)
Dept: HEALTH INFORMATION MANAGEMENT | Facility: OTHER | Age: 79
End: 2024-04-03
Payer: MEDICARE

## 2024-04-11 ENCOUNTER — TELEPHONE (OUTPATIENT)
Dept: FAMILY PLANNING/WOMEN'S HEALTH CLINIC | Facility: PHYSICIAN GROUP | Age: 79
End: 2024-04-11
Payer: MEDICARE

## 2024-04-11 NOTE — TELEPHONE ENCOUNTER
LVM on patient mobile phone to confirm appointment on 4/15/24 at 7:20 and provided address being at the [facility] at [address] [place] [address] [place].

## 2024-04-24 ENCOUNTER — OFFICE VISIT (OUTPATIENT)
Dept: MEDICAL GROUP | Facility: MEDICAL CENTER | Age: 79
End: 2024-04-24
Payer: MEDICARE

## 2024-04-24 VITALS
WEIGHT: 208.8 LBS | BODY MASS INDEX: 31.64 KG/M2 | DIASTOLIC BLOOD PRESSURE: 62 MMHG | SYSTOLIC BLOOD PRESSURE: 116 MMHG | HEART RATE: 70 BPM | HEIGHT: 68 IN | TEMPERATURE: 97.8 F | OXYGEN SATURATION: 96 %

## 2024-04-24 DIAGNOSIS — R73.9 HYPERGLYCEMIA: ICD-10-CM

## 2024-04-24 DIAGNOSIS — J32.9 CHRONIC CONGESTION OF PARANASAL SINUS: ICD-10-CM

## 2024-04-24 DIAGNOSIS — R01.1 MURMUR, CARDIAC: ICD-10-CM

## 2024-04-24 DIAGNOSIS — E66.9 OBESITY (BMI 30.0-34.9): ICD-10-CM

## 2024-04-24 DIAGNOSIS — J30.89 ENVIRONMENTAL AND SEASONAL ALLERGIES: ICD-10-CM

## 2024-04-24 DIAGNOSIS — E78.2 MIXED HYPERLIPIDEMIA: ICD-10-CM

## 2024-04-24 DIAGNOSIS — M16.11 OSTEOARTHRITIS OF RIGHT HIP, UNSPECIFIED OSTEOARTHRITIS TYPE: ICD-10-CM

## 2024-04-24 PROCEDURE — 3074F SYST BP LT 130 MM HG: CPT

## 2024-04-24 PROCEDURE — 3078F DIAST BP <80 MM HG: CPT

## 2024-04-24 PROCEDURE — 99214 OFFICE O/P EST MOD 30 MIN: CPT

## 2024-04-24 ASSESSMENT — ENCOUNTER SYMPTOMS
SHORTNESS OF BREATH: 0
COUGH: 0
ORTHOPNEA: 0
CHILLS: 0
PALPITATIONS: 0
FEVER: 0

## 2024-04-24 ASSESSMENT — FIBROSIS 4 INDEX: FIB4 SCORE: 1.75

## 2024-04-24 NOTE — PROGRESS NOTES
Subjective:     Verbal consent was acquired by the patient to use A-STAR ambient listening note generation during this visit Yes    CC: Follow-Up (Pt would like a referral to an Allergist closer to [facility].)      HPI:   Josh is a 78 y.o. male who presents today for:    History of Present Illness  The patient presents to the office for follow-up.    Allergies: The patient is seeking a referral to a different allergist in [place], a facility he was previously referred to. He continues to experience significant nasal congestion and pressure, with varying degrees of symptom severity. On other days, he wakes up with swollen eyes and a runny nose. Despite attempts at self-medication with over-the-counter allergy medications from [facility], he alternates between allergy tablets taken 2 to 3 times a week due to ineffectiveness. He does not recall experiencing allergies during his time in [place]. He also reports an acidic taste in the back of his throat when he does not rinse his mouth thoroughly after using the nasal spray at night.    Hip pain: The patient is contemplating receiving another injection for his hip and is contemplating a hip replacement, but is hesitant due to his wife's health issues. He experiences pain after walking half a block and walks with a limp after running around the store. He is receiving his injections at [facility].    Murmur: The patient was diagnosed with a heart murmur in his 20s. He denies experiencing chest pain or shortness of breath, or ankle swelling.         Allergies: Azithromycin     Medications:   Current Outpatient Medications:     nicotine (NICODERM) 14 MG/24HR PATCH 24 HR, Place 1 Patch on the skin every 24 hours., Disp: 28 Patch, Rfl: 6    simvastatin (ZOCOR) 10 MG Tab, Take 1 Tablet by mouth every evening., Disp: 100 Tablet, Rfl: 3    montelukast (SINGULAIR) 10 MG Tab, Take 1 Tablet by mouth every day., Disp: 90 Tablet, Rfl: 3    Omeprazole Magnesium (PRILOSEC PO), ,  "Disp: , Rfl:     ADVAIR DISKUS 100-50 MCG/ACT AEROSOL POWDER, BREATH ACTIVATED, INHALE 1 DOSE BY MOUTH EVERY 12 HOURS, Disp: 60 Each, Rfl: 11    doxazosin (CARDURA) 2 MG Tab, Take 1 Tablet by mouth 2 times a day., Disp: 180 Tablet, Rfl: 3    Azelastine HCl 137 MCG/SPRAY Solution, USE 1 SPRAY(S) INTO AFFECTED NOSTRIL(S)  TWICE DAILY, Disp: 30 mL, Rfl: 11    albuterol 108 (90 Base) MCG/ACT Aero Soln inhalation aerosol, INHALE 2 PUFFS BY MOUTH EVERY 6 HOURS AS NEEDED FOR SHORTNESS OF BREATH, Disp: 18 g, Rfl: 11    aspirin EC (ECOTRIN) 81 MG Tablet Delayed Response, Take 4 Tablets by mouth every day., Disp: , Rfl:     ranitidine (ZANTAC) 150 MG Tab, Take 1 Tab by mouth 2 times a day., Disp: 180 Tab, Rfl: 1      ROS:  Review of Systems   Constitutional:  Negative for chills and fever.   Respiratory:  Negative for cough and shortness of breath.    Cardiovascular:  Negative for chest pain, palpitations, orthopnea and leg swelling.       Objective:     Exam:  /62   Pulse 70   Temp 36.6 °C (97.8 °F) (Temporal)   Ht 1.727 m (5' 8\")   Wt 94.7 kg (208 lb 12.8 oz)   SpO2 96%   BMI 31.75 kg/m²  Body mass index is 31.75 kg/m².    Physical Exam  Constitutional:       Appearance: He is normal weight.   Eyes:      Pupils: Pupils are equal, round, and reactive to light.   Cardiovascular:      Rate and Rhythm: Normal rate and regular rhythm.      Pulses: Normal pulses.      Heart sounds: Murmur heard.   Pulmonary:      Effort: Pulmonary effort is normal.      Breath sounds: Normal breath sounds.   Neurological:      Mental Status: He is alert and oriented to person, place, and time.   Psychiatric:         Mood and Affect: Mood normal.         Behavior: Behavior normal.           Assessment & Plan:     Josh a 78 y.o. male with the following -     1. Environmental and seasonal allergies  2. Chronic congestion of paranasal sinus  Chronic, unstable  Over-the-counter allergy medication has not been effective in managing the " patient's symptoms. A new referral has been made to an allergist that is closer to his residence.  - Referral to Allergy    3. Osteoarthritis of right hip, unspecified osteoarthritis type  Chronic, unstable  It is recommended that the patient follow up with [facility] for a steroid injection and to discuss total hip replacement.  - follow up with [facility]    4. Mixed hyperlipidemia  Chronic, stable  The patient's condition is chronic and stable. The patient is advised to continue working on diet and exercise to aid in cholesterol management. He is to continue taking simvastatin 10 mg at bedtime.  - Lipid Profile; Future  - simvastatin (ZOCOR) 10 MG Tab, Take 1 Tablet by mouth every evening., Disp: 100 Tablet, Rfl: 3    5. Hyperglycemia  Chronic, stable  The patient's last A1c was stable. A recheck of the A1c levels will be conducted.  - HEMOGLOBIN A1C; Future    6. Obesity (BMI 30.0-34.9)  Chronic, stable  The patient is encouraged to continue working on diet and exercise to aid in weight management.  - TSH WITH REFLEX TO FT4; Future  - Comp Metabolic Panel; Future  - CBC WITHOUT DIFFERENTIAL; Future    7. Murmur, cardiac  Chronic, stable  The patient reports that his symptoms have been present since he was approximately 20 years old. He denies experiencing chest pain, shortness of breath, or fatigue. No ankle swelling was observed today during the examination.     Anticipatory guidance included the following: Patient counseled about skin care, diet, supplements, smoking, drugs/alcohol use, safe sex and exercise.     Return in about 3 months (around 7/24/2024).    Please note that this dictation was created using voice recognition software. I have made every reasonable attempt to correct obvious errors, but I expect that there are errors of grammar and possibly content that I did not discover before finalizing the note.

## 2024-05-21 ENCOUNTER — TELEPHONE (OUTPATIENT)
Dept: HEALTH INFORMATION MANAGEMENT | Facility: OTHER | Age: 79
End: 2024-05-21
Payer: MEDICARE

## 2024-05-22 DIAGNOSIS — J44.1 CHRONIC OBSTRUCTIVE PULMONARY DISEASE WITH ACUTE EXACERBATION (HCC): ICD-10-CM

## 2024-05-22 NOTE — TELEPHONE ENCOUNTER
Received request via: Patient    Was the patient seen in the last year in this department? Yes    Does the patient have an active prescription (recently filled or refills available) for medication(s) requested? No    Pharmacy Name: [facility]    Does the patient have [facility] Plus and need 100 day supply (blood pressure, diabetes and cholesterol meds only)? Yes, quantity updated to 100 days

## 2024-05-23 PROBLEM — E66.9 OBESITY (BMI 30-39.9): Status: ACTIVE | Noted: 2018-09-17

## 2024-05-23 RX ORDER — ALBUTEROL SULFATE 90 UG/1
2 AEROSOL, METERED RESPIRATORY (INHALATION) EVERY 6 HOURS PRN
Qty: 18 G | Refills: 11 | Status: SHIPPED | OUTPATIENT
Start: 2024-05-23

## 2024-06-10 ENCOUNTER — APPOINTMENT (OUTPATIENT)
Dept: FAMILY PLANNING/WOMEN'S HEALTH CLINIC | Facility: PHYSICIAN GROUP | Age: 79
End: 2024-06-10
Payer: MEDICARE

## 2024-06-10 VITALS
WEIGHT: 205 LBS | DIASTOLIC BLOOD PRESSURE: 60 MMHG | SYSTOLIC BLOOD PRESSURE: 138 MMHG | HEIGHT: 69 IN | BODY MASS INDEX: 30.36 KG/M2

## 2024-06-10 DIAGNOSIS — E78.2 MIXED HYPERLIPIDEMIA: ICD-10-CM

## 2024-06-10 DIAGNOSIS — E66.9 OBESITY (BMI 30-39.9): ICD-10-CM

## 2024-06-10 DIAGNOSIS — N40.1 BENIGN PROSTATIC HYPERPLASIA WITH LOWER URINARY TRACT SYMPTOMS, SYMPTOM DETAILS UNSPECIFIED: ICD-10-CM

## 2024-06-10 DIAGNOSIS — K21.00 GASTROESOPHAGEAL REFLUX DISEASE WITH ESOPHAGITIS, UNSPECIFIED WHETHER HEMORRHAGE: ICD-10-CM

## 2024-06-10 DIAGNOSIS — Z59.9 FINANCIAL DIFFICULTIES: ICD-10-CM

## 2024-06-10 DIAGNOSIS — R73.9 HYPERGLYCEMIA: ICD-10-CM

## 2024-06-10 DIAGNOSIS — J44.9 CHRONIC OBSTRUCTIVE PULMONARY DISEASE, UNSPECIFIED COPD TYPE (HCC): ICD-10-CM

## 2024-06-10 PROCEDURE — G0439 PPPS, SUBSEQ VISIT: HCPCS

## 2024-06-10 PROCEDURE — 3078F DIAST BP <80 MM HG: CPT

## 2024-06-10 PROCEDURE — 1125F AMNT PAIN NOTED PAIN PRSNT: CPT

## 2024-06-10 PROCEDURE — 3075F SYST BP GE 130 - 139MM HG: CPT

## 2024-06-10 SDOH — ECONOMIC STABILITY: TRANSPORTATION INSECURITY
IN THE PAST 12 MONTHS, HAS THE LACK OF TRANSPORTATION KEPT YOU FROM MEDICAL APPOINTMENTS OR FROM GETTING MEDICATIONS?: NO

## 2024-06-10 SDOH — ECONOMIC STABILITY: FOOD INSECURITY: WITHIN THE PAST 12 MONTHS, THE FOOD YOU BOUGHT JUST DIDN'T LAST AND YOU DIDN'T HAVE MONEY TO GET MORE.: NEVER TRUE

## 2024-06-10 SDOH — ECONOMIC STABILITY: TRANSPORTATION INSECURITY
IN THE PAST 12 MONTHS, HAS LACK OF TRANSPORTATION KEPT YOU FROM MEETINGS, WORK, OR FROM GETTING THINGS NEEDED FOR DAILY LIVING?: NO

## 2024-06-10 SDOH — ECONOMIC STABILITY: FOOD INSECURITY: WITHIN THE PAST 12 MONTHS, YOU WORRIED THAT YOUR FOOD WOULD RUN OUT BEFORE YOU GOT MONEY TO BUY MORE.: NEVER TRUE

## 2024-06-10 SDOH — ECONOMIC STABILITY: INCOME INSECURITY: HOW HARD IS IT FOR YOU TO PAY FOR THE VERY BASICS LIKE FOOD, HOUSING, MEDICAL CARE, AND HEATING?: SOMEWHAT HARD

## 2024-06-10 SDOH — ECONOMIC STABILITY - INCOME SECURITY: PROBLEM RELATED TO HOUSING AND ECONOMIC CIRCUMSTANCES, UNSPECIFIED: Z59.9

## 2024-06-10 ASSESSMENT — PATIENT HEALTH QUESTIONNAIRE - PHQ9: CLINICAL INTERPRETATION OF PHQ2 SCORE: 0

## 2024-06-10 ASSESSMENT — ACTIVITIES OF DAILY LIVING (ADL): BATHING_REQUIRES_ASSISTANCE: 0

## 2024-06-10 ASSESSMENT — FIBROSIS 4 INDEX: FIB4 SCORE: 1.75

## 2024-06-10 ASSESSMENT — ENCOUNTER SYMPTOMS: GENERAL WELL-BEING: GOOD

## 2024-06-10 ASSESSMENT — PAIN SCALES - GENERAL: PAINLEVEL: 4=SLIGHT-MODERATE PAIN

## 2024-06-10 NOTE — PROGRESS NOTES
Comprehensive Health Assessment Program     Josh Pang is a 78 y.o. here for his comprehensive health assessment.    Patient Active Problem List    Diagnosis Date Noted    Financial difficulties 06/10/2024    Murmur, cardiac 04/24/2024    Chronic congestion of paranasal sinus 04/24/2024    Environmental and seasonal allergies 04/24/2024    Tobacco use 02/10/2022    Hyperglycemia 09/16/2021    Osteoarthritis of right hip 09/16/2021    Obesity (BMI 30-39.9) 09/17/2018    Irritable bowel syndrome with diarrhea 05/31/2018    BPH (benign prostatic hyperplasia) 05/02/2012    COPD (chronic obstructive pulmonary disease) ([facility]) 05/02/2012    Lumbosacral radiculopathy 05/23/2011    Hyperlipidemia 12/21/2010    Peripheral arterial disease ([facility]) 09/22/2010    GERD (gastroesophageal reflux disease) 08/18/2009       Current Outpatient Medications   Medication Sig Dispense Refill    albuterol 108 (90 Base) MCG/ACT Aero Soln inhalation aerosol Inhale 2 Puffs every 6 hours as needed for Shortness of Breath. 18 g 11    nicotine (NICODERM) 14 MG/24HR PATCH 24 HR Place 1 Patch on the skin every 24 hours. 28 Patch 6    simvastatin (ZOCOR) 10 MG Tab Take 1 Tablet by mouth every evening. 100 Tablet 3    montelukast (SINGULAIR) 10 MG Tab Take 1 Tablet by mouth every day. 90 Tablet 3    Omeprazole Magnesium (PRILOSEC PO)       ADVAIR DISKUS 100-50 MCG/ACT AEROSOL POWDER, BREATH ACTIVATED INHALE 1 DOSE BY MOUTH EVERY 12 HOURS 60 Each 11    doxazosin (CARDURA) 2 MG Tab Take 1 Tablet by mouth 2 times a day. 180 Tablet 3    Azelastine HCl 137 MCG/SPRAY Solution USE 1 SPRAY(S) INTO AFFECTED NOSTRIL(S)  TWICE DAILY 30 mL 11    aspirin EC (ECOTRIN) 81 MG Tablet Delayed Response Take 4 Tablets by mouth every day.      ranitidine (ZANTAC) 150 MG Tab Take 1 Tab by mouth 2 times a day. 180 Tab 1     No current facility-administered medications for this visit.          Current supplements as per medication list.     Allergies:    Azithromycin  Social History     Tobacco Use    Smoking status: Every Day     Current packs/day: 0.50     Types: Cigarettes    Smokeless tobacco: Never   Vaping Use    Vaping status: Never Used   Substance Use Topics    Alcohol use: No     Comment: drinks ETOH occasionally    Drug use: No     Family History   Problem Relation Age of Onset    Diabetes Mother     Diabetes Father      Josh  has a past medical history of CATARACT, GERD (gastroesophageal reflux disease), Heart murmur, Hepatitis C, IVDU (intravenous drug user) (8/18/2009), Migraine, Psychiatric disorder, and Ulcer.   Past Surgical History:   Procedure Laterality Date    CHOLECYSTECTOMY      4 years ago    OPEN REDUCTION      ribs     OTHER      gerd    OTHER      catarract bilat 6/2012       Screening:  In the last six months have you experienced any leakage of urine? No    Depression Screening  Little interest or pleasure in doing things?  0 - not at all  Feeling down, depressed , or hopeless? 0 - not at all  Patient Health Questionnaire Score: 0     If depressive symptoms identified deferred to follow up visit unless specifically addressed in assessment and plan.    Interpretation of PHQ-9 Total Score   Score Severity   1-4 No Depression   5-9 Mild Depression   10-14 Moderate Depression   15-19 Moderately Severe Depression   20-27 Severe Depression    Screening for Cognitive Impairment  Do you or any of your friends or family members have any concern about your memory? No  Three Minute Recall (Leader, Season, Table) 3/3    Magnus clock face with all 12 numbers and set the hands to show 10 minutes after 11.  Yes    Cognitive concerns identified deferred for follow up unless specifically addressed in assessment and plan.    Fall Risk Assessment  Has the patient had two or more falls in the last year or any fall with injury in the last year?  No    Safety Assessment  Do you always wear your seatbelt?  Yes  Any changes to home needed to function safely?  No  Difficulty hearing.  No  Patient counseled about all safety risks that were identified.    Functional Assessment ADLs  Are there any barriers preventing you from cooking for yourself or meeting nutritional needs?  No.    Are there any barriers preventing you from driving safely or obtaining transportation?  No. Doesn't , uses uber  Are there any barriers preventing you from using a telephone or calling for help?  No    Are there any barriers preventing you from shopping?  No.    Are there any barriers preventing you from taking care of your own finances?  No    Are there any barriers preventing you from managing your medications?  No    Are there any barriers preventing you from showering, bathing or dressing yourself? No    Are there any barriers preventing you from doing housework or laundry? No  Are there any barriers preventing you from using the toilet?No  Are you currently engaging in any exercise or physical activity?  Yes. Right hip pain, uses cane.      Self-Assessment of Health  What is your perception of your health? Good  Do you sleep more than six hours a night? No  In the past 7 days, how much did pain keep you from doing your normal work? Some  Do you spend quality time with family or friends (virtually or in person)? No  Do you usually eat a heart healthy diet that constists of a variety of fruits, vegetables, whole grains and fiber? Yes  Do you eat foods high in fat and/or Fast Food more than three times per week? No    Advance Care Planning  Do you have an Advance Directive, Living Will, Durable Power of , or POLST? No                 Health Maintenance Summary            Overdue - IMM DTaP/Tdap/Td Vaccine (1 - Tdap) Never done      No completion history exists for this topic.              Overdue - Zoster (Shingles) Vaccines (1 of 2) Never done      No completion history exists for this topic.              Overdue - Hepatitis A Vaccine (Hep A) (2 of 3 - Hep A Twinrix risk 3-dose  series) Overdue since 4/6/2010 03/09/2010  Imm Admin: Hep A/HEP B Combined Vaccine (TwinRix)              Overdue - Annual Pulmonary Function Test / Spirometry (Yearly) Overdue since 2/5/2019 02/05/2018  PFT DICTATED RESULTS              Annual Wellness Visit (Yearly) Next due on 6/10/2025      06/10/2024  Level of Service: OR ANNUAL WELLNESS VISIT-INCLUDES PPPS SUBSEQUE*    03/15/2023  Level of Service: OR ANNUAL WELLNESS VISIT-INCLUDES PPPS SUBSEQUE*    05/18/2022  Level of Service: OR ANNUAL WELLNESS VISIT-INCLUDES PPPS SUBSEQUE*    09/16/2021  Level of Service: OR ANNUAL WELLNESS VISIT-INCLUDES PPPS SUBSEQUE*              Hepatitis C Screening  Completed      09/28/2017  Hepatitis C Antibody component of HEP C VIRUS ANTIBODY    09/28/2017  HCV RNA QUANT, PCR    09/28/2017  Done    09/14/2007  Hepatitis C Antibody component of HEPATITIS PANEL ACUTE (4 COMPONENTS)    07/18/2006  Hepatitis C Antibody component of HEPATITIS PANEL ACUTE (4 COMPONENTS)    Only the first 5 history entries have been loaded, but more history exists.              Pneumococcal Vaccine: 65+ Years (Series Information) Completed      05/08/2023  Imm Admin: Pneumococcal Conjugate Vaccine (PCV20)    02/05/2018  Imm Admin: Pneumococcal polysaccharide vaccine (PPSV-23)              Influenza Vaccine (Series Information) Completed      09/20/2023  Imm Admin: Influenza Vaccine Adult HD    09/27/2022  Imm Admin: Influenza Vaccine Adult HD    10/05/2021  Imm Admin: Influenza Vaccine Adult HD    11/05/2020  Imm Admin: Influenza Vaccine Adult HD    09/17/2018  Imm Admin: Influenza Vaccine Adult HD    Only the first 5 history entries have been loaded, but more history exists.              COVID-19 Vaccine (Series Information) Completed      11/28/2023  Imm Admin: Covid-19 Mrna (Spikevax) Moderna 12+ Years    09/27/2022  Imm Admin: PFIZER BIVALENT SARS-COV-2 VACCINE (12+)    05/02/2022  Imm Admin: MODERNA SARS-COV-2 VACCINE (12+)    10/26/2021   "Imm Admin: MODERNA SARS-COV-2 VACCINE (12+)    04/07/2021  Imm Admin: MODERNA SARS-COV-2 VACCINE (12+)    Only the first 5 history entries have been loaded, but more history exists.              HPV Vaccines (Series Information) Aged Out      No completion history exists for this topic.              Polio Vaccine (Inactivated Polio) (Series Information) Aged Out      No completion history exists for this topic.              Meningococcal Immunization (Series Information) Aged Out      No completion history exists for this topic.              Discontinued - Colorectal Cancer Screening  Discontinued        Frequency changed to Never automatically (Topic No Longer Applies)    11/11/2015  Colonoscopy (Done)              Discontinued - Hepatitis B Vaccine (Hep B)  Discontinued      03/09/2010  Imm Admin: Hep A/HEP B Combined Vaccine (TwinRix)                    Patient Care Team:  SALOME Mullen as PCP - General (Nurse Practitioner Family)  Lissa Fonseca M.D. as PCP - [facility] Paneled  Samina Looney D.O. (Geriatrics)  Harry Burt M.D. (Geriatrics)      Financial Resource Strain: Medium Risk (6/10/2024)    Overall Financial Resource Strain (CARDIA)     Difficulty of Paying Living Expenses: Somewhat hard      Transportation Needs: No Transportation Needs (6/10/2024)    PRAPARE - Transportation     Lack of Transportation (Medical): No     Lack of Transportation (Non-Medical): No      Food Insecurity: No Food Insecurity (6/10/2024)    Hunger Vital Sign     Worried About Running Out of Food in the Last Year: Never true     Ran Out of Food in the Last Year: Never true        Encounter Vitals  Blood Pressure : 138/60  Weight: 93 kg (205 lb)  Height: 175.3 cm (5' 9\")  BMI (Calculated): 30.27  Pain Score: 4=Slight-Moderate Pain     Alert, oriented in no acute distress.  Eye contact is good, speech goal directed, affect calm.    Assessment and Plan. The following treatment and monitoring plan is " recommended:  Hyperlipidemia  Chronic, stable. The patient denies any side effects from the current medication. Continue with current defined treatment plan: simvastatin (ZOCOR) 10 MG Tab. Follow-up at least annually.      COPD (chronic obstructive pulmonary disease) (HCC)  Chronic, stable. Continue with current defined treatment plan: albuterol 108 (90 Base) MCG/ACT Aero Soln inhalation aerosol, ADVAIR DISKUS 100-50 MCG/ACT AEROSOL POWDER, BREATH ACTIVATED. Follow-up at least annually.      GERD (Gastroesophageal Reflux Disease)  Chronic, stable. The patient reports that her symptoms are well controlled with current medication. Continue with current defined treatment plan: Omeprazole (PRILOSEC PO) . Follow-up at least annually.      BPH (benign prostatic hyperplasia)  Chronic, stable.  The patient reports that his symptoms are well controlled with current medication.  Continue with current defined treatment plan: doxazosin (CARDURA) 2 MG Tab. Follow-up at least annually.      Hyperglycemia  Chronic, stable. No current treatment. The patient is managing with diet. Follow-up at least annually.      Obesity (BMI 30-39.9)  Chronic, stable. Discussed eating a diet that contains many vegetables, fruits, and whole grains; includes low-fat dairy products, poultry,  fish, beans, non-tropical vegetable oils and nuts; and limit intake of sweets, sugar-sweetened drinks and red meats. He is working on exercise routine.   Follow-up at least annually.      Financial difficulties  The patient reported that he has difficulty affording his utilities. Referral to Care Management placed for assistance.       Services suggested: No services needed at this time  Health Care Screening: Age-appropriate preventive services recommended by USPTF and ACIP covered by Medicare were discussed today. Services ordered if indicated and agreed upon by the patient.  Referrals offered: Community-based lifestyle interventions to reduce health risks and  promote self-management and wellness, fall prevention, nutrition, physical activity, tobacco-use cessation, weight loss, and mental health services as per orders if indicated.    Discussion today about general wellness and lifestyle habits:    Prevent falls and reduce trip hazards; Cautioned about securing or removing rugs.  Have a working fire alarm and carbon monoxide detector.  Engage in regular physical activity and social activities.    Follow-up: Return for appointment with Primary Care Provider as needed.

## 2024-06-10 NOTE — ASSESSMENT & PLAN NOTE
The patient reported that he has difficulty affording his utilities. Referral to Care Management placed for assistance.

## 2024-06-10 NOTE — ASSESSMENT & PLAN NOTE
Chronic, stable. Discussed eating a diet that contains many vegetables, fruits, and whole grains; includes low-fat dairy products, poultry,  fish, beans, non-tropical vegetable oils and nuts; and limit intake of sweets, sugar-sweetened drinks and red meats. He is working on exercise routine.   Follow-up at least annually.

## 2024-06-10 NOTE — ASSESSMENT & PLAN NOTE
Chronic, stable. Continue with current defined treatment plan: albuterol 108 (90 Base) MCG/ACT Aero Soln inhalation aerosol, ADVAIR DISKUS 100-50 MCG/ACT AEROSOL POWDER, BREATH ACTIVATED. Follow-up at least annually.

## 2024-06-10 NOTE — ASSESSMENT & PLAN NOTE
Chronic, stable. No current treatment. The patient is managing with diet. Follow-up at least annually.

## 2024-06-10 NOTE — ASSESSMENT & PLAN NOTE
Chronic, stable. The patient reports that her symptoms are well controlled with current medication. Continue with current defined treatment plan: Omeprazole (PRILOSEC PO) . Follow-up at least annually.

## 2024-06-10 NOTE — ASSESSMENT & PLAN NOTE
Chronic, stable. The patient denies any side effects from the current medication. Continue with current defined treatment plan: simvastatin (ZOCOR) 10 MG Tab. Follow-up at least annually.

## 2024-06-10 NOTE — ASSESSMENT & PLAN NOTE
Chronic, stable.  The patient reports that his symptoms are well controlled with current medication.  Continue with current defined treatment plan: doxazosin (CARDURA) 2 MG Tab. Follow-up at least annually.

## 2024-06-13 ENCOUNTER — PATIENT OUTREACH (OUTPATIENT)
Dept: HEALTH INFORMATION MANAGEMENT | Facility: OTHER | Age: 79
End: 2024-06-13
Payer: MEDICARE

## 2024-06-14 DIAGNOSIS — N40.0 BPH WITHOUT URINARY OBSTRUCTION: ICD-10-CM

## 2024-06-14 DIAGNOSIS — J44.9 CHRONIC OBSTRUCTIVE PULMONARY DISEASE, UNSPECIFIED COPD TYPE (HCC): ICD-10-CM

## 2024-06-14 RX ORDER — FLUTICASONE PROPIONATE AND SALMETEROL 50; 100 UG/1; UG/1
1 POWDER RESPIRATORY (INHALATION) 2 TIMES DAILY
Qty: 60 EACH | Refills: 3 | Status: SHIPPED | OUTPATIENT
Start: 2024-06-14 | End: 2025-06-14

## 2024-06-14 RX ORDER — DOXAZOSIN 2 MG/1
2 TABLET ORAL 2 TIMES DAILY
Qty: 180 TABLET | Refills: 3 | Status: SHIPPED | OUTPATIENT
Start: 2024-06-14

## 2024-06-14 NOTE — TELEPHONE ENCOUNTER
Received request via: Patient    Was the patient seen in the last year in this department? Yes    Does the patient have an active prescription (recently filled or refills available) for medication(s) requested? No    Pharmacy Name: [facility]    Does the patient have alf Plus and need 100 day supply (blood pressure, diabetes and cholesterol meds only)? Medication is not for cholesterol, blood pressure or diabetes

## 2024-06-14 NOTE — PROGRESS NOTES
CHW tried calling the pt as requested by ARMANDO Douglas. Pt did not answer and this CHW left a VM requesting a return call. 6/13  CHW tried calling the pt as requested by ARMANDO Douglas. Pt did not answer and this CHW left a VM requesting a return call. 6/14  Community Health Worker Follow-Up    Reason for outreach: CHW triewd calling the pt as requested by ARMANDO Douglas.    CHW Interventions: CHW tried calling the pt several times to discuss resources. Pt did not answer after several attempts and this CHW can not send a eÃ‡ift message as pt does not have one.    Specific Resources Provided:  Housing/Shelter: n/a  Transportation: n/a  Food: n/a  Financial: n/a  Social Supports: N/A  Other: N/A    Plan: CHW will not follow at this time.

## 2024-06-19 ENCOUNTER — OFFICE VISIT (OUTPATIENT)
Dept: MEDICAL GROUP | Facility: MEDICAL CENTER | Age: 79
End: 2024-06-19
Payer: MEDICARE

## 2024-06-19 VITALS
WEIGHT: 203 LBS | TEMPERATURE: 96.6 F | HEIGHT: 69 IN | BODY MASS INDEX: 30.07 KG/M2 | RESPIRATION RATE: 18 BRPM | DIASTOLIC BLOOD PRESSURE: 66 MMHG | HEART RATE: 67 BPM | SYSTOLIC BLOOD PRESSURE: 146 MMHG | OXYGEN SATURATION: 97 %

## 2024-06-19 DIAGNOSIS — J32.0 MAXILLARY SINUSITIS, UNSPECIFIED CHRONICITY: ICD-10-CM

## 2024-06-19 DIAGNOSIS — M25.551 RIGHT HIP PAIN: ICD-10-CM

## 2024-06-19 PROCEDURE — 99214 OFFICE O/P EST MOD 30 MIN: CPT | Performed by: FAMILY MEDICINE

## 2024-06-19 PROCEDURE — 3077F SYST BP >= 140 MM HG: CPT | Performed by: FAMILY MEDICINE

## 2024-06-19 PROCEDURE — 3078F DIAST BP <80 MM HG: CPT | Performed by: FAMILY MEDICINE

## 2024-06-19 ASSESSMENT — FIBROSIS 4 INDEX: FIB4 SCORE: 1.75

## 2024-06-19 NOTE — PROGRESS NOTES
CC: Right hip pain, sinusitis    History of Present Illness  The patient presents for evaluation of multiple medical concerns.    The patient received a cortisone injection in his right hip approximately 2 weeks ago. He is contemplating a hip replacement at the [facility] due to the severity of the pain, which he describes as akin to a severe hip and knee pain. This is his third cortisone injection, and he was advised to consult his primary care provider for a referral. The earliest available appointment is scheduled for 09/2024. An x-ray was conducted post-injection. He has been managing the pain with aspirin, but requires assistance from his wife for mobility. Tylenol Arthritis has been discontinued due to gastrointestinal discomfort. He has no history of hip replacement surgery.    The patient also reports sinus issues, characterized by occasional nasal congestion upon waking. He has been using Flonase, which he reports as beneficial.        Patient Active Problem List    Diagnosis Date Noted    Financial difficulties 06/10/2024    Murmur, cardiac 04/24/2024    Chronic congestion of paranasal sinus 04/24/2024    Environmental and seasonal allergies 04/24/2024    Tobacco use 02/10/2022    Hyperglycemia 09/16/2021    Osteoarthritis of right hip 09/16/2021    Obesity (BMI 30-39.9) 09/17/2018    Irritable bowel syndrome with diarrhea 05/31/2018    BPH (benign prostatic hyperplasia) 05/02/2012    COPD (chronic obstructive pulmonary disease) ([facility]) 05/02/2012    Lumbosacral radiculopathy 05/23/2011    Hyperlipidemia 12/21/2010    Peripheral arterial disease (HCC) 09/22/2010    GERD (gastroesophageal reflux disease) 08/18/2009       Current Outpatient Medications   Medication Sig Dispense Refill    ADVAIR DISKUS 100-50 MCG/ACT AEROSOL POWDER, BREATH ACTIVATED Inhale 1 Puff 2 times a day. 60 Each 3    doxazosin (CARDURA) 2 MG Tab Take 1 Tablet by mouth 2 times a day. 180 Tablet 3    albuterol 108 (90 Base) MCG/ACT  "Aero Soln inhalation aerosol Inhale 2 Puffs every 6 hours as needed for Shortness of Breath. 18 g 11    nicotine (NICODERM) 14 MG/24HR PATCH 24 HR Place 1 Patch on the skin every 24 hours. 28 Patch 6    simvastatin (ZOCOR) 10 MG Tab Take 1 Tablet by mouth every evening. 100 Tablet 3    montelukast (SINGULAIR) 10 MG Tab Take 1 Tablet by mouth every day. 90 Tablet 3    Omeprazole Magnesium (PRILOSEC PO)       Azelastine HCl 137 MCG/SPRAY Solution USE 1 SPRAY(S) INTO AFFECTED NOSTRIL(S)  TWICE DAILY 30 mL 11    aspirin EC (ECOTRIN) 81 MG Tablet Delayed Response Take 4 Tablets by mouth every day.      ranitidine (ZANTAC) 150 MG Tab Take 1 Tab by mouth 2 times a day. 180 Tab 1     No current facility-administered medications for this visit.         Allergies as of 06/19/2024 - Reviewed 06/19/2024   Allergen Reaction Noted    Azithromycin  02/26/2021        ROS: Denies any chest pain, Shortness of breath, Changes bowel or bladder, Lower extremity edema.    Physical Exam:  BP (!) 146/66 (BP Location: Right arm, Patient Position: Sitting, BP Cuff Size: Adult)   Pulse 67   Temp 35.9 °C (96.6 °F) (Temporal)   Resp 18   Ht 1.753 m (5' 9\")   Wt 92.1 kg (203 lb)   SpO2 97%   BMI 29.98 kg/m²   Gen.: Well-developed, well-nourished, no apparent distress,pleasant and cooperative with the examination  Skin:  Warm and dry with good turgor. No rashes or suspicious lesions in visible areas  HEENT:Sinuses nontender with palpation, TMs clear, nares patent with pink mucosa and clear rhinorrhea,no septal deviation ,polyps or lesions. lips without lesions, oropharynx clear.  Right hip: Decreased range of motion, no local tenderness or swelling.        Assessment and Plan.   78 y.o. male     1. Right hip pain  Previous x-ray showed moderate to severe osteoarthritis of the right hip  Refer patient to orthopedist for possible hip replacement    - Referral to Orthopedics    2. Maxillary sinusitis, unspecified chronicity  Mild.  Continue " Flonase as needed, advised to normal saline/or saline irrigation  If no improvement patient will need oral antibiotics

## 2024-08-09 ENCOUNTER — TELEPHONE (OUTPATIENT)
Dept: MEDICAL GROUP | Facility: MEDICAL CENTER | Age: 79
End: 2024-08-09
Payer: MEDICARE

## 2024-08-09 NOTE — TELEPHONE ENCOUNTER
VOICEMAIL  1. Caller Name: Josh Pang                       Call Back Number: [phone] (home)      2. Message: Patient called asking if his ortho referral can be changed to [place] instead of [place].    3. Patient approves office to leave a detailed voicemail/MyChart message: yes

## 2024-08-09 NOTE — TELEPHONE ENCOUNTER
Caller Name: [facility]    [facility] called stating patients Advair isn't covered under his formulary coverage and they are asking for a different medication.

## 2024-08-12 DIAGNOSIS — J44.9 CHRONIC OBSTRUCTIVE PULMONARY DISEASE, UNSPECIFIED COPD TYPE (HCC): ICD-10-CM

## 2024-08-12 DIAGNOSIS — M25.551 RIGHT HIP PAIN: ICD-10-CM

## 2024-08-12 RX ORDER — BUDESONIDE AND FORMOTEROL FUMARATE DIHYDRATE 80; 4.5 UG/1; UG/1
2 AEROSOL RESPIRATORY (INHALATION) 2 TIMES DAILY
Qty: 6 EACH | Refills: 4 | Status: SHIPPED | OUTPATIENT
Start: 2024-08-12 | End: 2024-08-16

## 2024-08-16 ENCOUNTER — OFFICE VISIT (OUTPATIENT)
Dept: MEDICAL GROUP | Facility: MEDICAL CENTER | Age: 79
End: 2024-08-16
Payer: MEDICARE

## 2024-08-16 VITALS
WEIGHT: 200.18 LBS | BODY MASS INDEX: 29.65 KG/M2 | HEIGHT: 69 IN | DIASTOLIC BLOOD PRESSURE: 60 MMHG | TEMPERATURE: 97.5 F | HEART RATE: 73 BPM | SYSTOLIC BLOOD PRESSURE: 122 MMHG | OXYGEN SATURATION: 96 %

## 2024-08-16 DIAGNOSIS — J32.9 CHRONIC CONGESTION OF PARANASAL SINUS: ICD-10-CM

## 2024-08-16 DIAGNOSIS — J44.9 CHRONIC OBSTRUCTIVE PULMONARY DISEASE, UNSPECIFIED COPD TYPE (HCC): ICD-10-CM

## 2024-08-16 PROCEDURE — 3074F SYST BP LT 130 MM HG: CPT | Performed by: STUDENT IN AN ORGANIZED HEALTH CARE EDUCATION/TRAINING PROGRAM

## 2024-08-16 PROCEDURE — 3078F DIAST BP <80 MM HG: CPT | Performed by: STUDENT IN AN ORGANIZED HEALTH CARE EDUCATION/TRAINING PROGRAM

## 2024-08-16 PROCEDURE — 99213 OFFICE O/P EST LOW 20 MIN: CPT | Performed by: STUDENT IN AN ORGANIZED HEALTH CARE EDUCATION/TRAINING PROGRAM

## 2024-08-16 RX ORDER — BUDESONIDE AND FORMOTEROL FUMARATE DIHYDRATE 80; 4.5 UG/1; UG/1
2 AEROSOL RESPIRATORY (INHALATION) 2 TIMES DAILY
Qty: 6 EACH | Refills: 4 | Status: CANCELLED | OUTPATIENT
Start: 2024-08-16 | End: 2025-09-20

## 2024-08-16 RX ORDER — FLUTICASONE FUROATE AND VILANTEROL 100; 25 UG/1; UG/1
1 POWDER RESPIRATORY (INHALATION) DAILY
Qty: 180 EACH | Refills: 3 | Status: SHIPPED | OUTPATIENT
Start: 2024-08-16

## 2024-08-16 RX ORDER — AZELASTINE HYDROCHLORIDE 137 UG/1
1 SPRAY, METERED NASAL DAILY
Qty: 30 ML | Refills: 11 | Status: SHIPPED | OUTPATIENT
Start: 2024-08-16

## 2024-08-16 RX ORDER — TIOTROPIUM BROMIDE 18 UG/1
18 CAPSULE ORAL; RESPIRATORY (INHALATION) DAILY
Qty: 30 CAPSULE | Refills: 3 | Status: SHIPPED | OUTPATIENT
Start: 2024-08-16

## 2024-08-16 RX ORDER — ALBUTEROL SULFATE 90 UG/1
2 AEROSOL, METERED RESPIRATORY (INHALATION) EVERY 6 HOURS PRN
Qty: 18 G | Refills: 11 | Status: SHIPPED | OUTPATIENT
Start: 2024-08-16

## 2024-08-16 ASSESSMENT — ENCOUNTER SYMPTOMS
WHEEZING: 0
WEIGHT LOSS: 0
SHORTNESS OF BREATH: 0
CHILLS: 0
FEVER: 0
DIZZINESS: 0
HEADACHES: 0
PALPITATIONS: 0
VOMITING: 0
NAUSEA: 0

## 2024-08-16 ASSESSMENT — FIBROSIS 4 INDEX: FIB4 SCORE: 1.75

## 2024-09-18 ENCOUNTER — APPOINTMENT (OUTPATIENT)
Dept: MEDICAL GROUP | Facility: MEDICAL CENTER | Age: 79
End: 2024-09-18
Payer: MEDICARE

## 2024-09-18 ENCOUNTER — OFFICE VISIT (OUTPATIENT)
Dept: MEDICAL GROUP | Facility: MEDICAL CENTER | Age: 79
End: 2024-09-18
Payer: MEDICARE

## 2024-09-18 VITALS
DIASTOLIC BLOOD PRESSURE: 56 MMHG | RESPIRATION RATE: 16 BRPM | TEMPERATURE: 97.2 F | HEIGHT: 69 IN | WEIGHT: 201.5 LBS | HEART RATE: 70 BPM | OXYGEN SATURATION: 96 % | BODY MASS INDEX: 29.84 KG/M2 | SYSTOLIC BLOOD PRESSURE: 112 MMHG

## 2024-09-18 DIAGNOSIS — Z72.0 TOBACCO USE: ICD-10-CM

## 2024-09-18 DIAGNOSIS — J32.9 CHRONIC CONGESTION OF PARANASAL SINUS: ICD-10-CM

## 2024-09-18 DIAGNOSIS — Z86.19 HISTORY OF HEPATITIS C: ICD-10-CM

## 2024-09-18 DIAGNOSIS — J44.9 CHRONIC OBSTRUCTIVE PULMONARY DISEASE, UNSPECIFIED COPD TYPE (HCC): ICD-10-CM

## 2024-09-18 PROCEDURE — 3078F DIAST BP <80 MM HG: CPT | Performed by: STUDENT IN AN ORGANIZED HEALTH CARE EDUCATION/TRAINING PROGRAM

## 2024-09-18 PROCEDURE — 99214 OFFICE O/P EST MOD 30 MIN: CPT | Performed by: STUDENT IN AN ORGANIZED HEALTH CARE EDUCATION/TRAINING PROGRAM

## 2024-09-18 PROCEDURE — 3074F SYST BP LT 130 MM HG: CPT | Performed by: STUDENT IN AN ORGANIZED HEALTH CARE EDUCATION/TRAINING PROGRAM

## 2024-09-18 RX ORDER — CELECOXIB 200 MG/1
200 CAPSULE ORAL
COMMUNITY
Start: 2024-08-27

## 2024-09-18 RX ORDER — FLUTICASONE PROPIONATE 50 MCG
1 SPRAY, SUSPENSION (ML) NASAL DAILY
COMMUNITY

## 2024-09-18 RX ORDER — FAMOTIDINE 20 MG/1
20 TABLET, FILM COATED ORAL 2 TIMES DAILY
COMMUNITY

## 2024-09-18 ASSESSMENT — ENCOUNTER SYMPTOMS
PALPITATIONS: 0
WHEEZING: 0
SHORTNESS OF BREATH: 0
CHILLS: 0
VOMITING: 0
DIZZINESS: 0
WEIGHT LOSS: 0
HEADACHES: 0
NAUSEA: 0
FEVER: 0

## 2024-09-18 ASSESSMENT — FIBROSIS 4 INDEX: FIB4 SCORE: 1.78

## 2024-09-18 NOTE — PROGRESS NOTES
"Subjective:     CC: last seen for acute visit    HPI:   Josh presents today with    PMH HLD, IFG, ?COPD ( last pft 2018 read normal), tobacco use, GERD, hep C report s/p treatment ( MR abdomen done in 2007 wo cirrhosis)   Specialist     Melissa is helping   - Less coughing, previously getting up and coughing up phlegm    Sinusitis  - he report history of sneezing, itchy eyes   - he report he take 2 generic allergy pill in the morning  - azelastine was prescribed last visit with some improves  - adherent to flluticasone  - has nasal rinse but does not use regularly    Tobacco  - down to 0.5PPD  - wear patch  - use to use chantix- bad dreams         Health Maintenance:     ROS:  Review of Systems   Constitutional:  Negative for chills, fever and weight loss.   HENT:  Negative for hearing loss.    Respiratory:  Negative for shortness of breath and wheezing.    Cardiovascular:  Negative for chest pain and palpitations.   Gastrointestinal:  Negative for nausea and vomiting.   Genitourinary:  Negative for frequency and urgency.   Skin:  Negative for rash.   Neurological:  Negative for dizziness and headaches.       Objective:     Exam:  /56 (BP Location: Left arm)   Pulse 70   Temp 36.2 °C (97.2 °F)   Resp 16   Ht 1.753 m (5' 9\")   Wt 91.4 kg (201 lb 8 oz)   SpO2 96%   BMI 29.76 kg/m²  Body mass index is 29.76 kg/m².    Physical Exam  Constitutional:       Appearance: Normal appearance.   Cardiovascular:      Rate and Rhythm: Normal rate and regular rhythm.   Pulmonary:      Effort: Pulmonary effort is normal.      Breath sounds: Normal breath sounds.   Musculoskeletal:      Cervical back: Normal range of motion and neck supple.   Lymphadenopathy:      Cervical: No cervical adenopathy.   Neurological:      Mental Status: He is alert.         Labs:     Assessment & Plan:     79 y.o. male with the following -     1. Tobacco use  Chronic stable   Working on cessation    2. Chronic obstructive pulmonary disease, " unspecified COPD type (HCC)  Chronic stable  Report morning, cough, phlegm improved with breo. Has not started spiriva. Prior PFT 2018 interpreted as normal. Patient not interested in repeat pft at this time     3. Chronic congestion of paranasal sinus  Chronic stable  In setting of allergy  Recc intensifying conservative management as noted in hpi         Return in about 6 months (around 3/18/2025) for Lab review, Med check.    Please note that this dictation was created using voice recognition software. I have made every reasonable attempt to correct obvious errors, but I expect that there are errors of grammar and possibly content that I did not discover before finalizing the note.

## 2024-10-07 ENCOUNTER — PATIENT OUTREACH (OUTPATIENT)
Dept: HEALTH INFORMATION MANAGEMENT | Facility: OTHER | Age: 79
End: 2024-10-07
Payer: MEDICARE

## 2024-10-07 DIAGNOSIS — J44.1 CHRONIC OBSTRUCTIVE PULMONARY DISEASE WITH ACUTE EXACERBATION (HCC): ICD-10-CM

## 2024-10-07 SDOH — ECONOMIC STABILITY: FOOD INSECURITY: WITHIN THE PAST 12 MONTHS, YOU WORRIED THAT YOUR FOOD WOULD RUN OUT BEFORE YOU GOT MONEY TO BUY MORE.: NEVER TRUE

## 2024-10-07 SDOH — HEALTH STABILITY: PHYSICAL HEALTH: ON AVERAGE, HOW MANY DAYS PER WEEK DO YOU ENGAGE IN MODERATE TO STRENUOUS EXERCISE (LIKE A BRISK WALK)?: 3 DAYS

## 2024-10-07 SDOH — ECONOMIC STABILITY: HOUSING INSECURITY: AT ANY TIME IN THE PAST 12 MONTHS, WERE YOU HOMELESS OR LIVING IN A SHELTER (INCLUDING NOW)?: NO

## 2024-10-07 SDOH — HEALTH STABILITY: MENTAL HEALTH: HOW MANY STANDARD DRINKS CONTAINING ALCOHOL DO YOU HAVE ON A TYPICAL DAY?: PATIENT DOES NOT DRINK

## 2024-10-07 SDOH — SOCIAL STABILITY: SOCIAL INSECURITY: WITHIN THE LAST YEAR, HAVE YOU BEEN HUMILIATED OR EMOTIONALLY ABUSED IN OTHER WAYS BY YOUR PARTNER OR EX-PARTNER?: NO

## 2024-10-07 SDOH — SOCIAL STABILITY: SOCIAL INSECURITY
WITHIN THE LAST YEAR, HAVE TO BEEN RAPED OR FORCED TO HAVE ANY KIND OF SEXUAL ACTIVITY BY YOUR PARTNER OR EX-PARTNER?: NO

## 2024-10-07 SDOH — ECONOMIC STABILITY: HOUSING INSECURITY: IN THE PAST 12 MONTHS, HOW MANY TIMES HAVE YOU MOVED WHERE YOU WERE LIVING?: 0

## 2024-10-07 SDOH — ECONOMIC STABILITY: INCOME INSECURITY: HOW HARD IS IT FOR YOU TO PAY FOR THE VERY BASICS LIKE FOOD, HOUSING, MEDICAL CARE, AND HEATING?: NOT VERY HARD

## 2024-10-07 SDOH — HEALTH STABILITY: MENTAL HEALTH
HOW OFTEN DO YOU NEED TO HAVE SOMEONE HELP YOU WHEN YOU READ INSTRUCTIONS, PAMPHLETS, OR OTHER WRITTEN MATERIAL FROM YOUR DOCTOR OR PHARMACY?: SOMETIMES

## 2024-10-07 SDOH — HEALTH STABILITY: MENTAL HEALTH: HOW OFTEN DO YOU HAVE 6 OR MORE DRINKS ON ONE OCCASION?: NEVER

## 2024-10-07 SDOH — ECONOMIC STABILITY: INCOME INSECURITY: IN THE PAST 12 MONTHS, HAS THE ELECTRIC, GAS, OIL, OR WATER COMPANY THREATENED TO SHUT OFF SERVICE IN YOUR HOME?: NO

## 2024-10-07 SDOH — SOCIAL STABILITY: SOCIAL NETWORK: ARE YOU MARRIED, WIDOWED, DIVORCED, SEPARATED, NEVER MARRIED, OR LIVING WITH A PARTNER?: LIVING WITH PARTNER

## 2024-10-07 SDOH — SOCIAL STABILITY: SOCIAL NETWORK: HOW OFTEN DO YOU ATTENT MEETINGS OF THE CLUB OR ORGANIZATION YOU BELONG TO?: NEVER

## 2024-10-07 SDOH — HEALTH STABILITY: PHYSICAL HEALTH: ON AVERAGE, HOW MANY MINUTES DO YOU ENGAGE IN EXERCISE AT THIS LEVEL?: 90 MIN

## 2024-10-07 SDOH — ECONOMIC STABILITY: FOOD INSECURITY: WITHIN THE PAST 12 MONTHS, THE FOOD YOU BOUGHT JUST DIDN'T LAST AND YOU DIDN'T HAVE MONEY TO GET MORE.: NEVER TRUE

## 2024-10-07 SDOH — HEALTH STABILITY: MENTAL HEALTH: HOW OFTEN DO YOU HAVE A DRINK CONTAINING ALCOHOL?: NEVER

## 2024-10-07 SDOH — SOCIAL STABILITY: SOCIAL NETWORK: HOW OFTEN DO YOU GET TOGETHER WITH FRIENDS OR RELATIVES?: THREE TIMES A WEEK

## 2024-10-07 SDOH — SOCIAL STABILITY: SOCIAL NETWORK
DO YOU BELONG TO ANY CLUBS OR ORGANIZATIONS SUCH AS CHURCH GROUPS UNIONS, FRATERNAL OR ATHLETIC GROUPS, OR SCHOOL GROUPS?: NO

## 2024-10-07 SDOH — SOCIAL STABILITY: SOCIAL NETWORK: HOW OFTEN DO YOU ATTEND CHURCH OR RELIGIOUS SERVICES?: NEVER

## 2024-10-07 SDOH — ECONOMIC STABILITY: INCOME INSECURITY: IN THE LAST 12 MONTHS, WAS THERE A TIME WHEN YOU WERE NOT ABLE TO PAY THE MORTGAGE OR RENT ON TIME?: NO

## 2024-10-07 SDOH — SOCIAL STABILITY: SOCIAL INSECURITY
WITHIN THE LAST YEAR, HAVE YOU BEEN KICKED, HIT, SLAPPED, OR OTHERWISE PHYSICALLY HURT BY YOUR PARTNER OR EX-PARTNER?: NO

## 2024-10-07 SDOH — SOCIAL STABILITY: SOCIAL INSECURITY: WITHIN THE LAST YEAR, HAVE YOU BEEN AFRAID OF YOUR PARTNER OR EX-PARTNER?: NO

## 2024-10-07 SDOH — SOCIAL STABILITY: SOCIAL NETWORK
IN A TYPICAL WEEK, HOW MANY TIMES DO YOU TALK ON THE PHONE WITH FAMILY, FRIENDS, OR NEIGHBORS?: MORE THAN THREE TIMES A WEEK

## 2024-10-07 ASSESSMENT — LIFESTYLE VARIABLES
AUDIT-C TOTAL SCORE: 0
SKIP TO QUESTIONS 9-10: 1

## 2024-10-15 ENCOUNTER — PATIENT OUTREACH (OUTPATIENT)
Dept: HEALTH INFORMATION MANAGEMENT | Facility: OTHER | Age: 79
End: 2024-10-15
Payer: MEDICARE

## 2024-10-15 DIAGNOSIS — J44.1 CHRONIC OBSTRUCTIVE PULMONARY DISEASE WITH ACUTE EXACERBATION (HCC): ICD-10-CM

## 2024-10-15 DIAGNOSIS — E78.2 MIXED HYPERLIPIDEMIA: ICD-10-CM

## 2024-10-23 ENCOUNTER — PATIENT OUTREACH (OUTPATIENT)
Dept: HEALTH INFORMATION MANAGEMENT | Facility: OTHER | Age: 79
End: 2024-10-23
Payer: MEDICARE

## 2024-10-23 DIAGNOSIS — E78.2 MIXED HYPERLIPIDEMIA: ICD-10-CM

## 2024-10-23 DIAGNOSIS — J44.1 CHRONIC OBSTRUCTIVE PULMONARY DISEASE WITH ACUTE EXACERBATION (HCC): ICD-10-CM

## 2024-10-23 PROCEDURE — 99490 CHRNC CARE MGMT STAFF 1ST 20: CPT

## 2024-10-30 ENCOUNTER — PATIENT OUTREACH (OUTPATIENT)
Dept: HEALTH INFORMATION MANAGEMENT | Facility: OTHER | Age: 79
End: 2024-10-30
Payer: MEDICARE

## 2024-10-30 DIAGNOSIS — J44.1 CHRONIC OBSTRUCTIVE PULMONARY DISEASE WITH ACUTE EXACERBATION (HCC): ICD-10-CM

## 2024-11-03 ENCOUNTER — APPOINTMENT (OUTPATIENT)
Dept: RADIOLOGY | Facility: MEDICAL CENTER | Age: 79
DRG: 101 | End: 2024-11-03
Attending: EMERGENCY MEDICINE
Payer: MEDICARE

## 2024-11-03 ENCOUNTER — APPOINTMENT (OUTPATIENT)
Dept: RADIOLOGY | Facility: MEDICAL CENTER | Age: 79
DRG: 101 | End: 2024-11-03
Payer: MEDICARE

## 2024-11-03 ENCOUNTER — HOSPITAL ENCOUNTER (INPATIENT)
Facility: MEDICAL CENTER | Age: 79
LOS: 1 days | DRG: 101 | End: 2024-11-04
Attending: EMERGENCY MEDICINE
Payer: MEDICARE

## 2024-11-03 DIAGNOSIS — R41.82 ALTERED MENTAL STATUS, UNSPECIFIED ALTERED MENTAL STATUS TYPE: ICD-10-CM

## 2024-11-03 DIAGNOSIS — R56.9 SEIZURE-LIKE ACTIVITY (HCC): ICD-10-CM

## 2024-11-03 DIAGNOSIS — R47.89 WORD FINDING DIFFICULTY: ICD-10-CM

## 2024-11-03 PROBLEM — I65.29 CAROTID STENOSIS: Status: ACTIVE | Noted: 2024-11-03

## 2024-11-03 LAB
ALBUMIN SERPL BCP-MCNC: 4 G/DL (ref 3.2–4.9)
ALBUMIN/GLOB SERPL: 1.3 G/DL
ALP SERPL-CCNC: 52 U/L (ref 30–99)
ALT SERPL-CCNC: 10 U/L (ref 2–50)
AMPHET UR QL SCN: NEGATIVE
ANION GAP SERPL CALC-SCNC: 12 MMOL/L (ref 7–16)
AST SERPL-CCNC: 18 U/L (ref 12–45)
BARBITURATES UR QL SCN: NEGATIVE
BASOPHILS # BLD AUTO: 1.7 % (ref 0–1.8)
BASOPHILS # BLD: 0.15 K/UL (ref 0–0.12)
BENZODIAZ UR QL SCN: NEGATIVE
BILIRUB SERPL-MCNC: 0.5 MG/DL (ref 0.1–1.5)
BUN SERPL-MCNC: 24 MG/DL (ref 8–22)
BZE UR QL SCN: NEGATIVE
CALCIUM ALBUM COR SERPL-MCNC: 8.8 MG/DL (ref 8.5–10.5)
CALCIUM SERPL-MCNC: 8.8 MG/DL (ref 8.5–10.5)
CANNABINOIDS UR QL SCN: NEGATIVE
CHLORIDE SERPL-SCNC: 107 MMOL/L (ref 96–112)
CK SERPL-CCNC: 144 U/L (ref 0–154)
CO2 SERPL-SCNC: 20 MMOL/L (ref 20–33)
CREAT SERPL-MCNC: 0.92 MG/DL (ref 0.5–1.4)
EKG IMPRESSION: NORMAL
EKG IMPRESSION: NORMAL
EOSINOPHIL # BLD AUTO: 0.23 K/UL (ref 0–0.51)
EOSINOPHIL NFR BLD: 2.6 % (ref 0–6.9)
ERYTHROCYTE [DISTWIDTH] IN BLOOD BY AUTOMATED COUNT: 47.9 FL (ref 35.9–50)
ETHANOL BLD-MCNC: <10.1 MG/DL
FENTANYL UR QL: NEGATIVE
GFR SERPLBLD CREATININE-BSD FMLA CKD-EPI: 85 ML/MIN/1.73 M 2
GLOBULIN SER CALC-MCNC: 3.1 G/DL (ref 1.9–3.5)
GLUCOSE SERPL-MCNC: 114 MG/DL (ref 65–99)
HCT VFR BLD AUTO: 48.2 % (ref 42–52)
HGB BLD-MCNC: 16.6 G/DL (ref 14–18)
LYMPHOCYTES # BLD AUTO: 1.13 K/UL (ref 1–4.8)
LYMPHOCYTES NFR BLD: 12.8 % (ref 22–41)
MANUAL DIFF BLD: NORMAL
MCH RBC QN AUTO: 32.6 PG (ref 27–33)
MCHC RBC AUTO-ENTMCNC: 34.4 G/DL (ref 32.3–36.5)
MCV RBC AUTO: 94.7 FL (ref 81.4–97.8)
METHADONE UR QL SCN: POSITIVE
MONOCYTES # BLD AUTO: 0.53 K/UL (ref 0–0.85)
MONOCYTES NFR BLD AUTO: 6 % (ref 0–13.4)
MORPHOLOGY BLD-IMP: NORMAL
MYELOCYTES NFR BLD MANUAL: 0.8 %
NEUTROPHILS # BLD AUTO: 6.7 K/UL (ref 1.82–7.42)
NEUTROPHILS NFR BLD: 76.1 % (ref 44–72)
NRBC # BLD AUTO: 0 K/UL
NRBC BLD-RTO: 0 /100 WBC (ref 0–0.2)
OPIATES UR QL SCN: NEGATIVE
OXYCODONE UR QL SCN: NEGATIVE
PCP UR QL SCN: NEGATIVE
PLATELET # BLD AUTO: 216 K/UL (ref 164–446)
PLATELET BLD QL SMEAR: NORMAL
PMV BLD AUTO: 9.6 FL (ref 9–12.9)
POTASSIUM SERPL-SCNC: 4 MMOL/L (ref 3.6–5.5)
PROPOXYPH UR QL SCN: NEGATIVE
PROT SERPL-MCNC: 7.1 G/DL (ref 6–8.2)
RBC # BLD AUTO: 5.09 M/UL (ref 4.7–6.1)
RBC BLD AUTO: NORMAL
SODIUM SERPL-SCNC: 139 MMOL/L (ref 135–145)
TROPONIN T SERPL-MCNC: 16 NG/L (ref 6–19)
WBC # BLD AUTO: 8.8 K/UL (ref 4.8–10.8)

## 2024-11-03 PROCEDURE — 99406 BEHAV CHNG SMOKING 3-10 MIN: CPT

## 2024-11-03 PROCEDURE — 94640 AIRWAY INHALATION TREATMENT: CPT

## 2024-11-03 PROCEDURE — 93005 ELECTROCARDIOGRAM TRACING: CPT

## 2024-11-03 PROCEDURE — 80053 COMPREHEN METABOLIC PANEL: CPT

## 2024-11-03 PROCEDURE — 4A00X4Z MEASUREMENT OF CENTRAL NERVOUS ELECTRICAL ACTIVITY, EXTERNAL APPROACH: ICD-10-PCS | Performed by: PSYCHIATRY & NEUROLOGY

## 2024-11-03 PROCEDURE — 700111 HCHG RX REV CODE 636 W/ 250 OVERRIDE (IP): Mod: JZ

## 2024-11-03 PROCEDURE — 99407 BEHAV CHNG SMOKING > 10 MIN: CPT

## 2024-11-03 PROCEDURE — 770020 HCHG ROOM/CARE - TELE (206)

## 2024-11-03 PROCEDURE — 93005 ELECTROCARDIOGRAM TRACING: CPT | Performed by: EMERGENCY MEDICINE

## 2024-11-03 PROCEDURE — 700102 HCHG RX REV CODE 250 W/ 637 OVERRIDE(OP)

## 2024-11-03 PROCEDURE — 95816 EEG AWAKE AND DROWSY: CPT | Performed by: PSYCHIATRY & NEUROLOGY

## 2024-11-03 PROCEDURE — 700117 HCHG RX CONTRAST REV CODE 255: Performed by: EMERGENCY MEDICINE

## 2024-11-03 PROCEDURE — 80307 DRUG TEST PRSMV CHEM ANLYZR: CPT

## 2024-11-03 PROCEDURE — 82550 ASSAY OF CK (CPK): CPT

## 2024-11-03 PROCEDURE — 70496 CT ANGIOGRAPHY HEAD: CPT

## 2024-11-03 PROCEDURE — 99285 EMERGENCY DEPT VISIT HI MDM: CPT

## 2024-11-03 PROCEDURE — 85007 BL SMEAR W/DIFF WBC COUNT: CPT

## 2024-11-03 PROCEDURE — 84484 ASSAY OF TROPONIN QUANT: CPT

## 2024-11-03 PROCEDURE — 94760 N-INVAS EAR/PLS OXIMETRY 1: CPT

## 2024-11-03 PROCEDURE — 71045 X-RAY EXAM CHEST 1 VIEW: CPT

## 2024-11-03 PROCEDURE — 95816 EEG AWAKE AND DROWSY: CPT | Mod: 26 | Performed by: PSYCHIATRY & NEUROLOGY

## 2024-11-03 PROCEDURE — 70551 MRI BRAIN STEM W/O DYE: CPT

## 2024-11-03 PROCEDURE — A9270 NON-COVERED ITEM OR SERVICE: HCPCS

## 2024-11-03 PROCEDURE — 99223 1ST HOSP IP/OBS HIGH 75: CPT | Mod: 25

## 2024-11-03 PROCEDURE — 85027 COMPLETE CBC AUTOMATED: CPT

## 2024-11-03 PROCEDURE — 82077 ASSAY SPEC XCP UR&BREATH IA: CPT

## 2024-11-03 PROCEDURE — 70498 CT ANGIOGRAPHY NECK: CPT

## 2024-11-03 PROCEDURE — 36415 COLL VENOUS BLD VENIPUNCTURE: CPT

## 2024-11-03 RX ORDER — AMOXICILLIN 250 MG
2 CAPSULE ORAL EVERY EVENING
Status: DISCONTINUED | OUTPATIENT
Start: 2024-11-03 | End: 2024-11-04 | Stop reason: HOSPADM

## 2024-11-03 RX ORDER — ASPIRIN 81 MG/1
325 TABLET ORAL DAILY
Status: DISCONTINUED | OUTPATIENT
Start: 2024-11-03 | End: 2024-11-03

## 2024-11-03 RX ORDER — FLUTICASONE FUROATE AND VILANTEROL 100; 25 UG/1; UG/1
1 POWDER RESPIRATORY (INHALATION) DAILY
Status: DISCONTINUED | OUTPATIENT
Start: 2024-11-03 | End: 2024-11-03

## 2024-11-03 RX ORDER — DOXAZOSIN 2 MG/1
2 TABLET ORAL 2 TIMES DAILY
Status: DISCONTINUED | OUTPATIENT
Start: 2024-11-03 | End: 2024-11-04 | Stop reason: HOSPADM

## 2024-11-03 RX ORDER — ONDANSETRON 2 MG/ML
4 INJECTION INTRAMUSCULAR; INTRAVENOUS EVERY 4 HOURS PRN
Status: DISCONTINUED | OUTPATIENT
Start: 2024-11-03 | End: 2024-11-04 | Stop reason: HOSPADM

## 2024-11-03 RX ORDER — FAMOTIDINE 20 MG/1
20 TABLET, FILM COATED ORAL 2 TIMES DAILY
Status: DISCONTINUED | OUTPATIENT
Start: 2024-11-03 | End: 2024-11-04 | Stop reason: HOSPADM

## 2024-11-03 RX ORDER — POLYETHYLENE GLYCOL 3350 17 G/17G
1 POWDER, FOR SOLUTION ORAL
Status: DISCONTINUED | OUTPATIENT
Start: 2024-11-03 | End: 2024-11-04 | Stop reason: HOSPADM

## 2024-11-03 RX ORDER — ONDANSETRON 4 MG/1
4 TABLET, ORALLY DISINTEGRATING ORAL EVERY 4 HOURS PRN
Status: DISCONTINUED | OUTPATIENT
Start: 2024-11-03 | End: 2024-11-04 | Stop reason: HOSPADM

## 2024-11-03 RX ORDER — ACETAMINOPHEN 325 MG/1
325 TABLET ORAL 2 TIMES DAILY
COMMUNITY

## 2024-11-03 RX ORDER — LORAZEPAM 2 MG/ML
1 INJECTION INTRAMUSCULAR
Status: DISCONTINUED | OUTPATIENT
Start: 2024-11-03 | End: 2024-11-04 | Stop reason: HOSPADM

## 2024-11-03 RX ORDER — SIMVASTATIN 20 MG
10 TABLET ORAL EVERY EVENING
Status: DISCONTINUED | OUTPATIENT
Start: 2024-11-03 | End: 2024-11-04 | Stop reason: HOSPADM

## 2024-11-03 RX ORDER — NICOTINE 21 MG/24HR
1 PATCH, TRANSDERMAL 24 HOURS TRANSDERMAL EVERY 24 HOURS
Status: DISCONTINUED | OUTPATIENT
Start: 2024-11-03 | End: 2024-11-04 | Stop reason: HOSPADM

## 2024-11-03 RX ORDER — ACETAMINOPHEN 325 MG/1
650 TABLET ORAL EVERY 6 HOURS PRN
Status: DISCONTINUED | OUTPATIENT
Start: 2024-11-03 | End: 2024-11-04 | Stop reason: HOSPADM

## 2024-11-03 RX ORDER — ALBUTEROL SULFATE 90 UG/1
2 INHALANT RESPIRATORY (INHALATION)
Status: DISCONTINUED | OUTPATIENT
Start: 2024-11-03 | End: 2024-11-04 | Stop reason: HOSPADM

## 2024-11-03 RX ORDER — ENOXAPARIN SODIUM 100 MG/ML
40 INJECTION SUBCUTANEOUS DAILY
Status: DISCONTINUED | OUTPATIENT
Start: 2024-11-03 | End: 2024-11-04 | Stop reason: HOSPADM

## 2024-11-03 RX ORDER — MONTELUKAST SODIUM 10 MG/1
10 TABLET ORAL DAILY
Status: DISCONTINUED | OUTPATIENT
Start: 2024-11-03 | End: 2024-11-04 | Stop reason: HOSPADM

## 2024-11-03 RX ORDER — LORAZEPAM 2 MG/ML
4 INJECTION INTRAMUSCULAR
Status: DISCONTINUED | OUTPATIENT
Start: 2024-11-03 | End: 2024-11-04 | Stop reason: HOSPADM

## 2024-11-03 RX ORDER — NICOTINE 21 MG/24HR
1 PATCH, TRANSDERMAL 24 HOURS TRANSDERMAL EVERY 24 HOURS
Status: ON HOLD | COMMUNITY
End: 2024-11-04

## 2024-11-03 RX ADMIN — MONTELUKAST 10 MG: 10 TABLET, FILM COATED ORAL at 09:43

## 2024-11-03 RX ADMIN — MOMETASONE FUROATE AND FORMOTEROL FUMARATE DIHYDRATE 2 PUFF: 200; 5 AEROSOL RESPIRATORY (INHALATION) at 09:47

## 2024-11-03 RX ADMIN — ENOXAPARIN SODIUM 40 MG: 100 INJECTION SUBCUTANEOUS at 17:48

## 2024-11-03 RX ADMIN — ACETAMINOPHEN 650 MG: 325 TABLET ORAL at 16:24

## 2024-11-03 RX ADMIN — DOXAZOSIN 2 MG: 2 TABLET ORAL at 09:42

## 2024-11-03 RX ADMIN — DOXAZOSIN 2 MG: 2 TABLET ORAL at 16:27

## 2024-11-03 RX ADMIN — MOMETASONE FUROATE AND FORMOTEROL FUMARATE DIHYDRATE 2 PUFF: 200; 5 AEROSOL RESPIRATORY (INHALATION) at 18:36

## 2024-11-03 RX ADMIN — NICOTINE 14 MG: 14 PATCH TRANSDERMAL at 16:26

## 2024-11-03 RX ADMIN — FAMOTIDINE 20 MG: 20 TABLET, FILM COATED ORAL at 09:42

## 2024-11-03 RX ADMIN — IOHEXOL 80 ML: 350 INJECTION, SOLUTION INTRAVENOUS at 05:15

## 2024-11-03 RX ADMIN — TIOTROPIUM BROMIDE INHALATION SPRAY 5 MCG: 3.12 SPRAY, METERED RESPIRATORY (INHALATION) at 09:49

## 2024-11-03 RX ADMIN — SIMVASTATIN 10 MG: 20 TABLET, FILM COATED ORAL at 17:48

## 2024-11-03 RX ADMIN — FAMOTIDINE 20 MG: 20 TABLET, FILM COATED ORAL at 17:48

## 2024-11-03 SDOH — ECONOMIC STABILITY: TRANSPORTATION INSECURITY
IN THE PAST 12 MONTHS, HAS LACK OF RELIABLE TRANSPORTATION KEPT YOU FROM MEDICAL APPOINTMENTS, MEETINGS, WORK OR FROM GETTING THINGS NEEDED FOR DAILY LIVING?: NO

## 2024-11-03 ASSESSMENT — COGNITIVE AND FUNCTIONAL STATUS - GENERAL
SUGGESTED CMS G CODE MODIFIER DAILY ACTIVITY: CH
MOBILITY SCORE: 24
SUGGESTED CMS G CODE MODIFIER MOBILITY: CH
DAILY ACTIVITIY SCORE: 24

## 2024-11-03 ASSESSMENT — LIFESTYLE VARIABLES
HAVE PEOPLE ANNOYED YOU BY CRITICIZING YOUR DRINKING: NO
HAVE YOU EVER FELT YOU SHOULD CUT DOWN ON YOUR DRINKING: NO
AVERAGE NUMBER OF DAYS PER WEEK YOU HAVE A DRINK CONTAINING ALCOHOL: 0
ON A TYPICAL DAY WHEN YOU DRINK ALCOHOL HOW MANY DRINKS DO YOU HAVE: 0
TOTAL SCORE: 0
HOW MANY TIMES IN THE PAST YEAR HAVE YOU HAD 5 OR MORE DRINKS IN A DAY: 0
TOTAL SCORE: 0
ALCOHOL_USE: NO
CONSUMPTION TOTAL: NEGATIVE
TOTAL SCORE: 0
EVER HAD A DRINK FIRST THING IN THE MORNING TO STEADY YOUR NERVES TO GET RID OF A HANGOVER: NO
EVER FELT BAD OR GUILTY ABOUT YOUR DRINKING: NO

## 2024-11-03 ASSESSMENT — ENCOUNTER SYMPTOMS
MYALGIAS: 0
VOMITING: 0
NAUSEA: 0
CHILLS: 0
HEARTBURN: 0
FOCAL WEAKNESS: 0
WHEEZING: 0
SHORTNESS OF BREATH: 0
DIARRHEA: 0
SPEECH CHANGE: 1
TINGLING: 0
SEIZURES: 1
TREMORS: 0
FEVER: 0
COUGH: 0
DIZZINESS: 0
HEADACHES: 0
LOSS OF CONSCIOUSNESS: 1
PALPITATIONS: 0
WEAKNESS: 0
ABDOMINAL PAIN: 0

## 2024-11-03 ASSESSMENT — SOCIAL DETERMINANTS OF HEALTH (SDOH)
IN THE PAST 12 MONTHS, HAS THE ELECTRIC, GAS, OIL, OR WATER COMPANY THREATENED TO SHUT OFF SERVICE IN YOUR HOME?: NO
WITHIN THE LAST YEAR, HAVE YOU BEEN HUMILIATED OR EMOTIONALLY ABUSED IN OTHER WAYS BY YOUR PARTNER OR EX-PARTNER?: NO
WITHIN THE LAST YEAR, HAVE YOU BEEN KICKED, HIT, SLAPPED, OR OTHERWISE PHYSICALLY HURT BY YOUR PARTNER OR EX-PARTNER?: NO
WITHIN THE LAST YEAR, HAVE YOU BEEN AFRAID OF YOUR PARTNER OR EX-PARTNER?: NO
WITHIN THE PAST 12 MONTHS, YOU WORRIED THAT YOUR FOOD WOULD RUN OUT BEFORE YOU GOT THE MONEY TO BUY MORE: NEVER TRUE
WITHIN THE PAST 12 MONTHS, THE FOOD YOU BOUGHT JUST DIDN'T LAST AND YOU DIDN'T HAVE MONEY TO GET MORE: NEVER TRUE
WITHIN THE LAST YEAR, HAVE TO BEEN RAPED OR FORCED TO HAVE ANY KIND OF SEXUAL ACTIVITY BY YOUR PARTNER OR EX-PARTNER?: NO

## 2024-11-03 ASSESSMENT — FIBROSIS 4 INDEX
FIB4 SCORE: 1.78
FIB4 SCORE: 2.08

## 2024-11-03 ASSESSMENT — PATIENT HEALTH QUESTIONNAIRE - PHQ9
2. FEELING DOWN, DEPRESSED, IRRITABLE, OR HOPELESS: NOT AT ALL
1. LITTLE INTEREST OR PLEASURE IN DOING THINGS: NOT AT ALL
SUM OF ALL RESPONSES TO PHQ9 QUESTIONS 1 AND 2: 0

## 2024-11-03 ASSESSMENT — PAIN DESCRIPTION - PAIN TYPE: TYPE: ACUTE PAIN

## 2024-11-03 NOTE — ASSESSMENT & PLAN NOTE
Patient smokes 0.5 packs/day smoking since patient was 20 years old, previously at 1 and half packs per day.  Nicotine patch and/or gum offered patient is currently on nicotine patch.   I discussed cessation with patient including starting on nicotine patch and/or gum on discharge.  I also discussed medications to help with cessation with patient including Wellbutrin and Chantix, with side effects to Chantix and prefers to use the nicotine patch.  Smoking cessation discussed with patient for 4 minutes.

## 2024-11-03 NOTE — HOSPITAL COURSE
Patient is a 79-year-old male with past medical history of tobacco use and potential COPD without obstruction on PFT in 2018 who presents due to seizure-like activity with associated confusion and profuse shaking noted by the wife during sleep. MRI brain negative for acute process.   CTA neck demonstrated 80% proximal right ICA stenosis. Given MRI findings negative for stroke and patient has no clinical history of TIA suspect this stenosis to be asymptomatic. I sent aspirin and high intensity statin to patients pharmacy after his discharge med rec was completed. Patients drug screen found to be positive for methadone. Patient is not prescribed methadone but admits to taking some that is prescribed to his wife. Patient has no metabolic derangements or infections to explain his seizure like activity. Possible related to his recreational use of methadone to which patient ensures he will stop. Patient is requesting discharge today. Patient was determined satisfactory for discharge with appropriate follow up.

## 2024-11-03 NOTE — ED NOTES
Med Rec complete per patient and spouse at bedside   Allergies reviewed  Antibiotics in the past 30 days:no  Anticoagulant in past 14 days:no  Pharmacy patient utilizes:[facility] on [address]    Pt states he applies the Nicotine 21 mg patch; currently has patch on.     Pt states he's been using his Breo and Spiriva BID

## 2024-11-03 NOTE — PROGRESS NOTES
Received report on pt. Went down to the ER to take him to room. Tele box placed; monitor room called.

## 2024-11-03 NOTE — ASSESSMENT & PLAN NOTE
Witnessed generalized tonic-clonic event by the wife during patient's sleep less than approximately 1 minute without urine incontinence.  Patient does not remember event and last memory is awakening in the ambulance.  Patient had confusion throughout the night that slowly returned consistent with likely postictal state  - Neurochecks every 4 hours  - MRI of the brain, video EEG ordered  - Urinalysis/CPK ordered  - Ativan as needed for seizure-like activity

## 2024-11-03 NOTE — ED PROVIDER NOTES
ED Provider Note    CHIEF COMPLAINT  Chief Complaint   Patient presents with    ALOC     Patient is a 79 y.o male Pt brought in by EMS [facility] .c/o As per wife Pt had an episode of tremors and became lethargic and unable to answer Her question . Upon arrival Pt denies chest pain and no SOB.   Pt AO&OX4, Respiration wnl, Ambulatory. Will continuing Monitoring .         EXTERNAL RECORDS REVIEWED  Outpatient Notes outpatient office visit 9/19/2024.  Patient followed for COPD with chronic history of smoking    HPI/ROS  LIMITATION TO HISTORY   Select: Altered mental status / Confusion  OUTSIDE HISTORIAN(S):  None    Josh Pang is a 79 y.o. male who presents to the Emergency Department with altered mental status.  At this point patient states that he is having difficulty finding some words.  He is amnestic to the event.  Reported to EMS called to residence after wife felt that the patient may have had some tremor-like activity and was then altered compared to his baseline.  Patient can only recall waking up in the back of the ambulance at this point.  Other than the word finding difficulty he has no other current complaints.  Denies any chest pain or shortness of breath.  No extremity weakness or numbness or tingling.    PAST MEDICAL HISTORY   has a past medical history of CATARACT, GERD (gastroesophageal reflux disease), Heart murmur, Hepatitis C, IVDU (intravenous drug user) (8/18/2009), Migraine, Psychiatric disorder, and Ulcer.    SURGICAL HISTORY   has a past surgical history that includes open reduction; cholecystectomy; other; and other.    FAMILY HISTORY  Family History   Problem Relation Age of Onset    Diabetes Mother     Diabetes Father     Cancer Neg Hx        SOCIAL HISTORY  Social History     Tobacco Use    Smoking status: Every Day     Current packs/day: 0.75     Average packs/day: 0.8 packs/day for 58.8 years (44.1 ttl pk-yrs)     Types: Cigarettes     Start date: 1966    Smokeless tobacco: Never  "  Vaping Use    Vaping status: Never Used   Substance and Sexual Activity    Alcohol use: No     Comment: drinks ETOH occasionally    Drug use: No    Sexual activity: Not on file       CURRENT MEDICATIONS  Home Medications    **Home medications have not yet been reviewed for this encounter**       Audit from Redirected Encounters    **Home medications have not yet been reviewed for this encounter**         ALLERGIES  Allergies   Allergen Reactions    Azithromycin      rash       PHYSICAL EXAM  VITAL SIGNS: /59   Pulse 82   Temp 36.2 °C (97.2 °F)   Resp 13   Ht 1.798 m (5' 10.8\")   Wt 95.3 kg (210 lb)   SpO2 96%   BMI 29.45 kg/m²      Pulse ox interpretation: I interpret this pulse ox as normal.  Constitutional: Alert in no apparent distress.  HENT: No signs of trauma, Bilateral external ears normal, Nose normal.   Eyes: Pupils are equal and reactive  Neck: Normal range of motion, No tenderness, Supple  Cardiovascular: Regular rate and rhythm, no murmurs.   Thorax & Lungs: Normal breath sounds, No respiratory distress  Skin: Warm, Dry, No erythema, No rash.   Musculoskeletal: Good range of motion in all major joints. No tenderness to palpation or major deformities noted.   Neurologic: Alert , Normal motor function, Normal sensory function, No focal deficits noted.   Psychiatric: Affect normal, Judgment normal, Mood normal.         EKG/LABS  Results for orders placed or performed during the hospital encounter of 24   EKG    Collection Time: 24  4:15 AM   Result Value Ref Range    Report       [facility] Emergency Dept.    Test Date:  2024  Pt Name:    KELLY AVILA                 Department: ER  MRN:        [id number]                      Room:        10  Gender:     Male                         Technician: [id number]  :        1945                   Requested By:ALPESH FLYNN  Order #:    [id number]                    Reading MD:    Measurements  Intervals         "                        Axis  Rate:       80                           P:          46  HI:         157                          QRS:        12  QRSD:       92                           T:          29  QT:         400  QTc:        462    Interpretive Statements  Sinus rhythm  RSR' in V1 or V2, probably normal variant  No previous ECG available for comparison         I have independently interpreted this EKG    RADIOLOGY/PROCEDURES   I have independently interpreted the diagnostic imaging associated with this visit and am waiting the final reading from the radiologist.   My preliminary interpretation is as follows: No acute consolidative process on chest x-ray    Radiologist interpretation:  DX-CHEST-PORTABLE (1 VIEW)   Final Result      No acute process.      CT-CTA NECK WITH & W/O-POST PROCESSING    (Results Pending)   CT-CTA HEAD WITH & W/O-POST PROCESS    (Results Pending)       COURSE & MEDICAL DECISION MAKING    ASSESSMENT, COURSE AND PLAN  Care Narrative: 79-year-old presented the ER with above presentation.  Exam currently benign.  He is slow to provide some answers but ultimately gets answer correct.  Will get labs and CT imaging as well as EKG    DISPOSITION AND DISCUSSIONS  79-year-old presenting to the emerged part with above presentation.  I have completed initial ER triage and initiated workup as stated above.  Broad differential remains.  Will see what tox metabolic and neurologic evaluations lead to.  Will have my colleague that I have signed out to continue to follow-up on the entire workup and continue to disposition as clinically indicated    FINAL DIAGNOSIS  Altered mental status     Electronically signed by: William Corley M.D., 11/3/2024 3:33 AM

## 2024-11-03 NOTE — PROGRESS NOTES
Hospital Medicine Daily Progress Note    Date of Service  11/3/2024    Chief Complaint  Josh Pang is a 79 y.o. male admitted 11/3/2024 with altered level of consciousness.     Hospital Course  Patient is a 79-year-old male with past medical history of tobacco use and potential COPD without obstruction on PFT in 2018 who presents due to seizure-like activity with associated confusion and profuse shaking noted by the wife during sleep. Patient began to shake, been shaking so much and woke his wife up who could not speak or cancer to the patient due to shaking episode that lasted for about 1 minute. Denies any urine incontinence or fecal incontinence, however does not remember the event and wife states the patient was confused until EMS arrived with continued confusion and difficulty answering questions until my exam. Denies any fever, chills, chest pain, lightheadedness, headache, focal weakness or sensory changes, denies any seizure-like activity in the past. Denies any focal head trauma or previous traumatic brain injuries. Patient currently does not drive due to his eyesight.     Interval Problem Update  11/3 Afebrile, vitals stable and on 2L nasal canula. Labs largely unremarkable. CTA neck showing 80% stenosis of right ICA, Neuro vascular consulted, Dr Jang, who will follow MRI results. Plan of care discussed with patient and his wife, all questions answered.     I have discussed this patient's plan of care and discharge plan at IDT rounds today with Case Management, Nursing, Nursing leadership, and other members of the IDT team.    Consultants/Specialty  Neurovascular    Code Status  Full Code    Disposition  The patient is not medically cleared for discharge to home or a post-acute facility.  Anticipate discharge to: home with close outpatient follow-up    I have placed the appropriate orders for post-discharge needs.    Review of Systems  Review of Systems   Constitutional:  Negative for chills, fever  and malaise/fatigue.   Respiratory:  Negative for cough and shortness of breath.    Cardiovascular:  Negative for chest pain, palpitations and leg swelling.   Gastrointestinal:  Negative for abdominal pain, diarrhea, heartburn, nausea and vomiting.   Genitourinary:  Negative for dysuria, frequency and urgency.   Neurological:  Positive for speech change and loss of consciousness. Negative for dizziness and headaches.   All other systems reviewed and are negative.       Physical Exam  Temp:  [36.2 °C (97.2 °F)] 36.2 °C (97.2 °F)  Pulse:  [67-84] 67  Resp:  [12-15] 12  BP: (120-142)/(59-64) 139/64  SpO2:  [95 %-96 %] 96 %    Physical Exam  Vitals and nursing note reviewed.   Constitutional:       Appearance: Normal appearance. He is not ill-appearing.   HENT:      Head: Normocephalic and atraumatic.      Jaw: There is normal jaw occlusion.      Right Ear: Hearing normal.      Left Ear: Hearing normal.      Nose: Nose normal.      Mouth/Throat:      Lips: Pink.      Mouth: Mucous membranes are moist.   Eyes:      Extraocular Movements: Extraocular movements intact.      Conjunctiva/sclera: Conjunctivae normal.      Pupils: Pupils are equal, round, and reactive to light.   Neck:      Vascular: No carotid bruit.   Cardiovascular:      Rate and Rhythm: Normal rate and regular rhythm.      Pulses: Normal pulses.      Heart sounds: Normal heart sounds, S1 normal and S2 normal.   Pulmonary:      Effort: Pulmonary effort is normal.      Breath sounds: Normal breath sounds and air entry. No stridor.   Abdominal:      General: Bowel sounds are normal.      Palpations: Abdomen is soft.      Tenderness: There is no abdominal tenderness.   Musculoskeletal:      Cervical back: Normal range of motion and neck supple.      Right lower leg: No edema.      Left lower leg: No edema.   Skin:     General: Skin is warm and dry.      Capillary Refill: Capillary refill takes less than 2 seconds.   Neurological:      General: No focal deficit  present.      Mental Status: He is alert and oriented to person, place, and time. Mental status is at baseline.      Sensory: Sensation is intact.      Motor: Motor function is intact.   Psychiatric:         Attention and Perception: Attention and perception normal.         Mood and Affect: Mood and affect normal.         Speech: Speech normal.         Behavior: Behavior normal. Behavior is cooperative.         Fluids  No intake or output data in the 24 hours ending 11/03/24 0725     Laboratory  Recent Labs     11/03/24  0400   WBC 8.8   RBC 5.09   HEMOGLOBIN 16.6   HEMATOCRIT 48.2   MCV 94.7   MCH 32.6   MCHC 34.4   RDW 47.9   PLATELETCT 216   MPV 9.6     Recent Labs     11/03/24  0400   SODIUM 139   POTASSIUM 4.0   CHLORIDE 107   CO2 20   GLUCOSE 114*   BUN 24*   CREATININE 0.92   CALCIUM 8.8                   Imaging  CT-CTA NECK WITH & W/O-POST PROCESSING   Final Result      80% proximal right ICA stenosis. Otherwise, negative.      CT-CTA HEAD WITH & W/O-POST PROCESS   Final Result      No significant stenosis, acute arterial occlusion, or aneurysm identified.      DX-CHEST-PORTABLE (1 VIEW)   Final Result      No acute process.      MR-BRAIN-W/O    (Results Pending)        Assessment/Plan  * Seizure-like activity (HCC)- (present on admission)  Assessment & Plan  Witnessed generalized tonic-clonic event by the wife during patient's sleep less than approximately 1 minute without urine incontinence.  Patient does not remember event and last memory is awakening in the ambulance.  Patient had confusion throughout the night that slowly returned consistent with likely postictal state  - Neurochecks every 4 hours  - MRI of the brain, video EEG ordered  - Urinalysis/CPK ordered  - Ativan as needed for seizure-like activity    Carotid stenosis  Assessment & Plan  - CT reveals 80% stenosis of right ICA  - Neurovascular consulted, Dr Jang, who will follow MRI results.    COPD (chronic obstructive pulmonary disease)  (HCC)- (present on admission)  Assessment & Plan  Last PFTs within normal limits in 2018, counseled patient on potentially repeating PFTs  - Will continue home Breo and Spiriva    Tobacco use- (present on admission)  Assessment & Plan  Patient smokes 0.5 packs/day smoking since patient was 20 years old, previously at 1 and half packs per day.  Nicotine patch and/or gum offered patient is currently on nicotine patch.   I discussed cessation with patient including starting on nicotine patch and/or gum on discharge.  I also discussed medications to help with cessation with patient including Wellbutrin and Chantix, with side effects to Chantix and prefers to use the nicotine patch.  Smoking cessation discussed with patient for 4 minutes.    Hyperlipidemia- (present on admission)  Assessment & Plan  Continue home simvastatin         VTE prophylaxis:   SCDs/TEDs   enoxaparin ppx      I have performed a physical exam and reviewed and updated ROS and Plan today (11/3/2024). In review of yesterday's note (11/2/2024), there are no changes except as documented above.

## 2024-11-03 NOTE — PROGRESS NOTES
"ED Observation Progress Note    Date of Service: 11/03/24    Interval History and Interventions  Patient is received in signout from Dr. Corley pending CTAs.  Briefly this is a 79-year-old male presenting with some confusion and word finding difficulty.  He was normal when he went to bed and then woke up around 2 AM and per his wife he did have some shaking type of activity in his lower extremities at that time and this lasted for around 5 to 10 minutes.     5:46 AM  Patient is reevaluated, he is returned to his baseline.  Discussed results plan for hospitalization  And discussed case with hospitalist for admission      Physical Exam  BP (!) 142/64   Pulse 84   Temp 36.2 °C (97.2 °F)   Resp 13   Ht 1.798 m (5' 10.8\")   Wt 95.3 kg (210 lb)   SpO2 95%   BMI 29.45 kg/m² .    Constitutional: Awake and alert. Nontoxic  HENT:  Grossly normal  Eyes: Grossly normal  Neck: Normal range of motion  Cardiovascular: Normal heart rate   Thorax & Lungs: No respiratory distress  Abdomen: Nontender  Skin:  No pathologic rash.   Extremities: Well perfused  Psychiatric: Affect normal    Labs  Results for orders placed or performed during the hospital encounter of 11/03/24   CBC WITH DIFFERENTIAL    Collection Time: 11/03/24  4:00 AM   Result Value Ref Range    WBC 8.8 4.8 - 10.8 K/uL    RBC 5.09 4.70 - 6.10 M/uL    Hemoglobin 16.6 14.0 - 18.0 g/dL    Hematocrit 48.2 42.0 - 52.0 %    MCV 94.7 81.4 - 97.8 fL    MCH 32.6 27.0 - 33.0 pg    MCHC 34.4 32.3 - 36.5 g/dL    RDW 47.9 35.9 - 50.0 fL    Platelet Count 216 164 - 446 K/uL    MPV 9.6 9.0 - 12.9 fL    Neutrophils-Polys 76.10 (H) 44.00 - 72.00 %    Lymphocytes 12.80 (L) 22.00 - 41.00 %    Monocytes 6.00 0.00 - 13.40 %    Eosinophils 2.60 0.00 - 6.90 %    Basophils 1.70 0.00 - 1.80 %    Nucleated RBC 0.00 0.00 - 0.20 /100 WBC    Neutrophils (Absolute) 6.70 1.82 - 7.42 K/uL    Lymphs (Absolute) 1.13 1.00 - 4.80 K/uL    Monos (Absolute) 0.53 0.00 - 0.85 K/uL    Eos (Absolute) " 0.23 0.00 - 0.51 K/uL    Baso (Absolute) 0.15 (H) 0.00 - 0.12 K/uL    NRBC (Absolute) 0.00 K/uL   COMP METABOLIC PANEL    Collection Time: 24  4:00 AM   Result Value Ref Range    Sodium 139 135 - 145 mmol/L    Potassium 4.0 3.6 - 5.5 mmol/L    Chloride 107 96 - 112 mmol/L    Co2 20 20 - 33 mmol/L    Anion Gap 12.0 7.0 - 16.0    Glucose 114 (H) 65 - 99 mg/dL    Bun 24 (H) 8 - 22 mg/dL    Creatinine 0.92 0.50 - 1.40 mg/dL    Calcium 8.8 8.5 - 10.5 mg/dL    Correct Calcium 8.8 8.5 - 10.5 mg/dL    AST(SGOT) 18 12 - 45 U/L    ALT(SGPT) 10 2 - 50 U/L    Alkaline Phosphatase 52 30 - 99 U/L    Total Bilirubin 0.5 0.1 - 1.5 mg/dL    Albumin 4.0 3.2 - 4.9 g/dL    Total Protein 7.1 6.0 - 8.2 g/dL    Globulin 3.1 1.9 - 3.5 g/dL    A-G Ratio 1.3 g/dL   TROPONIN    Collection Time: 24  4:00 AM   Result Value Ref Range    Troponin T 16 6 - 19 ng/L   ESTIMATED GFR    Collection Time: 24  4:00 AM   Result Value Ref Range    GFR (CKD-EPI) 85 >60 mL/min/1.73 m 2   DIFFERENTIAL MANUAL    Collection Time: 24  4:00 AM   Result Value Ref Range    Myelocytes 0.80 %    Manual Diff Status PERFORMED    PERIPHERAL SMEAR REVIEW    Collection Time: 24  4:00 AM   Result Value Ref Range    Peripheral Smear Review see below    PLATELET ESTIMATE    Collection Time: 24  4:00 AM   Result Value Ref Range    Plt Estimation Normal    MORPHOLOGY    Collection Time: 24  4:00 AM   Result Value Ref Range    RBC Morphology Normal    EKG    Collection Time: 24  4:15 AM   Result Value Ref Range    Report       [facility]    Test Date:  2024  Pt Name:    KELLY AVILA                 Department: ER  MRN:        [id number]                      Room:        10  Gender:     Male                         Technician: [id number]  :        1945                   Requested By:WILLIAM FLYNN  Order #:    [id number]                    [place] ART: William  Barney    Measurements  Intervals                                Axis  Rate:       80                           P:          46  NM:         157                          QRS:        12  QRSD:       92                           T:          29  QT:         400  QTc:        462    Interpretive Statements  Sinus rhythm  RSR' in V1 or V2, probably normal variant  No previous ECG available for comparison  Electronically Signed On 11- 04:35:07 PST by William Corley         Radiology  CT-CTA NECK WITH & W/O-POST PROCESSING   Final Result      80% proximal right ICA stenosis. Otherwise, negative.      CT-CTA HEAD WITH & W/O-POST PROCESS   Final Result      No significant stenosis, acute arterial occlusion, or aneurysm identified.      DX-CHEST-PORTABLE (1 VIEW)   Final Result      No acute process.          Problem List  1.  Seizure-like activity  2.  Word-finding difficulty    Electronically signed by: Chang Beltran M.D., 11/3/2024 4:52 AM

## 2024-11-03 NOTE — ASSESSMENT & PLAN NOTE
Last PFTs within normal limits in 2018, counseled patient on potentially repeating PFTs  - Will continue home Breo and Spiriva

## 2024-11-03 NOTE — PROGRESS NOTES
4 Eyes Skin Assessment Completed by LARON LAWSON and LARON Khan.    Skin intact; nothing noted worthy of taking a photograph of.     Head WDL  Ears WDL  Nose WDL  Mouth WDL  Neck WDL  Breast/Chest WDL  Shoulder Blades WDL  Spine WDL  (R) Arm/Elbow/Hand Discoloration, old scarred areas, no open areas  (L) Arm/Elbow/Hand Discoloration, old scarred areas, no open areas  Abdomen WDL  Groin WDL  Scrotum/Coccyx/Buttocks WDL  (R) Leg WDL  (L) Leg WDL  (R) Heel/Foot/Toe WDL - skin dry, lotion applied   (L) Heel/Foot/Toe WDL - skin dry lotion applied           Devices In Places Pulse Ox,  sticker      Interventions In Place Pressure Redistribution Mattress    Possible Skin Injury No    Pictures Uploaded Into Epic N/A  Wound Consult Placed N/A  RN Wound Prevention Protocol Ordered No

## 2024-11-03 NOTE — PROGRESS NOTES
I will be off duty and signed out of voalte at 3pm.  If you have any needs for this patient after 3pm, please reach out to the on call Leann MARIE.  Thank you!

## 2024-11-03 NOTE — H&P
Hospital Medicine History & Physical Note    Date of Service  11/3/2024    Primary Care Physician  Oren Pisano M.D.      Code Status  Full Code    Chief Complaint  Chief Complaint   Patient presents with    ALOC     Patient is a 79 y.o male Pt brought in by EMS [facility] .c/o As per wife Pt had an episode of tremors and became lethargic and unable to answer Her question . Upon arrival Pt denies chest pain and no SOB.   Pt AO&OX4, Respiration wnl, Ambulatory. Will continuing Monitoring .         History of Presenting Illness  Patient is a 79-year-old male with past medical history of tobacco use and potential COPD without obstruction on PFT in 2018 who presents due to seizure-like activity with associated confusion and profuse shaking noted by the wife during sleep.  Patient began to shake, been shaking so much and woke his wife up who could not speak or cancer to the patient due to shaking episode that lasted for about 1 minute.  Denies any urine incontinence or fecal incontinence, however does not remember the event and wife states the patient was confused until EMS arrived with continued confusion and difficulty answering questions until my exam.  Denies any fever, chills, chest pain, lightheadedness, headache, focal weakness or sensory changes, denies any seizure-like activity in the past.  Denies any focal head trauma or previous traumatic brain injuries.  Patient currently does not drive due to his eyesight.    I discussed the plan of care with patient.    Review of Systems  Review of Systems   Constitutional:  Negative for chills and fever.   Respiratory:  Negative for cough, shortness of breath and wheezing.    Cardiovascular:  Negative for chest pain, palpitations and leg swelling.   Genitourinary:  Negative for dysuria.   Musculoskeletal:  Negative for myalgias.   Neurological:  Positive for seizures and loss of consciousness. Negative for dizziness, tingling, tremors, focal weakness, weakness and  headaches.       Past Medical History   has a past medical history of CATARACT, GERD (gastroesophageal reflux disease), Heart murmur, Hepatitis C, IVDU (intravenous drug user) (8/18/2009), Migraine, Psychiatric disorder, and Ulcer.    Surgical History   has a past surgical history that includes open reduction; cholecystectomy; other; and other.     Family History  family history includes Diabetes in his father and mother.   Family history reviewed with patient. There is no family history that is pertinent to the chief complaint.     Social History   reports that he has been smoking cigarettes. He started smoking about 58 years ago. He has a 44.1 pack-year smoking history. He has never used smokeless tobacco. He reports that he does not drink alcohol and does not use drugs.    Allergies  Allergies   Allergen Reactions    Azithromycin      rash       Medications  Prior to Admission Medications   Prescriptions Last Dose Informant Patient Reported? Taking?   Azelastine (ASTELIN) 137 MCG/SPRAY Solution   No No   Sig: Administer 1 Spray into affected nostril(S) every day.   albuterol 108 (90 Base) MCG/ACT Aero Soln inhalation aerosol   No No   Sig: Inhale 2 Puffs every 6 hours as needed for Shortness of Breath.   aspirin EC (ECOTRIN) 81 MG Tablet Delayed Response   Yes No   Sig: Take 4 Tablets by mouth every day.   celecoxib (CELEBREX) 200 MG Cap   Yes No   Sig: Take 200 mg by mouth 1 time a day as needed for Moderate Pain. For arthritis, per [facility]   doxazosin (CARDURA) 2 MG Tab   No No   Sig: Take 1 Tablet by mouth 2 times a day.   famotidine (PEPCID) 20 MG Tab   Yes No   Sig: Take 20 mg by mouth 2 times a day.   fluticasone (FLONASE) 50 MCG/ACT nasal spray   Yes No   Sig: Administer 1 Spray into affected nostril(S) every day.   fluticasone furoate-vilanterol (BREO ELLIPTA) 100-25 MCG/ACT AEROSOL POWDER, BREATH ACTIVATED   No No   Sig: Inhale 1 Puff every day.   montelukast (SINGULAIR) 10 MG Tab   No No   Sig:  Take 1 Tablet by mouth every day.   nicotine (NICODERM) 14 MG/24HR PATCH 24 HR   No No   Sig: Place 1 Patch on the skin every 24 hours.   simvastatin (ZOCOR) 10 MG Tab   No No   Sig: Take 1 Tablet by mouth every evening.   tiotropium (SPIRIVA HANDIHALER) 18 MCG Cap   No No   Sig: Place 1 Capsule into inhaler and inhale every day.      Facility-Administered Medications: None       Physical Exam  Temp:  [36.2 °C (97.2 °F)] 36.2 °C (97.2 °F)  Pulse:  [79-84] 84  Resp:  [13-15] 13  BP: (120-142)/(59-64) 142/64  SpO2:  [95 %-96 %] 95 %  Blood Pressure : (!) 142/64   Temperature: 36.2 °C (97.2 °F)   Pulse: 84   Respiration: 13   Pulse Oximetry: 95 %       Physical Exam  Vitals and nursing note reviewed.   Constitutional:       General: He is not in acute distress.  HENT:      Head: Normocephalic and atraumatic.      Mouth/Throat:      Mouth: Mucous membranes are moist.   Eyes:      General: No scleral icterus.     Extraocular Movements: Extraocular movements intact.   Cardiovascular:      Rate and Rhythm: Normal rate and regular rhythm.      Heart sounds: No murmur heard.     No gallop.   Pulmonary:      Effort: Pulmonary effort is normal. No respiratory distress.      Breath sounds: No wheezing, rhonchi or rales.   Abdominal:      General: Abdomen is flat. Bowel sounds are normal. There is no distension.      Palpations: Abdomen is soft.      Tenderness: There is no abdominal tenderness.   Musculoskeletal:         General: No swelling or tenderness.      Cervical back: Neck supple.   Skin:     General: Skin is warm.   Neurological:      General: No focal deficit present.      Mental Status: He is alert and oriented to person, place, and time.      Cranial Nerves: Cranial nerves 2-12 are intact.      Motor: Motor function is intact.   Psychiatric:         Mood and Affect: Mood normal.         Laboratory:  Recent Labs     11/03/24  0400   WBC 8.8   RBC 5.09   HEMOGLOBIN 16.6   HEMATOCRIT 48.2   MCV 94.7   MCH 32.6   MCHC  "34.4   RDW 47.9   PLATELETCT 216   MPV 9.6     Recent Labs     11/03/24 0400   SODIUM 139   POTASSIUM 4.0   CHLORIDE 107   CO2 20   GLUCOSE 114*   BUN 24*   CREATININE 0.92   CALCIUM 8.8     Recent Labs     11/03/24 0400   ALTSGPT 10   ASTSGOT 18   ALKPHOSPHAT 52   TBILIRUBIN 0.5   GLUCOSE 114*         No results for input(s): \"NTPROBNP\" in the last 72 hours.      Recent Labs     11/03/24 0400   TROPONINT 16       Imaging:  CT-CTA NECK WITH & W/O-POST PROCESSING   Final Result      80% proximal right ICA stenosis. Otherwise, negative.      CT-CTA HEAD WITH & W/O-POST PROCESS   Final Result      No significant stenosis, acute arterial occlusion, or aneurysm identified.      DX-CHEST-PORTABLE (1 VIEW)   Final Result      No acute process.      MR-BRAIN-W/O    (Results Pending)       EKG:  I have personally reviewed the images and compared with prior images.    Assessment/Plan:  Justification for Admission Status  I anticipate this patient will require at least two midnights for appropriate medical management, necessitating inpatient admission because seizure-like activity    Patient will need a Telemetry bed on NEUROLOGY service .  The need is secondary to seizure-like activity.    * Seizure-like activity (HCC)- (present on admission)  Assessment & Plan  Witnessed generalized tonic-clonic event by the wife during patient's sleep less than approximately 1 minute without urine incontinence.  Patient does not remember event and last memory is awakening in the ambulance.  Patient had confusion throughout the night that slowly returned consistent with likely postictal state  - Neurochecks every 4 hours  - MRI of the brain, video EEG ordered  - Urinalysis/CPK ordered  - Ativan as needed for seizure-like activity    Tobacco use- (present on admission)  Assessment & Plan  Patient smokes 0.5 packs/day smoking since patient was 20 years old, previously at 1 and half packs per day.  Nicotine patch and/or gum offered patient " is currently on nicotine patch.   I discussed cessation with patient including starting on nicotine patch and/or gum on discharge.  I also discussed medications to help with cessation with patient including Wellbutrin and Chantix, with side effects to Chantix and prefers to use the nicotine patch.  Smoking cessation discussed with patient for 4 minutes.    COPD (chronic obstructive pulmonary disease) (HCC)- (present on admission)  Assessment & Plan  Last PFTs within normal limits in 2018, counseled patient on potentially repeating PFTs  - Will continue home Breo and Spiriva    Hyperlipidemia- (present on admission)  Assessment & Plan  Continue home simvastatin        VTE prophylaxis: enoxaparin ppx

## 2024-11-03 NOTE — ED TRIAGE NOTES
Chief Complaint   Patient presents with    ALOC     Patient is a 79 y.o male Pt brought in by EMS [facility] .c/o As per wife Pt had an episode of tremors and became lethargic and unable to answer Her question . Upon arrival Pt denies chest pain and no SOB.   Pt AO&OX4, Respiration wnl, Ambulatory. Will continuing Monitoring .

## 2024-11-03 NOTE — ASSESSMENT & PLAN NOTE
- CT reveals 80% stenosis of right ICA  - Neurovascular consulted, Dr Jang, who will follow MRI results.

## 2024-11-03 NOTE — PROCEDURES
VIDEO ELECTROENCEPHALOGRAM REPORT      Referring provider: Dr. Haywood     DOS: 11/03/24 (total recording of 23 minutes).     INDICATION:  Josh Pang 79 y.o. male presenting with episode of LOC    CURRENT ANTIEPILEPTIC REGIMEN: none     TECHNIQUE: 30 channel video electroencephalogram (EEG) was performed in accordance with the international 10-20 system. The study was reviewed in bipolar and referential montages. The recording examined the patient during   awake and drowsy states    DESCRIPTION OF THE RECORD:  During the wakefulness, the background showed a symmetrical 8 Hz activity posteriorly with amplitude of 70 mV.  There was reactivity to eye closure/opening.  A normal anterior-posterior gradient was noted with faster beta frequencies seen anteriorly.  During drowsiness, increased theta/beta frequencies were seen.    ACTIVATION PROCEDURES:     hyperventilation was not performed    Intermittent Photic stimulation was performed in a stepwise fashion from 1 to 30 Hz and elicited no photic driving response.     ICTAL AND/OR INTERICTAL FINDINGS:   No focal or generalized epileptiform activity noted. No regional slowing was seen during this routine study.  No clinical events or seizures were reported or recorded during the study.     EKG: sampling of the EKG recording demonstrated sinus rhythm.     EVENTS: none     INTERPRETATION:    This is a normal video EEG recording in the awake and drowsy states for patient's age group.     Note: A normal EEG does not rule out epilepsy..Clinical correlation is recommended.       Lorenzo Emmanuel MD  Diplomate in Neurology&Epilepsy  Office: [phone]  Fax: [phone]

## 2024-11-04 VITALS
TEMPERATURE: 99 F | HEIGHT: 69 IN | WEIGHT: 197.31 LBS | HEART RATE: 70 BPM | RESPIRATION RATE: 18 BRPM | SYSTOLIC BLOOD PRESSURE: 115 MMHG | OXYGEN SATURATION: 90 % | DIASTOLIC BLOOD PRESSURE: 58 MMHG | BODY MASS INDEX: 29.22 KG/M2

## 2024-11-04 PROBLEM — F11.90 METHADONE USE: Status: ACTIVE | Noted: 2024-11-04

## 2024-11-04 PROBLEM — M43.6 TORTICOLLIS: Status: ACTIVE | Noted: 2024-11-04

## 2024-11-04 LAB
ALBUMIN SERPL BCP-MCNC: 3.7 G/DL (ref 3.2–4.9)
ALBUMIN/GLOB SERPL: 1.4 G/DL
ALP SERPL-CCNC: 46 U/L (ref 30–99)
ALT SERPL-CCNC: 10 U/L (ref 2–50)
ANION GAP SERPL CALC-SCNC: 12 MMOL/L (ref 7–16)
AST SERPL-CCNC: 17 U/L (ref 12–45)
BASOPHILS # BLD AUTO: 1 % (ref 0–1.8)
BASOPHILS # BLD: 0.09 K/UL (ref 0–0.12)
BILIRUB SERPL-MCNC: 0.6 MG/DL (ref 0.1–1.5)
BUN SERPL-MCNC: 20 MG/DL (ref 8–22)
CALCIUM ALBUM COR SERPL-MCNC: 8.3 MG/DL (ref 8.5–10.5)
CALCIUM SERPL-MCNC: 8.1 MG/DL (ref 8.5–10.5)
CHLORIDE SERPL-SCNC: 108 MMOL/L (ref 96–112)
CO2 SERPL-SCNC: 20 MMOL/L (ref 20–33)
CREAT SERPL-MCNC: 0.91 MG/DL (ref 0.5–1.4)
EKG IMPRESSION: NORMAL
EOSINOPHIL # BLD AUTO: 0.09 K/UL (ref 0–0.51)
EOSINOPHIL NFR BLD: 1 % (ref 0–6.9)
ERYTHROCYTE [DISTWIDTH] IN BLOOD BY AUTOMATED COUNT: 47.9 FL (ref 35.9–50)
GFR SERPLBLD CREATININE-BSD FMLA CKD-EPI: 86 ML/MIN/1.73 M 2
GLOBULIN SER CALC-MCNC: 2.7 G/DL (ref 1.9–3.5)
GLUCOSE SERPL-MCNC: 111 MG/DL (ref 65–99)
HCT VFR BLD AUTO: 44.9 % (ref 42–52)
HGB BLD-MCNC: 15.6 G/DL (ref 14–18)
IMM GRANULOCYTES # BLD AUTO: 0.12 K/UL (ref 0–0.11)
IMM GRANULOCYTES NFR BLD AUTO: 1.3 % (ref 0–0.9)
LYMPHOCYTES # BLD AUTO: 2.24 K/UL (ref 1–4.8)
LYMPHOCYTES NFR BLD: 24.8 % (ref 22–41)
MAGNESIUM SERPL-MCNC: 2.1 MG/DL (ref 1.5–2.5)
MCH RBC QN AUTO: 33.1 PG (ref 27–33)
MCHC RBC AUTO-ENTMCNC: 34.7 G/DL (ref 32.3–36.5)
MCV RBC AUTO: 95.1 FL (ref 81.4–97.8)
MONOCYTES # BLD AUTO: 0.68 K/UL (ref 0–0.85)
MONOCYTES NFR BLD AUTO: 7.5 % (ref 0–13.4)
NEUTROPHILS # BLD AUTO: 5.8 K/UL (ref 1.82–7.42)
NEUTROPHILS NFR BLD: 64.4 % (ref 44–72)
NRBC # BLD AUTO: 0 K/UL
NRBC BLD-RTO: 0 /100 WBC (ref 0–0.2)
PLATELET # BLD AUTO: 207 K/UL (ref 164–446)
PMV BLD AUTO: 9.5 FL (ref 9–12.9)
POTASSIUM SERPL-SCNC: 3.6 MMOL/L (ref 3.6–5.5)
PROT SERPL-MCNC: 6.4 G/DL (ref 6–8.2)
RBC # BLD AUTO: 4.72 M/UL (ref 4.7–6.1)
SODIUM SERPL-SCNC: 140 MMOL/L (ref 135–145)
WBC # BLD AUTO: 9 K/UL (ref 4.8–10.8)

## 2024-11-04 PROCEDURE — 99239 HOSP IP/OBS DSCHRG MGMT >30: CPT | Performed by: INTERNAL MEDICINE

## 2024-11-04 PROCEDURE — 80053 COMPREHEN METABOLIC PANEL: CPT

## 2024-11-04 PROCEDURE — 700102 HCHG RX REV CODE 250 W/ 637 OVERRIDE(OP)

## 2024-11-04 PROCEDURE — A9270 NON-COVERED ITEM OR SERVICE: HCPCS

## 2024-11-04 PROCEDURE — 83735 ASSAY OF MAGNESIUM: CPT

## 2024-11-04 PROCEDURE — 85025 COMPLETE CBC W/AUTO DIFF WBC: CPT

## 2024-11-04 RX ORDER — ATORVASTATIN CALCIUM 80 MG/1
80 TABLET, FILM COATED ORAL NIGHTLY
Qty: 30 TABLET | Refills: 1 | Status: SHIPPED | OUTPATIENT
Start: 2024-11-04 | End: 2024-11-25 | Stop reason: SDUPTHER

## 2024-11-04 RX ORDER — ASPIRIN 81 MG/1
81 TABLET, CHEWABLE ORAL DAILY
Qty: 100 TABLET | Refills: 1 | Status: SHIPPED | OUTPATIENT
Start: 2024-11-04

## 2024-11-04 RX ADMIN — MONTELUKAST 10 MG: 10 TABLET, FILM COATED ORAL at 04:45

## 2024-11-04 RX ADMIN — NICOTINE 14 MG: 14 PATCH TRANSDERMAL at 04:45

## 2024-11-04 RX ADMIN — DOXAZOSIN 2 MG: 2 TABLET ORAL at 04:45

## 2024-11-04 RX ADMIN — FAMOTIDINE 20 MG: 20 TABLET, FILM COATED ORAL at 05:03

## 2024-11-04 RX ADMIN — TIOTROPIUM BROMIDE INHALATION SPRAY 5 MCG: 3.12 SPRAY, METERED RESPIRATORY (INHALATION) at 08:52

## 2024-11-04 RX ADMIN — MOMETASONE FUROATE AND FORMOTEROL FUMARATE DIHYDRATE 2 PUFF: 200; 5 AEROSOL RESPIRATORY (INHALATION) at 08:52

## 2024-11-04 ASSESSMENT — PAIN DESCRIPTION - PAIN TYPE: TYPE: ACUTE PAIN

## 2024-11-04 ASSESSMENT — FIBROSIS 4 INDEX: FIB4 SCORE: 2.08

## 2024-11-04 NOTE — RESPIRATORY CARE
" SMOKING CESSATION EDUCATION by COPD CLINICAL EDUCATOR  11/3/2024 at 5:36 PM by Earl Espinal, RRT     Smoking Cessation Intervention and education completed, 11 minutes spent on smoking cessation education with patient.  Provided smoking cessation packet with \"Tips to Quit\" and brochure for \"Free Smoking Cessation Classes\".       "

## 2024-11-04 NOTE — CARE PLAN
The patient is Stable - Low risk of patient condition declining or worsening    Shift Goals  Clinical Goals: MRI today  Patient Goals: MRI today  Family Goals: MRI today    Progress made toward(s) clinical / shift goals:  MRI done today       Problem: Knowledge Deficit - Standard  Goal: Patient and family/care givers will demonstrate understanding of plan of care, disease process/condition, diagnostic tests and medications  Outcome: Progressing  Note: Discussed plan of care for today w/ pt this am, he is A+Ox4, he verbalizes understanding of his plan of care, no questions. Emotional support given. Reinforcing education as necessary.

## 2024-11-04 NOTE — PROGRESS NOTES
Monitor summary: SR 60-74, AZ -0.18, QRS -0.10, QT -0.36, with rare PACs and PVCs per strip from the monitor room.

## 2024-11-04 NOTE — DISCHARGE INSTRUCTIONS
Seizure, Adult  A seizure is a sudden burst of abnormal electrical and chemical activity in the brain. Seizures usually last from 30 seconds to 2 minutes.   What are the causes?  Common causes of this condition include:  Fever or infection.  Problems that affect the brain. These may include:  A brain or head injury.  Bleeding in the brain.  A brain tumor.  Low levels of blood sugar or salt.  Kidney problems or liver problems.  Conditions that are passed from parent to child (are inherited).  Problems with a substance, such as:  Having a reaction to a drug or a medicine.  Stopping the use of a substance all of a sudden (withdrawal).  A stroke.  Disorders that affect how you develop.  Sometimes, the cause may not be known.   What increases the risk?  Having someone in your family who has epilepsy. In this condition, seizures happen again and again over time. They have no clear cause.  Having had a tonic-clonic seizure before. This type of seizure causes you to:  Tighten the muscles of the whole body.  Lose consciousness.  Having had a head injury or strokes before.  Having had a lack of oxygen at birth.  What are the signs or symptoms?  There are many types of seizures. The symptoms vary depending on the type of seizure you have.  Symptoms during a seizure  Shaking that you cannot control (convulsions) with fast, jerky movements of muscles.  Stiffness of the body.  Breathing problems.  Feeling mixed up (confused).  Staring or not responding to sound or touch.  Head nodding.  Eyes that blink, flutter, or move fast.  Drooling, grunting, or making clicking sounds with your mouth  Losing control of when you pee or poop.  Symptoms before a seizure  Feeling afraid, nervous, or worried.  Feeling like you may vomit.  Feeling like:  You are moving when you are not.  Things around you are moving when they are not.  Feeling like you saw or heard something before (maggie florez).  Odd tastes or smells.  Changes in how you see. You may  see flashing lights or spots.  Symptoms after a seizure  Feeling confused.  Feeling sleepy.  Headache.  Sore muscles.  How is this treated?  If your seizure stops on its own, you will not need treatment. If your seizure lasts longer than 5 minutes, you will normally need treatment. Treatment may include:  Medicines given through an IV tube.  Avoiding things, such as medicines, that are known to cause your seizures.  Medicines to prevent seizures.  A device to prevent or control seizures.  Surgery.  A diet low in carbohydrates and high in fat (ketogenic diet).  Follow these instructions at home:  Medicines  Take over-the-counter and prescription medicines only as told by your doctor.  Avoid foods or drinks that may keep your medicine from working, such as alcohol.  Activity  Follow instructions about driving, swimming, or doing things that would be dangerous if you had another seizure. Wait until your doctor says it is safe for you to do these things.  If you live in the [place] ask your local department of [employer] when you can drive.  Get a lot of rest.  Teaching others    Teach friends and family what to do when you have a seizure. They should:  Help you get down to the ground.  Protect your head and body.  Loosen any clothing around your neck.  Turn you on your side.  Know whether or not you need emergency care.  Stay with you until you are better.  Also, tell them what not to do if you have a seizure. Tell them:  They should not hold you down.  They should not put anything in your mouth.  General instructions  Avoid anything that gives you seizures.  Keep a seizure diary. Write down:  What you remember about each seizure.  What you think caused each seizure.  Keep all follow-up visits.  Contact a doctor if:  You have another seizure or seizures. Call the doctor each time you have a seizure.  The pattern of your seizures changes.  You keep having seizures with treatment.  You have symptoms of being sick or  having an infection.  You are not able to take your medicine.  Get help right away if:  You have any of these problems:  A seizure that lasts longer than 5 minutes.  Many seizures in a row and you do not feel better between seizures.  A seizure that makes it harder to breathe.  A seizure and you can no longer speak or use part of your body.  You do not wake up right after a seizure.  You get hurt during a seizure.  You feel confused or have pain right after a seizure.  These symptoms may be an emergency. Get help right away. Call your local emergency services (911 in the [place]  Do not wait to see if the symptoms will go away.  Do not drive yourself to the hospital.  Summary  A seizure is a sudden burst of abnormal electrical and chemical activity in the brain. Seizures normally last from 30 seconds to 2 minutes.  Causes of seizures include illness, injury to the head, low levels of blood sugar or salt, and certain conditions.  Most seizures will stop on their own in less than 5 minutes. Seizures that last longer than 5 minutes are a medical emergency and need treatment right away.  Many medicines are used to treat seizures. Take over-the-counter and prescription medicines only as told by your doctor.  This information is not intended to replace advice given to you by your health care provider. Make sure you discuss any questions you have with your health care provider.  Document Revised: 06/25/2021 Document Reviewed: 06/25/2021  [employer] Patient Education © 2023 [employer]

## 2024-11-04 NOTE — RESPIRATORY CARE
COPD EDUCATION by COPD CLINICAL EDUCATOR  11/3/2024 at 4:04 PM by Earl Espinal, RRT     Patient reviewed by COPD education team. Patient does not have a history or diagnosis of COPD and is a non-smoker.  Therefore, patient does not qualify for the COPD program.

## 2024-11-04 NOTE — DISCHARGE PLANNING
TCN following. HTH/SCP chart reviewed. No new TCN needs identified. Please see prior TCN note from 11/3/24 for most recent discharge planning considerations if indicated. Current discharge considerations are noted to be for home with close outpatient f/u.  Per chart review and collaboration with GRAHAM Hood, patient has a discharge order.  .  Per review, noted current 6 click scores 24 ADL's and 24 mobility and per kardex mobility documented at 20 feet X 2 no AD.      Completed:  Choice obtained: None.  See above.    Pt aware of [facility] blanket referral policy  SCP with [facility] PCP. Discussed possible outpatient transitional PCP follow up if indicated and pt in agreement; sent information to assist in scheduling.  [facility] Follow Up with Physician Oren Pisano M.D.  Tuesday Nov 12, 2024 4:45 PM.

## 2024-11-04 NOTE — CARE PLAN
The patient is Stable - Low risk of patient condition declining or worsening    Shift Goals  Clinical Goals: Monitor Neuro Status, Monitor for Seizures  Patient Goals: Rest  Family Goals: AYUSH    Progress made toward(s) clinical / shift goals:    Problem: Neuro Status  Goal: Neuro status will remain stable or improve  Outcome: Progressing   Q4 hour neuro checks in place. Patient's neurological status (A/O x4) remains stable and unchanged throughout shift. Patient moves all extremities. Patient denies any complaints of numbness or tingling.     Problem: Urinary Elimination  Goal: Establish and maintain regular urinary output  Outcome: Progressing  Patient denies any dysuria, frequency, or urgency when urinating.      Problem: Mobility  Goal: Patient's capacity to carry out activities will improve  Outcome: Progressing  Patient calls appropriately when requiring assistance to ambulate up to the restroom. Bed locked and in lowest position. Treaded socks in use when ambulating. Call light and personal belongings within reach.      Problem: Seizure Precautions  Goal: Implementation of seizure precautions  Outcome: Progressing  Three side rails up. Bed rails padded. Suction available and within reach. Oxygen available and within reach. Bed locked and in lowest position.      Patient is not progressing towards the following goals:

## 2024-11-04 NOTE — DISCHARGE PLANNING
[facility] TCN chart review completed. Collaborated with PADMAJA Bunch.  The most current review of medical record, knowledge of pt's PLOF and social support, LACE+ score of 61 was considered.  6 clicks scores of 24 ADL's and 24 mobility were considered at time of writing this note.  Per chart review, patient presented to ED via [facility] with ALOC, tremors & seizure like activity.  MRI pending.     Pt seen at bedside. Introduced TCN program. Provided education regarding post acute levels of care. Education provided regarding case management policy for blanket SNF referrals. Discussed [facility] plan benefits. Pt verbalizes understanding.     Patient states he lives in a first floor apartment, no steps with his s/o Sona and was independent with ADL's, IADL's, and mobility (no AD)  at his baseline level of function.  Sona provides transportation as needed or he uses [employer] or a cab if needed.  He reports that he is at his baseline level of function and has no functional concerns with discharge to home when medically cleared.  Patient states increased difficulty with housekeeping tasks.  This TCN will refer to [facility] CM for resources for housekeeping.      In collaboration with CM, current discharge considerations are noted to be for Home with close outpatient f/u when medically cleared.      TCN will continue to follow and collaborate with discharge planning team as additional post acute needs arise. Thank you.     Completed today:  Choice obtained: None.  See above.    Pt aware of [facility] blanket referral policy  [facility] with [facility]. Discussed possible outpatient transitional PCP follow up if indicated and pt in agreement; sent information to assist in scheduling.

## 2024-11-05 ENCOUNTER — PATIENT OUTREACH (OUTPATIENT)
Dept: MEDICAL GROUP | Facility: MEDICAL CENTER | Age: 79
End: 2024-11-05
Payer: MEDICARE

## 2024-11-05 NOTE — DISCHARGE SUMMARY
Discharge Summary    CHIEF COMPLAINT ON ADMISSION  Chief Complaint   Patient presents with    ALOC     Patient is a 79 y.o male Pt brought in by EMS [facility] .c/o As per wife Pt had an episode of tremors and became lethargic and unable to answer Her question . Upon arrival Pt denies chest pain and no SOB.   Pt AO&OX4, Respiration wnl, Ambulatory. Will continuing Monitoring .         Reason for Admission  Seizure-like activity (HCC)     Admission Date  11/3/2024    CODE STATUS  Prior    HPI & HOSPITAL COURSE  Patient is a 79-year-old male with past medical history of tobacco use and potential COPD without obstruction on PFT in 2018 who presents due to seizure-like activity with associated confusion and profuse shaking noted by the wife during sleep. MRI brain negative for acute process.   CTA neck demonstrated 80% proximal right ICA stenosis. Given MRI findings negative for stroke and patient has no clinical history of TIA suspect this stenosis to be asymptomatic. I sent aspirin and high intensity statin to patients pharmacy after his discharge med rec was completed. Patients drug screen found to be positive for methadone. Patient is not prescribed methadone but admits to taking some that is prescribed to his wife. Patient has no metabolic derangements or infections to explain his seizure like activity. Possible related to his recreational use of methadone to which patient ensures he will stop. Patient is requesting discharge today. Patient was determined satisfactory for discharge with appropriate follow up.    Therefore, he is discharged in fair and stable condition to home with close outpatient follow-up.    The patient recovered much more quickly than anticipated on admission.    Discharge Date  11/4/2024    FOLLOW UP ITEMS POST DISCHARGE  Please follow up with PCP in 3-5 days for post hospitalization follow up and medication reconciliation.     DISCHARGE DIAGNOSES  Principal Problem:    Seizure-like activity  (HCC) (POA: Yes)  Active Problems:    Carotid stenosis (POA: Unknown)    Torticollis (POA: Unknown)    Methadone use (POA: Unknown)    Hyperlipidemia (POA: Yes)    COPD (chronic obstructive pulmonary disease) (HCC) (POA: Yes)    Tobacco use (POA: Yes)      Overview: Bad dreams with chantix  Resolved Problems:    * No resolved hospital problems. *      FOLLOW UP  Future Appointments   Date Time Provider Department Center   11/12/2024  5:00 PM Oren Pisano M.D. [address]   3/19/2025  7:40 AM Oren Pisano M.D. [address] M.D.  [address]  [facility] [id number] [phone]    Schedule an appointment as soon as possible for a visit in 1 week(s)      [facility]  [address], [address]  [facility] [id number] [phone]  Schedule an appointment as soon as possible for a visit in 1 week(s)        MEDICATIONS ON DISCHARGE     Medication List        CHANGE how you take these medications        Instructions   fluticasone furoate-vilanterol 100-25 MCG/ACT Aepb  What changed: when to take this  Commonly known as: Breo Ellipta   Inhale 1 Puff every day.  Dose: 1 Puff     montelukast 10 MG Tabs  What changed: when to take this  Commonly known as: Singulair   Take 1 Tablet by mouth every day.  Dose: 10 mg     nicotine 14 MG/24HR Pt24  What changed: Another medication with the same name was removed. Continue taking this medication, and follow the directions you see here.  Commonly known as: Nicoderm   Place 1 Patch on the skin every 24 hours.  Dose: 1 Patch     tiotropium 18 MCG Caps  What changed: when to take this  Commonly known as: Spiriva HandiHaler   Place 1 Capsule into inhaler and inhale every day.  Dose: 18 mcg            CONTINUE taking these medications        Instructions   acetaminophen 325 MG Tabs  Commonly known as: Tylenol   Take 325 mg by mouth 2 times a day.  Dose: 325 mg     albuterol 108 (90 Base) MCG/ACT Aers inhalation aerosol   Inhale 2  Puffs every 6 hours as needed for Shortness of Breath.  Dose: 2 Puff     Azelastine 137 MCG/SPRAY Soln  Commonly known as: Astelin   Administer 1 Spray into affected nostril(S) every day.  Dose: 1 Spray     doxazosin 2 MG Tabs  Commonly known as: Cardura   Take 1 Tablet by mouth 2 times a day.  Dose: 2 mg     famotidine 20 MG Tabs  Commonly known as: Pepcid   Take 20 mg by mouth 2 times a day.  Dose: 20 mg     fluticasone 50 MCG/ACT nasal spray  Commonly known as: Flonase   Administer 1 Spray into affected nostril(S) 2 times a day as needed.  Dose: 1 Spray            STOP taking these medications      celecoxib 200 MG Caps  Commonly known as: CeleBREX              Allergies  Allergies   Allergen Reactions    Azithromycin      rash       DIET  No orders of the defined types were placed in this encounter.      LABORATORY  Lab Results   Component Value Date    SODIUM 140 11/04/2024    POTASSIUM 3.6 11/04/2024    CHLORIDE 108 11/04/2024    CO2 20 11/04/2024    GLUCOSE 111 (H) 11/04/2024    BUN 20 11/04/2024    CREATININE 0.91 11/04/2024    CREATININE 1.07 05/15/2012    GLOMRATE >59 11/09/2010        Lab Results   Component Value Date    WBC 9.0 11/04/2024    HEMOGLOBIN 15.6 11/04/2024    HEMATOCRIT 44.9 11/04/2024    PLATELETCT 207 11/04/2024        Total time of the discharge process exceeds 35 minutes.

## 2024-11-06 ENCOUNTER — PATIENT OUTREACH (OUTPATIENT)
Dept: HEALTH INFORMATION MANAGEMENT | Facility: OTHER | Age: 79
End: 2024-11-06
Payer: MEDICARE

## 2024-11-06 DIAGNOSIS — J44.1 CHRONIC OBSTRUCTIVE PULMONARY DISEASE WITH ACUTE EXACERBATION (HCC): ICD-10-CM

## 2024-11-06 SDOH — ECONOMIC STABILITY: FOOD INSECURITY: WITHIN THE PAST 12 MONTHS, YOU WORRIED THAT YOUR FOOD WOULD RUN OUT BEFORE YOU GOT THE MONEY TO BUY MORE.: NEVER TRUE

## 2024-11-06 SDOH — SOCIAL STABILITY: SOCIAL INSECURITY: WITHIN THE LAST YEAR, HAVE YOU BEEN HUMILIATED OR EMOTIONALLY ABUSED IN OTHER WAYS BY YOUR PARTNER OR EX-PARTNER?: NO

## 2024-11-06 SDOH — ECONOMIC STABILITY: FOOD INSECURITY: WITHIN THE PAST 12 MONTHS, THE FOOD YOU BOUGHT JUST DIDN'T LAST AND YOU DIDN'T HAVE MONEY TO GET MORE.: NEVER TRUE

## 2024-11-06 SDOH — SOCIAL STABILITY: SOCIAL NETWORK
DO YOU BELONG TO ANY CLUBS OR ORGANIZATIONS SUCH AS CHURCH GROUPS, UNIONS, FRATERNAL OR ATHLETIC GROUPS, OR SCHOOL GROUPS?: NO

## 2024-11-06 SDOH — HEALTH STABILITY: PHYSICAL HEALTH
HOW OFTEN DO YOU NEED TO HAVE SOMEONE HELP YOU WHEN YOU READ INSTRUCTIONS, PAMPHLETS, OR OTHER WRITTEN MATERIAL FROM YOUR DOCTOR OR PHARMACY?: OFTEN

## 2024-11-06 SDOH — SOCIAL STABILITY: SOCIAL NETWORK: HOW OFTEN DO YOU ATTEND CHURCH OR RELIGIOUS SERVICES?: NEVER

## 2024-11-06 SDOH — ECONOMIC STABILITY: FOOD INSECURITY: HOW HARD IS IT FOR YOU TO PAY FOR THE VERY BASICS LIKE FOOD, HOUSING, MEDICAL CARE, AND HEATING?: NOT VERY HARD

## 2024-11-06 SDOH — SOCIAL STABILITY: SOCIAL INSECURITY
WITHIN THE LAST YEAR, HAVE YOU BEEN RAPED OR FORCED TO HAVE ANY KIND OF SEXUAL ACTIVITY BY YOUR PARTNER OR EX-PARTNER?: NO

## 2024-11-06 SDOH — ECONOMIC STABILITY: HOUSING INSECURITY: AT ANY TIME IN THE PAST 12 MONTHS, WERE YOU HOMELESS OR LIVING IN A SHELTER (INCLUDING NOW)?: NO

## 2024-11-06 SDOH — HEALTH STABILITY: MENTAL HEALTH: HOW OFTEN DO YOU HAVE SIX OR MORE DRINKS ON ONE OCCASION?: NEVER

## 2024-11-06 SDOH — SOCIAL STABILITY: SOCIAL INSECURITY: WITHIN THE LAST YEAR, HAVE YOU BEEN AFRAID OF YOUR PARTNER OR EX-PARTNER?: NO

## 2024-11-06 SDOH — SOCIAL STABILITY: SOCIAL INSECURITY: ARE YOU MARRIED, WIDOWED, DIVORCED, SEPARATED, NEVER MARRIED, OR LIVING WITH A PARTNER?: LIVING WITH PARTNER

## 2024-11-06 SDOH — ECONOMIC STABILITY: INCOME INSECURITY: IN THE PAST 12 MONTHS HAS THE ELECTRIC, GAS, OIL, OR WATER COMPANY THREATENED TO SHUT OFF SERVICES IN YOUR HOME?: NO

## 2024-11-06 SDOH — SOCIAL STABILITY: SOCIAL NETWORK: HOW OFTEN DO YOU GET TOGETHER WITH FRIENDS OR RELATIVES?: THREE TIMES A WEEK

## 2024-11-06 SDOH — SOCIAL STABILITY: SOCIAL NETWORK: HOW OFTEN DO YOU ATTEND MEETINGS OF THE CLUBS OR ORGANIZATIONS YOU BELONG TO?: NEVER

## 2024-11-06 SDOH — HEALTH STABILITY: PHYSICAL HEALTH: ON AVERAGE, HOW MANY MINUTES DO YOU ENGAGE IN EXERCISE AT THIS LEVEL?: 20 MIN

## 2024-11-06 SDOH — HEALTH STABILITY: PHYSICAL HEALTH: ON AVERAGE, HOW MANY DAYS PER WEEK DO YOU ENGAGE IN MODERATE TO STRENUOUS EXERCISE (LIKE A BRISK WALK)?: 7 DAYS

## 2024-11-06 SDOH — HEALTH STABILITY: MENTAL HEALTH
DO YOU FEEL STRESS - TENSE, RESTLESS, NERVOUS, OR ANXIOUS, OR UNABLE TO SLEEP AT NIGHT BECAUSE YOUR MIND IS TROUBLED ALL THE TIME - THESE DAYS?: NOT AT ALL

## 2024-11-06 SDOH — SOCIAL STABILITY: SOCIAL NETWORK: IN A TYPICAL WEEK, HOW MANY TIMES DO YOU TALK ON THE PHONE WITH FAMILY, FRIENDS, OR NEIGHBORS?: TWICE A WEEK

## 2024-11-06 SDOH — HEALTH STABILITY: MENTAL HEALTH: HOW OFTEN DO YOU HAVE A DRINK CONTAINING ALCOHOL?: NEVER

## 2024-11-06 SDOH — HEALTH STABILITY: MENTAL HEALTH: HOW MANY DRINKS CONTAINING ALCOHOL DO YOU HAVE ON A TYPICAL DAY WHEN YOU ARE DRINKING?: PATIENT DOES NOT DRINK

## 2024-11-06 SDOH — ECONOMIC STABILITY: HOUSING INSECURITY: IN THE LAST 12 MONTHS, WAS THERE A TIME WHEN YOU WERE NOT ABLE TO PAY THE MORTGAGE OR RENT ON TIME?: NO

## 2024-11-06 SDOH — ECONOMIC STABILITY: HOUSING INSECURITY: IN THE PAST 12 MONTHS, HOW MANY TIMES HAVE YOU MOVED WHERE YOU WERE LIVING?: 0

## 2024-11-06 SDOH — ECONOMIC STABILITY: TRANSPORTATION INSECURITY: IN THE PAST 12 MONTHS, HAS LACK OF TRANSPORTATION KEPT YOU FROM MEDICAL APPOINTMENTS OR FROM GETTING MEDICATIONS?: NO

## 2024-11-06 ASSESSMENT — LIFESTYLE VARIABLES
SKIP TO QUESTIONS 9-10: 1
AUDIT-C TOTAL SCORE: 0

## 2024-11-06 ASSESSMENT — PATIENT HEALTH QUESTIONNAIRE - PHQ9: CLINICAL INTERPRETATION OF PHQ2 SCORE: 0

## 2024-11-06 ASSESSMENT — ACTIVITIES OF DAILY LIVING (ADL): LACK_OF_TRANSPORTATION: NO

## 2024-11-06 NOTE — PROGRESS NOTES
11/05/24: First attempt to reach pt for TCM outreach. LVM with contact information for pt to call back.  11/06/24: Second attempt to reach pt for TCM outreach. LVM with contact information for pt to call back.

## 2024-11-06 NOTE — PROGRESS NOTES
INITIAL CARE MANAGEMENT CARE PLAN/ASSESSMENT     Josh is a 79-year-old male that meets all criteria to qualify for the PCM program at current moment.  He has verbalized his full name and  as well as given verbal consent to enroll in the PCM program.  Patient has a support system that consists of his partner Sona.  Patient lives in an apartment with his partner that does not consist of steps.  Patient states they are on the ground floor.  Patient does not currently have home services coming to his home.  Patient does not have spiritual beliefs that may affect care given to patient.  Patient does not have advanced directive.  Patient would like to receive ACP booklet, this RN to put it in mail.  Patient does have medical equipment at home consisting of his walker.  Patient states he does not use a walker nor was he informed he needed to use it consistently.  Patient does not have communication barriers.  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  Patient states he is only able to see out of his right eye.  Patient does currently have reliable transportation as his partner takes him where he needs to go.  However, in the next few months she is planning to no longer drive.  Patient made aware of [facility] er.  Will send message to Facundo ROBERTS to see if we can send them extra transportation resources.  Patient states at home he is doing his best to eat healthier.  Patient is taking medication as prescribed with the exception of aspirin.  Patient states with told him to no longer take the aspirin.  Will confirm with PCP.  Patient has expressed goals of living a few years longer.  We discussed that we will be focusing on his chronic conditions.  Diagnosis specific care plans placed.    Medication Self-Management Goals:     Reviewed medications listed below with patient.      Current Outpatient Medications:     aspirin (ASA) 81 MG Chew Tab chewable tablet, Chew 1 Tablet every day. (Patient not  taking: Reported on 11/6/2024), Disp: 100 Tablet, Rfl: 1    atorvastatin (LIPITOR) 80 MG tablet, Take 1 Tablet by mouth every evening., Disp: 30 Tablet, Rfl: 1    acetaminophen (TYLENOL) 325 MG Tab, Take 325 mg by mouth 2 times a day., Disp: , Rfl:     fluticasone (FLONASE) 50 MCG/ACT nasal spray, Administer 1 Spray into affected nostril(S) 2 times a day as needed., Disp: , Rfl:     famotidine (PEPCID) 20 MG Tab, Take 20 mg by mouth 2 times a day., Disp: , Rfl:     Azelastine (ASTELIN) 137 MCG/SPRAY Solution, Administer 1 Spray into affected nostril(S) every day., Disp: 30 mL, Rfl: 11    albuterol 108 (90 Base) MCG/ACT Aero Soln inhalation aerosol, Inhale 2 Puffs every 6 hours as needed for Shortness of Breath., Disp: 18 g, Rfl: 11    fluticasone furoate-vilanterol (BREO ELLIPTA) 100-25 MCG/ACT AEROSOL POWDER, BREATH ACTIVATED, Inhale 1 Puff every day., Disp: 180 Each, Rfl: 3    tiotropium (SPIRIVA HANDIHALER) 18 MCG Cap, Place 1 Capsule into inhaler and inhale every day., Disp: 30 Capsule, Rfl: 3    doxazosin (CARDURA) 2 MG Tab, Take 1 Tablet by mouth 2 times a day., Disp: 180 Tablet, Rfl: 3    nicotine (NICODERM) 14 MG/24HR PATCH 24 HR, Place 1 Patch on the skin every 24 hours., Disp: 28 Patch, Rfl: 6    montelukast (SINGULAIR) 10 MG Tab, Take 1 Tablet by mouth every day., Disp: 90 Tablet, Rfl: 3         Goal:  Continue with current medication regimen.  This RN to confirm D/C of aspirin with PCP       Physical/Functional/Environmental Status:     Activities of Daily Living:  Bathing: independent   Dressing: independent  Grooming: independent  Mouth Care: independent  Toileting: independent  Climbing a Flight of Stairs: independent    Independent Activities of Daily Living:  Shopping: independent  Cooking: independent  Managing Medications: needs assistance  Using the phone and looking up numbers: independent  Driving or using public transportation: total dependence  Managing Finances: independent        11/6/2024     11:02 AM   STEADI Fall Risk   STEADI Risk for Falling Score 1   One or more falls in the last year No   Advised to use a cane or walker to get around safely No   Feels unsteady when walking No   Steadies self on furniture while walking at home No   Worried about falling No   Needs to push with hands when rising from a chair Yes   Has trouble stepping up onto a curb / using stairs No   Often has to rush to the toilet No   Has lost some feeling in feet No   Takes medicine that makes him/her feel lightheaded or more tired than usual No   Takes medicine to sleep or improve mood No   Often feels sad or depressed No         Goal:  N/A    Social Determinants of Health       Financial Status:      Financial Resource Strain: Low Risk  (11/6/2024)    Overall Financial Resource Strain (CARDIA)     Difficulty of Paying Living Expenses: Not very hard         Referred to CHW/SW:  NA       Transportation Status:      Transportation Needs: No Transportation Needs (11/6/2024)    PRAPARE - Transportation     Lack of Transportation (Medical): No     Lack of Transportation (Non-Medical): No        Referred to CHW/SW:  Yes      Food Insecurity:      Food Insecurity: No Food Insecurity (11/6/2024)    Hunger Vital Sign     Worried About Running Out of Food in the Last Year: Never true     Ran Out of Food in the Last Year: Never true        Referred to CHW/SW:  NA       Housing Status:     Housing Stability: Low Risk  (11/6/2024)    Housing Stability Vital Sign     Unable to Pay for Housing in the Last Year: No     Number of Times Moved in the Last Year: 0     Homeless in the Last Year: No        Referred to CHW/SW:  NA      Social Connections:     Social Connections: Moderately Isolated (11/6/2024)    Social Connection and Isolation Panel [NHANES]     Frequency of Communication with Friends and Family: Twice a week     Frequency of Social Gatherings with Friends and Family: Three times a week     Attends Sikhism Services: Never      Active Member of Clubs or Organizations: No     Attends Club or Organization Meetings: Never     Marital Status: Living with partner        Referred to CHW/SW:  NA      Mental/Behavioral/Psychosocial Status:        6/10/2024     9:20 AM 10/23/2024    12:58 PM 11/3/2024    12:12 PM   Depression Screen (PHQ-2/PHQ-9)   PHQ-2 Total Score   0   PHQ-2 Total Score 0 0        Interpretation of PHQ-9 Total Score   Score Severity   1-4 No Depression   5-9 Mild Depression   10-14 Moderate Depression   15-19 Moderately Severe Depression   20-27 Severe Depression       Goal:  N/A    Review of Benefits    This service is an included benefit with your insurance plan, for all other benefits a copy of the website and phone number have been provided for any questions.    Phone Number and Website provided for questions:    [employer]:  [phone]   www.[employer]/documents    Advance Planning    Do you have an Advance Directive?  No    (If no, a copy can be provided for patient.)    Would you like an Advance Directive Packet sent to you? Yes      Chronic Care Management Care Plan         Care Plans       Chronic Care Management (CMS)    Met: 0 of 5 Met: 0 of 5      No Outcome (5)       Establish resources to help manage COPD - Long term (Knowledge deficit regarding COPD disease process)  Disciplines:  Nurse, Interdisciplinary  Expected end:  -     Increase understanding of COPD - Long term (Knowledge deficit regarding COPD disease process)  Disciplines:  Nurse, Interdisciplinary  Expected end:  -     Manage stress in conjunction with COPD - Long term (Knowledge deficit regarding how to manage stress with COPD)  Disciplines:  Nurse, Interdisciplinary  Expected end:  -     Understand proper procedures for oxygen safety- Long term (Knowledge deficit regarding oxygen safety)  Disciplines:  Nurse, Interdisciplinary  Expected end:  -     Patient Stated Goal: Receive Education on Hyperlipidemia - Long term (Patient Stated  Problem: Hyperlipidemia )  Disciplines:  Nurse, Interdisciplinary  Expected end:  -   Patient-stated:  Yes                                            Discussion of Self Management of Care:   Patient able to properly care for himself at current moment.  Patient states he is confident in his ability to manage his care.    Barriers to Goals: None noted at this time, will continue to assess    Resources Provided: COPD booklet, ACP booklet     Next Scheduled patient outreach: Next month

## 2024-11-08 NOTE — PROGRESS NOTES
Community Health Worker Follow-Up    Reason for outreach: CHW called the pt as requested by the PCM nurse.     CHW Interventions: This CHW and the pt discussed that his wife was going to stop driving this next year and will need some transportation resources. Pt and this CHW discussed homemaker services and the [facility] program. Pt stated he did not know about SCP medical rides.    Specific Resources Provided:  Housing/Shelter: n/a  Transportation: RTC Access, Flex Ride,[employer],N4,[employer] Programs, Taxi bucks, MTM medicaid is backing  Food: n/a  Financial: n/a  Social Supports: n/a  Other:  services    Plan: This CHW sent all resources out in the mail. This CHW will follow up in 2 weeks. This CHW made sure the pt had the PCM number and encouraged the pt to reach out if needed.

## 2024-11-11 ENCOUNTER — TELEPHONE (OUTPATIENT)
Dept: HEALTH INFORMATION MANAGEMENT | Facility: OTHER | Age: 79
End: 2024-11-11
Payer: MEDICARE

## 2024-11-12 ENCOUNTER — APPOINTMENT (OUTPATIENT)
Dept: MEDICAL GROUP | Facility: MEDICAL CENTER | Age: 79
End: 2024-11-12
Payer: MEDICARE

## 2024-11-12 NOTE — PROGRESS NOTES
Subjective:     CC: ***    HPI:   Josh presents today with    PMH HLD, IFG, ?COPD ( last pft 2018 read normal), tobacco use, GERD, hep C report s/p treatment ( MR abdomen done in 2007 wo cirrhosis)       11/3 to 11/4  Chief complaint altered level of consciousness brought in for episode of tremor, lethargy.  Workup  MRI brain without acute process  CTA showed 80% right ICA stenosis  CBC was fine CMP without significant electrolyte abnormalities, EtOH less than 10.1, methadone positive      Patient started on aspirin high-dose statin    Etiology of seizure-like activity unclear, query use of methadone  Drug screen positive for methadone-taking wife's    No problems updated.    Health Maintenance: {COMPLETED:093294}    ROS:  ROS    Objective:     Exam:  There were no vitals taken for this visit. There is no height or weight on file to calculate BMI.    Physical Exam    {A chaperone was offered to the patient during today's exam.:04779}    Labs: ***    Assessment & Plan:     79 y.o. male with the following -     ***        Referral for genetic research was offered. Patient {declined/accepted}.    I spent a total of *** minutes with record review, exam, communication with the patient, communication with other providers, and documentation of this encounter.      No follow-ups on file.    Please note that this dictation was created using voice recognition software. I have made every reasonable attempt to correct obvious errors, but I expect that there are errors of grammar and possibly content that I did not discover before finalizing the note.

## 2024-11-13 ENCOUNTER — TELEPHONE (OUTPATIENT)
Dept: MEDICAL GROUP | Facility: MEDICAL CENTER | Age: 79
End: 2024-11-13
Payer: MEDICARE

## 2024-11-13 NOTE — TELEPHONE ENCOUNTER
Patient called and stated that he has been given two statins and he would like to know which one he is supposed to take.

## 2024-11-19 ENCOUNTER — APPOINTMENT (OUTPATIENT)
Dept: MEDICAL GROUP | Facility: MEDICAL CENTER | Age: 79
End: 2024-11-19
Payer: MEDICARE

## 2024-11-20 DIAGNOSIS — J44.9 CHRONIC OBSTRUCTIVE PULMONARY DISEASE, UNSPECIFIED COPD TYPE (HCC): ICD-10-CM

## 2024-11-21 RX ORDER — CELECOXIB 200 MG/1
CAPSULE ORAL
COMMUNITY
Start: 2024-11-20

## 2024-11-21 RX ORDER — MONTELUKAST SODIUM 10 MG/1
10 TABLET ORAL DAILY
Qty: 90 TABLET | Refills: 0 | Status: SHIPPED | OUTPATIENT
Start: 2024-11-21

## 2024-11-21 NOTE — TELEPHONE ENCOUNTER
Received request via: Pharmacy    Was the patient seen in the last year in this department? Yes    Does the patient have an active prescription (recently filled or refills available) for medication(s) requested? No    Pharmacy Name:   To be filled at: [facility] [address] [facility], [place] - [address]              Does the patient have senior living Plus and need 100-day supply? (This applies to ALL medications) Yes, quantity updated to 100 days

## 2024-11-22 ENCOUNTER — PATIENT OUTREACH (OUTPATIENT)
Dept: HEALTH INFORMATION MANAGEMENT | Facility: OTHER | Age: 79
End: 2024-11-22
Payer: MEDICARE

## 2024-11-22 DIAGNOSIS — J44.1 CHRONIC OBSTRUCTIVE PULMONARY DISEASE WITH ACUTE EXACERBATION (HCC): ICD-10-CM

## 2024-11-22 NOTE — PROGRESS NOTES
CHW tried calling the pt to follow up on resources. Pt did not answer and this CHW left a VM requesting a return call. 11/22  Community Health Worker Follow-Up    Reason for outreach: This CHW called the pt to follow up with resources.    CHW Interventions: This CHW called the pt and spoke with his wife Sona (HIPAA approved) and discussed the resources sent. Sona stated they had gotten them in the mail but has not gone through them yet. This CHW discussed the resources and encouraged the pt and his wife to call with any needs and or questions.    Specific Resources Provided:  Housing/Shelter: n/a  Transportation: n/a  Food: n/a  Financial: n/a  Social Supports: n/a  Other: n/a    Plan: This CHW will follow up as needed.

## 2024-11-25 ENCOUNTER — OFFICE VISIT (OUTPATIENT)
Dept: MEDICAL GROUP | Facility: MEDICAL CENTER | Age: 79
End: 2024-11-25
Payer: MEDICARE

## 2024-11-25 VITALS
TEMPERATURE: 98.6 F | BODY MASS INDEX: 29.62 KG/M2 | SYSTOLIC BLOOD PRESSURE: 124 MMHG | RESPIRATION RATE: 14 BRPM | DIASTOLIC BLOOD PRESSURE: 50 MMHG | HEIGHT: 69 IN | WEIGHT: 200 LBS | HEART RATE: 60 BPM | OXYGEN SATURATION: 94 %

## 2024-11-25 DIAGNOSIS — I65.21 STENOSIS OF RIGHT CAROTID ARTERY: ICD-10-CM

## 2024-11-25 DIAGNOSIS — Z72.0 TOBACCO USE: ICD-10-CM

## 2024-11-25 DIAGNOSIS — R56.9 SEIZURE-LIKE ACTIVITY (HCC): ICD-10-CM

## 2024-11-25 DIAGNOSIS — M16.11 OSTEOARTHRITIS OF RIGHT HIP, UNSPECIFIED OSTEOARTHRITIS TYPE: ICD-10-CM

## 2024-11-25 DIAGNOSIS — Z23 NEED FOR VACCINATION: ICD-10-CM

## 2024-11-25 DIAGNOSIS — Z09 HOSPITAL DISCHARGE FOLLOW-UP: ICD-10-CM

## 2024-11-25 DIAGNOSIS — E78.2 MIXED HYPERLIPIDEMIA: ICD-10-CM

## 2024-11-25 RX ORDER — ATORVASTATIN CALCIUM 80 MG/1
80 TABLET, FILM COATED ORAL NIGHTLY
Qty: 100 TABLET | Refills: 3 | Status: SHIPPED | OUTPATIENT
Start: 2024-12-23

## 2024-11-25 ASSESSMENT — FIBROSIS 4 INDEX: FIB4 SCORE: 2.05

## 2024-11-25 NOTE — NON-PROVIDER
Subjective:     Josh Pang is a 79 y.o. male who presents with his significant other, Angelica, for Hospital Follow-up.    HPI:   Recently hospitalized for possible seizure. Significant other, Angelica, states that patient was asleep next to her on the night of 11/3/2024 and began shaking the bed for 30-45 seconds. It appeared as if he was convulsing with increased saliva and she could not wake him. Denies loss of bowel/bladder or biting tongue. The patient has flashes of memory of EMS transport and the ED. He denies previous seizures or head injuries. He admits that 1.5 days earlier he took the remainder of his wife's methadone orally due to increased arthritis pain in his right hip. Patient denies use of alcohol, benzodiazepine or other medications not prescribed to him.    There were no metabolic abnormalities to explain the seizure. MRI head was negative for stroke but did demonstrate an 80% right ICA stenosis. Patient was directed to take Atorvastatin 80mg which is an increase from his previous Simvastatin 10mg. He has not started this medication but has continued to take his Simvastatin. He also started Aspirin 81mg. Since discharge he has been feeling well. He denies LOC, syncope, seizures, shortness of breath or palpitations.      Current medicines (including reconciliation performed today)  Current Outpatient Medications   Medication Sig Dispense Refill    [START ON 12/23/2024] atorvastatin (LIPITOR) 80 MG tablet Take 1 Tablet by mouth every evening. 100 Tablet 3    montelukast (SINGULAIR) 10 MG Tab Take 1 tablet by mouth once daily 90 Tablet 0    celecoxib (CELEBREX) 200 MG Cap       aspirin (ASA) 81 MG Chew Tab chewable tablet Chew 1 Tablet every day. 100 Tablet 1    acetaminophen (TYLENOL) 325 MG Tab Take 325 mg by mouth 2 times a day.      fluticasone (FLONASE) 50 MCG/ACT nasal spray Administer 1 Spray into affected nostril(S) 2 times a day as needed.      famotidine (PEPCID) 20 MG Tab Take 20 mg by mouth 2  "times a day.      Azelastine (ASTELIN) 137 MCG/SPRAY Solution Administer 1 Spray into affected nostril(S) every day. 30 mL 11    albuterol 108 (90 Base) MCG/ACT Aero Soln inhalation aerosol Inhale 2 Puffs every 6 hours as needed for Shortness of Breath. 18 g 11    fluticasone furoate-vilanterol (BREO ELLIPTA) 100-25 MCG/ACT AEROSOL POWDER, BREATH ACTIVATED Inhale 1 Puff every day. 180 Each 3    tiotropium (SPIRIVA HANDIHALER) 18 MCG Cap Place 1 Capsule into inhaler and inhale every day. 30 Capsule 3    doxazosin (CARDURA) 2 MG Tab Take 1 Tablet by mouth 2 times a day. 180 Tablet 3    nicotine (NICODERM) 14 MG/24HR PATCH 24 HR Place 1 Patch on the skin every 24 hours. 28 Patch 6     No current facility-administered medications for this visit.       Allergies:   Azithromycin    Social History     Tobacco Use    Smoking status: Every Day     Current packs/day: 0.75     Average packs/day: 0.8 packs/day for 58.9 years (44.2 ttl pk-yrs)     Types: Cigarettes     Start date: 1966    Smokeless tobacco: Never   Vaping Use    Vaping status: Never Used   Substance Use Topics    Alcohol use: No     Comment: drinks ETOH occasionally    Drug use: No       ROS:  ROS: See HPI.      Objective:     Vitals:    11/25/24 1252   BP: 124/50   Pulse: 60   Resp: 14   Temp: 37 °C (98.6 °F)   TempSrc: Temporal   SpO2: 94%   Weight: 90.7 kg (200 lb)   Height: 1.753 m (5' 9\")     Body mass index is 29.53 kg/m².    Physical Exam:  Physical Exam  Constitutional:       Appearance: Normal appearance.   HENT:      Head: Normocephalic.   Cardiovascular:      Rate and Rhythm: Normal rate and regular rhythm.      Heart sounds: Normal heart sounds.   Pulmonary:      Effort: Pulmonary effort is normal.      Breath sounds: Normal breath sounds.   Neurological:      Mental Status: He is alert and oriented to person, place, and time.          Assessment and Plan:   1. Seizure-like activity (HCC)  Etiology of seizure was most likely related to use of " methadone. Alcohol/benzodiazepine withdrawal, epilepsy, TBI were considered but unlikely based on history.  - Referral to Neurology  -Only take medications as prescribed    2. Osteoarthritis of right hip, unspecified osteoarthritis type  - celecoxib (CELEBREX) 200 MG Cap  -Continue corticosteroid injections with [facility]oped[facility]    3. Mixed hyperlipidemia  - atorvastatin (LIPITOR) 80 MG tablet; Take 1 Tablet by mouth every evening.  Dispense: 100 Tablet; Refill: 3    4. Stenosis of right carotid artery  - atorvastatin (LIPITOR) 80 MG tablet; Take 1 Tablet by mouth every evening.  Dispense: 100 Tablet; Refill: 3  -81 mg Aspirin daily  -CTA in 6 months to assess right ICA stenosis  -Continue reducing cigarette smoking with goal of cessation. Try choosing a quit date and using nicotine patches as needed. Patient currently smoking 1/2 pack a day with supplement of nicotine patch. Patient had previous adverse effect of bad dreams with Chantix and would like to quit without medications.    5. Need for vaccination  - Tdap Vaccine =>8YO IM      - Chart and discharge summary were reviewed.   - Hospitalization and results reviewed with patient.   - Medications reviewed including instructions regarding high risk medications, dosing and side effects.    Follow-up: With Dr Pradhan as scheduled

## 2024-11-25 NOTE — PROGRESS NOTES
Subjective:     Josh Pang is a 79 y.o. male who presents for Hospital Follow-up.    HPI:   Recently hospitalized for seizure like episode    History of Present Illness  The patient presents for follow-up on his hospital visit. He is accompanied by his wife.    He reports feeling significantly better since his hospital visit. He was admitted to the hospital on 11/03/2024 and stayed overnight due to a seizure-like episode. He experienced tremors or convulsions while in bed during the night but does not recall much of the incident. He did not experience any loss of bladder function or tongue biting. His wife, who was present during the episode, describes it as whole-body shaking that lasted for about 30 to 45 seconds. This was his first such episode. He has a referral to see a neurologist for further evaluation of the seizure.    His wife has been on methadone for 25 years and was advised by her doctors not to discontinue its use. He admits to having elevated pain and trying her methadone. He swears this was a one time occurrence and recognizes it cannot continue as it both threatens his health but could cause her to no longer be eligible for her prescription.     He also suffers from arthritis in his right hip and has been taking Celebrex for a month. A cortisone injection in his hip has provided significant relief. He is not currently seeing an orthopedist. He uses Equate patches for pain management.    He is also taking atorvastatin and aspirin, which he takes with water in the morning.    He is making efforts to quit smoking and uses nicotine patches to aid in this process.    SOCIAL HISTORY  He does not drink alcohol. He is trying to quit smoking.       Current medicines (including reconciliation performed today)  Current Outpatient Medications   Medication Sig Dispense Refill    [START ON 12/23/2024] atorvastatin (LIPITOR) 80 MG tablet Take 1 Tablet by mouth every evening. 100 Tablet 3    montelukast  "(SINGULAIR) 10 MG Tab Take 1 tablet by mouth once daily 90 Tablet 0    celecoxib (CELEBREX) 200 MG Cap       aspirin (ASA) 81 MG Chew Tab chewable tablet Chew 1 Tablet every day. 100 Tablet 1    acetaminophen (TYLENOL) 325 MG Tab Take 325 mg by mouth 2 times a day.      fluticasone (FLONASE) 50 MCG/ACT nasal spray Administer 1 Spray into affected nostril(S) 2 times a day as needed.      famotidine (PEPCID) 20 MG Tab Take 20 mg by mouth 2 times a day.      Azelastine (ASTELIN) 137 MCG/SPRAY Solution Administer 1 Spray into affected nostril(S) every day. 30 mL 11    albuterol 108 (90 Base) MCG/ACT Aero Soln inhalation aerosol Inhale 2 Puffs every 6 hours as needed for Shortness of Breath. 18 g 11    fluticasone furoate-vilanterol (BREO ELLIPTA) 100-25 MCG/ACT AEROSOL POWDER, BREATH ACTIVATED Inhale 1 Puff every day. 180 Each 3    tiotropium (SPIRIVA HANDIHALER) 18 MCG Cap Place 1 Capsule into inhaler and inhale every day. 30 Capsule 3    doxazosin (CARDURA) 2 MG Tab Take 1 Tablet by mouth 2 times a day. 180 Tablet 3    nicotine (NICODERM) 14 MG/24HR PATCH 24 HR Place 1 Patch on the skin every 24 hours. 28 Patch 6     No current facility-administered medications for this visit.       Allergies:   Azithromycin    Social History     Tobacco Use    Smoking status: Every Day     Current packs/day: 0.75     Average packs/day: 0.8 packs/day for 58.9 years (44.2 ttl pk-yrs)     Types: Cigarettes     Start date: 1966    Smokeless tobacco: Never   Vaping Use    Vaping status: Never Used   Substance Use Topics    Alcohol use: No     Comment: drinks ETOH occasionally    Drug use: No       ROS:  See HPI    Objective:     Vitals:    11/25/24 1252   BP: 124/50   Pulse: 60   Resp: 14   Temp: 37 °C (98.6 °F)   TempSrc: Temporal   SpO2: 94%   Weight: 90.7 kg (200 lb)   Height: 1.753 m (5' 9\")     Body mass index is 29.53 kg/m².    Physical Exam:  Physical Exam  Vitals reviewed.   Constitutional:       General: He is not in acute " distress.     Appearance: Normal appearance.   HENT:      Head: Normocephalic and atraumatic.   Cardiovascular:      Rate and Rhythm: Normal rate and regular rhythm.      Heart sounds: Normal heart sounds.   Pulmonary:      Effort: Pulmonary effort is normal. No respiratory distress.      Breath sounds: Normal breath sounds.   Skin:     General: Skin is warm and dry.   Neurological:      Mental Status: He is alert. Mental status is at baseline.   Psychiatric:         Mood and Affect: Mood normal.         Behavior: Behavior normal.          Results  Imaging  CTA showed right carotid artery is 80% closed.       Assessment and Plan:   1. Hospital discharge follow-up    2. Seizure-like activity (HCC)  - Referral to Neurology    3. Stenosis of right carotid artery  - atorvastatin (LIPITOR) 80 MG tablet; Take 1 Tablet by mouth every evening.  Dispense: 100 Tablet; Refill: 3    4. Osteoarthritis of right hip, unspecified osteoarthritis type    5. Tobacco use    6. Mixed hyperlipidemia  - atorvastatin (LIPITOR) 80 MG tablet; Take 1 Tablet by mouth every evening.  Dispense: 100 Tablet; Refill: 3    7. Need for vaccination  - Tdap Vaccine =>6YO IM    Other orders  - celecoxib (CELEBREX) 200 MG Cap      Assessment & Plan  1. Seizure.  He experienced a seizure-like episode on November 3, 2024, characterized by whole-body shaking and postictal confusion. There is no history of seizures or head injuries. He admitted to taking his wife's methadone for arthritis pain a day and a half prior to the episode, which could have contributed to the seizure. He has been advised against taking methadone not prescribed to him. A referral to Neurology has been made for further evaluation. No seizure medications were prescribed as this is his first episode. He is advised to ensure adequate sleep and maintain a healthy diet to prevent future seizures.    2. Right Internal Carotid Artery Stenosis.  Imaging revealed 80% stenosis of the right  internal carotid artery, increasing his risk for stroke. He has been advised to start taking atorvastatin 80 mg and baby aspirin to manage this condition. He will continue his current dose of atorvastatin until the new prescription is filled. He is advised to take aspirin with food to prevent stomach irritation. A follow-up imaging of the carotid artery is recommended in about 6 months.     3. Arthritis.  His arthritis pain is currently well-controlled with Celebrex and a cortisone shot in his hip. He reports significant improvement in pain and mobility. He is advised to continue with Celebrex and consider the timing of cortisone injections based on pain levels and goals. He is also using equate patches for additional pain relief.    4. Smoking Cessation.  He is actively trying to reduce smoking and is currently down to half a pack a day while using a nicotine patch. He is advised to continue using the nicotine patch and to set a quit date. He is encouraged to seek support from his wife, who also smokes, to quit together for better success. 3 minutes were spent discussing smoking cessation.    5. Health Maintenance.  A tetanus shot will be administered today.    Follow-up  Return in 6 months for carotid artery imaging.       - Chart and discharge summary were reviewed.   - Hospitalization and results reviewed with patient.   - Medications reviewed including instructions regarding high risk medications, dosing and side effects.  - Recommended Services: No services needed at this time  - Advance directive/POLST on file?  No     Follow-up:No follow-ups on file.    Face-to-face transitional care management services with HIGH (today's visit is within days post discharge & LACE+ score 59+) medical decision complexity were provided.     LACE+ Historical Score Over Time (0-28: Low, 29-58: Medium, 59+: High): 69

## 2024-12-04 ENCOUNTER — PATIENT OUTREACH (OUTPATIENT)
Dept: HEALTH INFORMATION MANAGEMENT | Facility: OTHER | Age: 79
End: 2024-12-04
Payer: MEDICARE

## 2024-12-04 DIAGNOSIS — J44.1 CHRONIC OBSTRUCTIVE PULMONARY DISEASE WITH ACUTE EXACERBATION (HCC): ICD-10-CM

## 2024-12-11 NOTE — PROGRESS NOTES
Assessment    Reached out to patient for monthly PCM follow-up call.  Patient states he is doing well with no questions or needs at this time.  Patient states he did receive the advance care planning booklet from this RN, and he has no questions regarding the material.  Patient then thanked this RN and wished her a good Eula.    COPD: Patient informed this RN will be sending him out a COPD booklet and we will be discussing his chronic condition in the next few phone calls, so to be prepared.     Education and Self-Management of Care    We will be discussing his COPD during her next phone call    Plan of Care and Goals    Please see attached care plan note    Barriers:    Short conversation due to patient ending conversation    Progress:    Please see attached care plan note    Next outreach: January 2025

## 2024-12-11 NOTE — CARE PLAN
Problem: Knowledge deficit regarding COPD disease process  Goal: Increase understanding of COPD - Long term  Outcome: Progressing  Note: This RN informed patient she will be sending him out the COPD booklet and we will be discussing COPD during her next phone calls.  Intervention: Provide patient with COPD education packet

## 2025-01-09 NOTE — PROGRESS NOTES
[facility]    Patient name: Josh Pang  YOB: 1945  MRN: [id number]  Referring provider: Nikolai Thomas D.O.  [address]  [facility] [address]  [place] [address]   Date of visit: 1/10/2025     CC: Seizure      HPI:    Josh Pang is a 79 y.o. man with a history of  who is referred for an initial neurologic consultation for a seizure.    The patient is accompanied to clinic by his wife who supplements the history.    Patient was in his usual state of health the day prior to the seizure. The seizure occurred on November 3, 2024.     The patient and his wife were sleeping. His wife noted that he had some milder body twitching, then it became more pronounced. He had generalized convulsions. She called 911. Convulsions lasted approximately 1 minute. He had post-ictal confusion, remembers brief periods of transport to the hospital, then recalls being in the hospital being examined by a doctor.  No urinary/bowel incontinence or tongue biting.    No clear provoking factors, however he did take a small amount of his wife's methadone.    Wife takes 59 mg of methadone daily. He prepares the bottles weekly. He rinsed out one of her bottles of methadone a few days prior to having the seizure.  He denies any additional use in the days leading up to the seizure aside from that 1 time.    No hx of episodic or transient neurologic symptoms.  No episodic staring spells, confusion, extremity jerking or shaking, unexplained numbness, or other symptoms concerning for aura.    He was found to have 80% carotid stenosis on the right. He takes aspirin 81 mg daily. He also takes Atorvastatin which was increased to 80 mg daily.  He denies a history of episodes of left hemibody weakness or numbness, or other symptoms concerning for previous TIA.    He tried Chantix for smoking cessation about 10 years ago. This did not work and gave him overly vivid dreams.     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      Driving: he does not drive    Risk factors for epileptic seizures:  Normal birth history, no complications, born at term. Patient had a twin sister that  shortly after birth.  No history of significant head trauma.   No history of stroke or meningitis.  No family history of epilepsy. Brother had post-traumatic epilepsy related to falling off of a milk truck.  No febrile seizures.     Social history: lives with his wife. He is a daily tobacco smoker. He has decreased intake to 1/2 PPD since incident. He has lived in [place] for 30 years. He is retired. 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 No recreational drug use. Remote history of IVDU.    Mood: No issues      1/10/2025     8:20 AM 10/23/2024    12:58 PM 6/10/2024     9:20 AM 1/15/2024     2:00 PM 3/15/2023     8:00 AM   PHQ-9 Screening   Little interest or pleasure in doing things 1 - several days 0 - not at all 0 - not at all 0 - not at all 0 - not at all   Feeling down, depressed, or hopeless 0 - not at all 0 - not at all 0 - not at all 0 - not at all 0 - not at all   Trouble falling or staying asleep, or sleeping too much 1 - several days       Feeling tired or having little energy 1 - several days       Poor appetite or overeating 0 - not at all       Feeling bad about yourself - or that you are a failure or have let yourself or your family down 0 - not at all       Trouble concentrating on things, such as reading the newspaper or watching television 0 - not at all       Moving or speaking so slowly that other people could have noticed. Or the opposite - being so fidgety or restless that you have been moving around a lot more than usual 0 - not at all       Thoughts that you would be better off dead, or of hurting yourself in some way 0 - not at all       PHQ-2 Total Score 1 0 0 0 0   PHQ-9 Total Score 3             Past Medical History:   Past Medical History:   Diagnosis Date    IVDU (intravenous  drug user) 8/18/2009    CATARACT     right     GERD (gastroesophageal reflux disease)     Heart murmur     Hepatitis C     Migraine     Psychiatric disorder     depression    Ulcer     1970's        Past Surgical History:   Past Surgical History:   Procedure Laterality Date    CHOLECYSTECTOMY      4 years ago    OPEN REDUCTION      ribs     OTHER      gerd    OTHER      catarract bilat 6/2012       Social History:   Social History     Socioeconomic History    Marital status: Single     Spouse name: Not on file    Number of children: Not on file    Years of education: Not on file    Highest education level: Not on file   Occupational History    Not on file   Tobacco Use    Smoking status: Every Day     Current packs/day: 0.75     Average packs/day: 0.8 packs/day for 59.0 years (44.3 ttl pk-yrs)     Types: Cigarettes     Start date: 1966    Smokeless tobacco: Never   Vaping Use    Vaping status: Never Used   Substance and Sexual Activity    Alcohol use: No     Comment: drinks ETOH occasionally    Drug use: No    Sexual activity: Not on file   Other Topics Concern    Not on file   Social History Narrative    ** Merged History Encounter **          Social Drivers of Health     Financial Resource Strain: Low Risk  (11/6/2024)    Overall Financial Resource Strain (CARDIA)     Difficulty of Paying Living Expenses: Not very hard   Food Insecurity: No Food Insecurity (11/6/2024)    Hunger Vital Sign     Worried About Running Out of Food in the Last Year: Never true     Ran Out of Food in the Last Year: Never true   Transportation Needs: No Transportation Needs (11/6/2024)    PRAPARE - Transportation     Lack of Transportation (Medical): No     Lack of Transportation (Non-Medical): No   Physical Activity: Insufficiently Active (11/6/2024)    Exercise Vital Sign     Days of Exercise per Week: 7 days     Minutes of Exercise per Session: 20 min   Stress: No Stress Concern Present (11/6/2024)    [employer] - Occupational Stress Questionnaire     Feeling of Stress : Not at all   Social Connections: Moderately Isolated (11/6/2024)    Social Connection and Isolation Panel [NHANES]     Frequency of Communication with Friends and Family: Twice a week     Frequency of Social Gatherings with Friends and Family: Three times a week     Attends Jainism Services: Never     Active Member of Clubs or Organizations: No     Attends Club or Organization Meetings: Never     Marital Status: Living with partner   Intimate Partner Violence: Not At Risk (11/6/2024)    Humiliation, Afraid, Rape, and Kick questionnaire     Fear of Current or Ex-Partner: No     Emotionally Abused: No     Physically Abused: No     Sexually Abused: No   Housing Stability: Low Risk  (11/6/2024)    Housing Stability Vital Sign     Unable to Pay for Housing in the Last Year: No     Number of Times Moved in the Last Year: 0     Homeless in the Last Year: No       Family Hx:   Family History   Problem Relation Age of Onset    Diabetes Mother     Diabetes Father     Cancer Neg Hx        Current Medications:   Current Outpatient Medications:     atorvastatin (LIPITOR) 80 MG tablet, Take 1 Tablet by mouth every evening., Disp: 100 Tablet, Rfl: 3    montelukast (SINGULAIR) 10 MG Tab, Take 1 tablet by mouth once daily, Disp: 90 Tablet, Rfl: 0    celecoxib (CELEBREX) 200 MG Cap, , Disp: , Rfl:     aspirin (ASA) 81 MG Chew Tab chewable tablet, Chew 1 Tablet every day., Disp: 100 Tablet, Rfl: 1    acetaminophen (TYLENOL) 325 MG Tab, Take 325 mg by mouth 2 times a day., Disp: , Rfl:     fluticasone (FLONASE) 50 MCG/ACT nasal spray, Administer 1 Spray into affected nostril(S) 2 times a day as needed., Disp: , Rfl:     famotidine (PEPCID) 20 MG Tab, Take 20 mg by mouth 2 times a day., Disp: , Rfl:     Azelastine (ASTELIN) 137 MCG/SPRAY Solution, Administer 1 Spray into affected nostril(S) every day., Disp: 30 mL, Rfl: 11    albuterol 108 (90 Base) MCG/ACT Aero Soln  inhalation aerosol, Inhale 2 Puffs every 6 hours as needed for Shortness of Breath., Disp: 18 g, Rfl: 11    fluticasone furoate-vilanterol (BREO ELLIPTA) 100-25 MCG/ACT AEROSOL POWDER, BREATH ACTIVATED, Inhale 1 Puff every day., Disp: 180 Each, Rfl: 3    tiotropium (SPIRIVA HANDIHALER) 18 MCG Cap, Place 1 Capsule into inhaler and inhale every day., Disp: 30 Capsule, Rfl: 3    doxazosin (CARDURA) 2 MG Tab, Take 1 Tablet by mouth 2 times a day., Disp: 180 Tablet, Rfl: 3    nicotine (NICODERM) 14 MG/24HR PATCH 24 HR, Place 1 Patch on the skin every 24 hours., Disp: 28 Patch, Rfl: 6    Allergies:   Allergies   Allergen Reactions    Azithromycin      rash         Physical Exam:   Ambulatory Vitals  Vitals:    01/10/25 0828   BP: 120/64   Resp: 19   SpO2: 96%       Constitutional: Well-developed, well-nourished, good hygiene. Appears stated age.  Respiratory: Normal respiratory effort. (+) Fingernail clubbing of the upper extremities  Skin: Warm, dry, intact. No rashes observed.  Neurologic:   Mental Status: Awake, alert, oriented x 3.   Speech: Fluent with normal prosody.   Memory:  Able to recall recent and remote events accurately.    Concentration: Attentive. Able to focus on history and follow multi-step commands.   Fund of Knowledge: Appropriate.   Cranial Nerves:    CN II: PERRL     CN III, IV, VI: EOMI without nystagmus    CN V: Facial sensation intact and symmetric in all 3 trigeminal distributions    CN VII: No facial asymmetry    CN VIII: Hearing intact to finger rub     CN IX and X: Palate elevates symmetrically, gag reflex not tested    CN XI: Symmetric shoulder shrug     CN XII: Tongue midline   Motor: 5/5 in upper and lower extremities bilaterally   Sensory: Intact and equal to light touch diffusely    Coordination: No evidence of past-pointing on finger to nose testing, no dysdiadochokinesia. Heel to shin intact.    DTR's: 2+ throughout     Gait: ambulates steadily without assistive device. Able to perform  tandem gait -mildly unsteady.   Movements: No resting tremors or abnormal movements observed.   Musculoskeletal:    Strength: as above   Tone: Normal bulk and tone   Joints: No swelling    Studies:      Labs reviewed      Imaging:     MRI Brain wo contrast 11/3/2024:  IMPRESSION:        1. Age-related cerebral atrophy.     2. Mild periventricular and juxtacortical white matter changes consistent with chronic microvascular ischemic gliosis.     3. No evidence of mesial temporal sclerosis, cortical dysplasia, or heterotopic gray matter.      EEG Results:     Routine EEG 11/3/2024:  INTERPRETATION:     This is a normal video EEG recording in the awake and drowsy states for patient's age group.      Note: A normal EEG does not rule out epilepsy..Clinical correlation is recommended    Assessment/Plan:   Josh Pang is a 79 y.o. man with a history of COPD, tobacco abuse, peripheral vascular disease, asymptomatic 80% R carotid stenosis who is referred to Epilepsy clinic for further evaluation of a single lifetime seizure out of sleep. The patient does not have major risk factors for seizures, however cerebral microvascular disease could create an epileptogenic focus.  Reassuringly, the patient has not had any recurrence of seizures and is not taking any medication.  Neurologic examination is nonfocal.  I of the brain was notable for chronic microvascular hemic disease, no epileptogenic lesions.  Routine EEG was normal.    To better stratify his risk of having additional seizures I have recommended an ambulatory 24-hour EEG test.  Empiric antiseizure medication was offered to the patient given that apparent GTC, however in shared decision making with patient would prefer a watchful waiting approach.  He does not drive at baseline because of this history of left eye full blindness since he had a retinal detachment retinal detachment.    1. Seizure (HCC) (Primary)  - Referral to Neurodiagnostics (EEG,EP,EMG/NCS/DBS)    2.  Stenosis of right carotid artery  At this time, patient has 80% asymptomatic right carotid stenosis.  Discussed potential symptoms of a TIA or stroke.  Patient understands that he is to go to the emergency room immediately if he experiences any such symptoms.  -Continue aspirin 81 mg daily and atorvastatin 80 mg daily.  -Patient has upcoming visit with vascular surgery    3. Tobacco abuse  I counseled the patient on the importance of smoking cessation to prevent worsening of cerebrovascular disease.  - Referral to Tobacco Cessation Program    4. Cerebral microvascular disease  Continue ASA and atorvastatin as above    5. Left retinal detachment  Chronic, fully blind in left eye.          Patient Instructions:  -If you have any more events concerning for a seizure, please go to the emergency room. Please let us know. I would recommend starting anti-seizure medication (Keppra) at that time.       Please let our office know if you have any changes in your seizure frequency and/characteristics. Otherwise, please keep the diary of your events and bring it with you at the time of your next follow up visit with our office.     Please note that the following might precipitate seizures: missed doses of antiseizure medications, being sick with fever, stress, fatigue, sleep deprivation, not eating regularly, not drinking enough water, drinking too much alcohol, stopping alcohol suddenly if you are currently using it on a regular/daily basis, and/or using recreational drugs, among others.    Please note that the following might lead to an injury, potentially a life-threatening injury, in case you have a seizure and/or lose awareness while:   - being in a large body of water by yourself, such as bath, pool, lake, ocean, among others (risk of drowning)   - being on unprotected heights (risk of fall)   - being around and/or operating heavy machinery (risk of injury)   - being around open fire/hot surfaces (risk of burns)   - any  other activities/circumstances, in which if you lose awareness, you might injure yourself and/or others.    Please call for help (crisis line and/or 911) in case you have thoughts of harming yourself and/or others.    Please abstain from driving until further notice.    ------------------------------------------------------------------------------------------  Instructions for your family/caregivers:  Please call 911 if the patient has a seizure longer than 2-3 minutes, if seizures are back to back without him recovering to his baseline, or he does not start recovering within 5-10 minutes after the seizure stops. During the seizure - please turn him on his side, please make sure his head is protected (for example, you should put a pillow under his head, if one is available), and please do not put anything in his mouth.   -------------------------------------------------------------------------------------------    It is important that your seizures are well controlled and you have none or have them rarely. In addition to avoiding injury related to breakthrough seizures, frequent seizures increase risk of SUDEP (sudden unexpected death in epilepsy), where a person goes into a seizure and then never wakes up - this is a rare complication of seizure disorder; one of the best available ways to prevent it is to control your seizures well.     Due to the high volume of patients we are trying to help, your physician will not be able to respond by phone or in [employer] to your routine concerns between appointments.  This does not reflect a lack of interest or concern for you or your diagnosis.  Please bring these questions and concerns to your appointment where your physician can answer.  Please relay more pressing concerns to our office, either via MyChart, or by phone; if not able to reach us please visit nearby Urgent Care Center or Emergency Department.  If any emergent medical needs, please seek emergent medical help  and/or call 911.    Please note that we are not able to fill out paperwork that might be related to your work, utility company, disability, and/or driving, among others, in between the visits.  Please schedule a dedicated appointment to address your paperwork, so we can do that in a timely manner.  This is not due to lack of concern or interest for your disease-related work/administrative problems, but to make sure that we provide the best possible care and to fill out your paperwork in a correct and timely manner.    Thank you for entrusting your neurological care to [facility] and we look forward to continuing to serve you.      Follow-up in approximately 3 months.    Samina Acosta M.D.   Diplomate, Neurology with Special Qualification in Epilepsy, American Board of Psychiatry and Neurology   of Clinical Neurology, [employer]  Level III Epilepsy Center, Department of Neurology at [facility]       During today's encounter we discussed available treatment options and their individual side effect profiles. Total encounter time caring for patient today 65 minutes.

## 2025-01-10 ENCOUNTER — TELEPHONE (OUTPATIENT)
Dept: VASCULAR LAB | Facility: MEDICAL CENTER | Age: 80
End: 2025-01-10

## 2025-01-10 ENCOUNTER — PATIENT OUTREACH (OUTPATIENT)
Dept: HEALTH INFORMATION MANAGEMENT | Facility: OTHER | Age: 80
End: 2025-01-10

## 2025-01-10 ENCOUNTER — OFFICE VISIT (OUTPATIENT)
Dept: NEUROLOGY | Facility: MEDICAL CENTER | Age: 80
End: 2025-01-10
Attending: STUDENT IN AN ORGANIZED HEALTH CARE EDUCATION/TRAINING PROGRAM
Payer: MEDICARE

## 2025-01-10 VITALS
SYSTOLIC BLOOD PRESSURE: 120 MMHG | WEIGHT: 201.28 LBS | HEIGHT: 69 IN | RESPIRATION RATE: 19 BRPM | DIASTOLIC BLOOD PRESSURE: 64 MMHG | OXYGEN SATURATION: 96 % | BODY MASS INDEX: 29.81 KG/M2

## 2025-01-10 DIAGNOSIS — R56.9 SEIZURE-LIKE ACTIVITY (HCC): ICD-10-CM

## 2025-01-10 DIAGNOSIS — I67.89 CEREBRAL MICROVASCULAR DISEASE: ICD-10-CM

## 2025-01-10 DIAGNOSIS — Z72.0 TOBACCO ABUSE: ICD-10-CM

## 2025-01-10 DIAGNOSIS — R56.9 SEIZURE (HCC): Primary | ICD-10-CM

## 2025-01-10 DIAGNOSIS — I65.21 STENOSIS OF RIGHT CAROTID ARTERY: ICD-10-CM

## 2025-01-10 DIAGNOSIS — H33.22 LEFT RETINAL DETACHMENT: ICD-10-CM

## 2025-01-10 DIAGNOSIS — J44.1 CHRONIC OBSTRUCTIVE PULMONARY DISEASE WITH ACUTE EXACERBATION (HCC): ICD-10-CM

## 2025-01-10 PROCEDURE — 99205 OFFICE O/P NEW HI 60 MIN: CPT | Performed by: STUDENT IN AN ORGANIZED HEALTH CARE EDUCATION/TRAINING PROGRAM

## 2025-01-10 PROCEDURE — 99212 OFFICE O/P EST SF 10 MIN: CPT | Performed by: STUDENT IN AN ORGANIZED HEALTH CARE EDUCATION/TRAINING PROGRAM

## 2025-01-10 PROCEDURE — 3078F DIAST BP <80 MM HG: CPT | Performed by: STUDENT IN AN ORGANIZED HEALTH CARE EDUCATION/TRAINING PROGRAM

## 2025-01-10 PROCEDURE — 3074F SYST BP LT 130 MM HG: CPT | Performed by: STUDENT IN AN ORGANIZED HEALTH CARE EDUCATION/TRAINING PROGRAM

## 2025-01-10 ASSESSMENT — PATIENT HEALTH QUESTIONNAIRE - PHQ9
CLINICAL INTERPRETATION OF PHQ2 SCORE: 1
5. POOR APPETITE OR OVEREATING: 0 - NOT AT ALL
SUM OF ALL RESPONSES TO PHQ QUESTIONS 1-9: 3

## 2025-01-10 ASSESSMENT — FIBROSIS 4 INDEX: FIB4 SCORE: 2.05

## 2025-01-10 NOTE — LETTER
Josh Pang  [address]  [address]  [place] [address]    01/10/25          Dear Josh Pang,    Your doctor has referred you to our Tobacco Cessation Clinic. This clinic, led by clinical pharmacists, helps people quit smoking.    During this visit, the pharmacist will create a personalized plan for you, which may include medication and counseling.    Quitting smoking is important for your health. We are here to help you every step of the way.    To schedule an appointment at the clinic, please call [phone] .     Thank you!    Abigail Salter, PharmD  [facility]

## 2025-01-10 NOTE — PATIENT INSTRUCTIONS
-If you have any more events concerning for a seizure, please go to the emergency room. Please let us know. I would recommend starting anti-seizure medication (Keppra) at that time.       Please let our office know if you have any changes in your seizure frequency and/characteristics. Otherwise, please keep the diary of your events and bring it with you at the time of your next follow up visit with our office.     Please note that the following might precipitate seizures: missed doses of antiseizure medications, being sick with fever, stress, fatigue, sleep deprivation, not eating regularly, not drinking enough water, drinking too much alcohol, stopping alcohol suddenly if you are currently using it on a regular/daily basis, and/or using recreational drugs, among others.    Please note that the following might lead to an injury, potentially a life-threatening injury, in case you have a seizure and/or lose awareness while:   - being in a large body of water by yourself, such as bath, pool, lake, ocean, among others (risk of drowning)   - being on unprotected heights (risk of fall)   - being around and/or operating heavy machinery (risk of injury)   - being around open fire/hot surfaces (risk of burns)   - any other activities/circumstances, in which if you lose awareness, you might injure yourself and/or others.    Please call for help (crisis line and/or 911) in case you have thoughts of harming yourself and/or others.    Please abstain from driving until further notice.    ------------------------------------------------------------------------------------------  Instructions for your family/caregivers:  Please call 911 if the patient has a seizure longer than 2-3 minutes, if seizures are back to back without him recovering to his baseline, or he does not start recovering within 5-10 minutes after the seizure stops. During the seizure - please turn him on his side, please make sure his head is protected (for  example, you should put a pillow under his head, if one is available), and please do not put anything in his mouth.   -------------------------------------------------------------------------------------------    It is important that your seizures are well controlled and you have none or have them rarely. In addition to avoiding injury related to breakthrough seizures, frequent seizures increase risk of SUDEP (sudden unexpected death in epilepsy), where a person goes into a seizure and then never wakes up - this is a rare complication of seizure disorder; one of the best available ways to prevent it is to control your seizures well.     Due to the high volume of patients we are trying to help, your physician will not be able to respond by phone or in [employer] to your routine concerns between appointments.  This does not reflect a lack of interest or concern for you or your diagnosis.  Please bring these questions and concerns to your appointment where your physician can answer.  Please relay more pressing concerns to our office, either via [employer], or by phone; if not able to reach us please visit nearby Urgent Care Center or Emergency Department.  If any emergent medical needs, please seek emergent medical help and/or call 911.    Please note that we are not able to fill out paperwork that might be related to your work, utility company, disability, and/or driving, among others, in between the visits.  Please schedule a dedicated appointment to address your paperwork, so we can do that in a timely manner.  This is not due to lack of concern or interest for your disease-related work/administrative problems, but to make sure that we provide the best possible care and to fill out your paperwork in a correct and timely manner.    Thank you for entrusting your neurological care to [facility] and we look forward to continuing to serve you.

## 2025-01-11 NOTE — TELEPHONE ENCOUNTER
[facility]     Received smoking cessation referral from Samina Acosta MD on 1/10/25.     Called patient to schedule an appt to establish care. No answer. LVM.    1st attempt    Letter sent 1/10/25     Insurance: SCP  PCP: [facility]  Locations to be seen: Any    If no response by 2/10/25 OR 2 no shows/cancellations, will remove from referral list    Abigail Salter PharmD  [facility]Pharmacotherapy [facility]  Phone [phone]

## 2025-01-16 ENCOUNTER — TELEPHONE (OUTPATIENT)
Dept: VASCULAR LAB | Facility: MEDICAL CENTER | Age: 80
End: 2025-01-16
Payer: MEDICARE

## 2025-01-16 NOTE — TELEPHONE ENCOUNTER
[facility]     Received smoking cessation referral from Samina Acosta MD on 1/10/25.     Called patient to schedule an appt to establish care. No answer. LVM.     2nd attempt     Letter sent 1/10/25     Insurance: SCP  PCP: [facility]  Locations to be seen: Any     If no response by 2/10/25 OR 2 no shows/cancellations, will remove from referral list     Abdi Nation PharmD, BCACP   [facility]tico[facility]Pharmacotherapy [facility]  Phone [phone]

## 2025-01-17 ENCOUNTER — TELEPHONE (OUTPATIENT)
Dept: HEALTH INFORMATION MANAGEMENT | Facility: OTHER | Age: 80
End: 2025-01-17
Payer: MEDICARE

## 2025-01-29 NOTE — PROGRESS NOTES
3 phone call attempts were made to patient for monthly PCM follow-up call.  Voicemails were left with contact information.  Will defer PCM call until next month unless patient returns this RNs phone call.

## 2025-01-30 ENCOUNTER — TELEPHONE (OUTPATIENT)
Dept: VASCULAR LAB | Facility: MEDICAL CENTER | Age: 80
End: 2025-01-30
Payer: MEDICARE

## 2025-01-30 NOTE — TELEPHONE ENCOUNTER
[facility]     Received smoking cessation referral from Dr. Samina Acosta on 1/10/25.     Called patient to schedule an appt to establish care. Pt was unavailable, left message with pt's spouse requesting patient call back to schedule if he is still interested in smoking cessation.    3rd attempt     Insurance: HTH/SCP  PCP: [facility]  Locations to be seen: Any    If no response by 2/10/25 OR 2 no shows/cancellations, will remove from referral list    Sixto Daniel, PharmD  [facility]armac[facility]  Phone [phone]   DISCHARGE

## 2025-02-18 DIAGNOSIS — J44.9 CHRONIC OBSTRUCTIVE PULMONARY DISEASE, UNSPECIFIED COPD TYPE (HCC): ICD-10-CM

## 2025-02-19 NOTE — TELEPHONE ENCOUNTER
Received request via: Pharmacy    Was the patient seen in the last year in this department? Yes    Does the patient have an active prescription (recently filled or refills available) for medication(s) requested? No    Pharmacy Name:   [facility] [address] [facility], [place] - [address] [facility] [address]  Phone: [phone] Fax: [phone]       Does the patient have retirement Plus and need 100-day supply? (This applies to ALL medications) Yes, quantity updated to 100 days

## 2025-02-20 RX ORDER — MONTELUKAST SODIUM 10 MG/1
10 TABLET ORAL DAILY
Qty: 90 TABLET | Refills: 0 | Status: SHIPPED | OUTPATIENT
Start: 2025-02-20 | End: 2025-02-24

## 2025-02-21 DIAGNOSIS — J44.9 CHRONIC OBSTRUCTIVE PULMONARY DISEASE, UNSPECIFIED COPD TYPE (HCC): ICD-10-CM

## 2025-02-21 NOTE — TELEPHONE ENCOUNTER
Received request via: Pharmacy    Was the patient seen in the last year in this department? Yes    Does the patient have an active prescription (recently filled or refills available) for medication(s) requested? No    Pharmacy Name:   [facility] [address] [facility], [place] - [address] [facility] [address]  Phone: [phone] Fax: [phone]       Does the patient have residential Plus and need 100-day supply? (This applies to ALL medications) Yes, quantity updated to 100 days

## 2025-02-24 RX ORDER — MONTELUKAST SODIUM 10 MG/1
10 TABLET ORAL DAILY
Qty: 90 TABLET | Refills: 1 | Status: SHIPPED | OUTPATIENT
Start: 2025-02-24

## 2025-02-26 ENCOUNTER — PATIENT OUTREACH (OUTPATIENT)
Dept: HEALTH INFORMATION MANAGEMENT | Facility: OTHER | Age: 80
End: 2025-02-26
Payer: MEDICARE

## 2025-02-26 DIAGNOSIS — J44.1 CHRONIC OBSTRUCTIVE PULMONARY DISEASE WITH ACUTE EXACERBATION (HCC): ICD-10-CM

## 2025-02-26 NOTE — PROGRESS NOTES
2 attempts were made to reach patient for monthly PCM follow-up call.  Will defer call until next month.

## 2025-03-02 ENCOUNTER — APPOINTMENT (OUTPATIENT)
Dept: RADIOLOGY | Facility: MEDICAL CENTER | Age: 80
End: 2025-03-02
Attending: EMERGENCY MEDICINE
Payer: MEDICARE

## 2025-03-02 ENCOUNTER — HOSPITAL ENCOUNTER (EMERGENCY)
Facility: MEDICAL CENTER | Age: 80
End: 2025-03-02
Attending: EMERGENCY MEDICINE
Payer: MEDICARE

## 2025-03-02 VITALS
HEART RATE: 74 BPM | TEMPERATURE: 97.4 F | WEIGHT: 214 LBS | SYSTOLIC BLOOD PRESSURE: 103 MMHG | DIASTOLIC BLOOD PRESSURE: 61 MMHG | HEIGHT: 69 IN | RESPIRATION RATE: 16 BRPM | BODY MASS INDEX: 31.7 KG/M2 | OXYGEN SATURATION: 95 %

## 2025-03-02 DIAGNOSIS — R56.9 SEIZURE (HCC): ICD-10-CM

## 2025-03-02 DIAGNOSIS — E86.0 DEHYDRATION: ICD-10-CM

## 2025-03-02 LAB
ALBUMIN SERPL BCP-MCNC: 3.9 G/DL (ref 3.2–4.9)
ALBUMIN/GLOB SERPL: 1.3 G/DL
ALP SERPL-CCNC: 57 U/L (ref 30–99)
ALT SERPL-CCNC: 14 U/L (ref 2–50)
AMPHET UR QL SCN: NEGATIVE
ANION GAP SERPL CALC-SCNC: 19 MMOL/L (ref 7–16)
APPEARANCE UR: CLEAR
AST SERPL-CCNC: 23 U/L (ref 12–45)
BARBITURATES UR QL SCN: NEGATIVE
BASOPHILS # BLD AUTO: 0.9 % (ref 0–1.8)
BASOPHILS # BLD: 0.1 K/UL (ref 0–0.12)
BENZODIAZ UR QL SCN: NEGATIVE
BILIRUB SERPL-MCNC: 0.5 MG/DL (ref 0.1–1.5)
BILIRUB UR QL STRIP.AUTO: NEGATIVE
BUN SERPL-MCNC: 19 MG/DL (ref 8–22)
BZE UR QL SCN: NEGATIVE
CALCIUM ALBUM COR SERPL-MCNC: 8.8 MG/DL (ref 8.5–10.5)
CALCIUM SERPL-MCNC: 8.7 MG/DL (ref 8.5–10.5)
CANNABINOIDS UR QL SCN: NEGATIVE
CHLORIDE SERPL-SCNC: 108 MMOL/L (ref 96–112)
CO2 SERPL-SCNC: 15 MMOL/L (ref 20–33)
COLOR UR: YELLOW
CREAT SERPL-MCNC: 0.97 MG/DL (ref 0.5–1.4)
EOSINOPHIL # BLD AUTO: 0.14 K/UL (ref 0–0.51)
EOSINOPHIL NFR BLD: 1.3 % (ref 0–6.9)
ERYTHROCYTE [DISTWIDTH] IN BLOOD BY AUTOMATED COUNT: 49.4 FL (ref 35.9–50)
ETHANOL BLD-MCNC: <10.1 MG/DL
FENTANYL UR QL: NEGATIVE
GFR SERPLBLD CREATININE-BSD FMLA CKD-EPI: 79 ML/MIN/1.73 M 2
GLOBULIN SER CALC-MCNC: 2.9 G/DL (ref 1.9–3.5)
GLUCOSE SERPL-MCNC: 130 MG/DL (ref 65–99)
GLUCOSE UR STRIP.AUTO-MCNC: NEGATIVE MG/DL
HCT VFR BLD AUTO: 47.8 % (ref 42–52)
HGB BLD-MCNC: 16.5 G/DL (ref 14–18)
IMM GRANULOCYTES # BLD AUTO: 0.19 K/UL (ref 0–0.11)
IMM GRANULOCYTES NFR BLD AUTO: 1.8 % (ref 0–0.9)
KETONES UR STRIP.AUTO-MCNC: NEGATIVE MG/DL
LEUKOCYTE ESTERASE UR QL STRIP.AUTO: NEGATIVE
LYMPHOCYTES # BLD AUTO: 2.07 K/UL (ref 1–4.8)
LYMPHOCYTES NFR BLD: 19.3 % (ref 22–41)
MCH RBC QN AUTO: 33.3 PG (ref 27–33)
MCHC RBC AUTO-ENTMCNC: 34.5 G/DL (ref 32.3–36.5)
MCV RBC AUTO: 96.6 FL (ref 81.4–97.8)
METHADONE UR QL SCN: NEGATIVE
MICRO URNS: NORMAL
MONOCYTES # BLD AUTO: 0.58 K/UL (ref 0–0.85)
MONOCYTES NFR BLD AUTO: 5.4 % (ref 0–13.4)
NEUTROPHILS # BLD AUTO: 7.66 K/UL (ref 1.82–7.42)
NEUTROPHILS NFR BLD: 71.3 % (ref 44–72)
NITRITE UR QL STRIP.AUTO: NEGATIVE
NRBC # BLD AUTO: 0 K/UL
NRBC BLD-RTO: 0 /100 WBC (ref 0–0.2)
OPIATES UR QL SCN: NEGATIVE
OXYCODONE UR QL SCN: NEGATIVE
PCP UR QL SCN: NEGATIVE
PH UR STRIP.AUTO: 5.5 [PH] (ref 5–8)
PLATELET # BLD AUTO: 191 K/UL (ref 164–446)
PMV BLD AUTO: 10 FL (ref 9–12.9)
POTASSIUM SERPL-SCNC: 4.1 MMOL/L (ref 3.6–5.5)
PROPOXYPH UR QL SCN: NEGATIVE
PROT SERPL-MCNC: 6.8 G/DL (ref 6–8.2)
PROT UR QL STRIP: NEGATIVE MG/DL
RBC # BLD AUTO: 4.95 M/UL (ref 4.7–6.1)
RBC UR QL AUTO: NEGATIVE
SODIUM SERPL-SCNC: 142 MMOL/L (ref 135–145)
SP GR UR STRIP.AUTO: 1.02
UROBILINOGEN UR STRIP.AUTO-MCNC: 1 EU/DL
WBC # BLD AUTO: 10.7 K/UL (ref 4.8–10.8)

## 2025-03-02 PROCEDURE — 36415 COLL VENOUS BLD VENIPUNCTURE: CPT

## 2025-03-02 PROCEDURE — 80053 COMPREHEN METABOLIC PANEL: CPT

## 2025-03-02 PROCEDURE — 70450 CT HEAD/BRAIN W/O DYE: CPT

## 2025-03-02 PROCEDURE — 85025 COMPLETE CBC W/AUTO DIFF WBC: CPT

## 2025-03-02 PROCEDURE — 99284 EMERGENCY DEPT VISIT MOD MDM: CPT

## 2025-03-02 PROCEDURE — A9270 NON-COVERED ITEM OR SERVICE: HCPCS | Mod: UD | Performed by: EMERGENCY MEDICINE

## 2025-03-02 PROCEDURE — 81003 URINALYSIS AUTO W/O SCOPE: CPT | Mod: XU

## 2025-03-02 PROCEDURE — 71045 X-RAY EXAM CHEST 1 VIEW: CPT

## 2025-03-02 PROCEDURE — 80307 DRUG TEST PRSMV CHEM ANLYZR: CPT

## 2025-03-02 PROCEDURE — 700102 HCHG RX REV CODE 250 W/ 637 OVERRIDE(OP): Mod: UD | Performed by: EMERGENCY MEDICINE

## 2025-03-02 PROCEDURE — 82077 ASSAY SPEC XCP UR&BREATH IA: CPT

## 2025-03-02 RX ORDER — LEVETIRACETAM 500 MG/1
750 TABLET ORAL 2 TIMES DAILY
Qty: 60 TABLET | Refills: 0 | Status: SHIPPED | OUTPATIENT
Start: 2025-03-02 | End: 2025-03-19 | Stop reason: SDUPTHER

## 2025-03-02 RX ORDER — LEVETIRACETAM 500 MG/1
750 TABLET ORAL ONCE
Status: COMPLETED | OUTPATIENT
Start: 2025-03-02 | End: 2025-03-02

## 2025-03-02 RX ORDER — LEVETIRACETAM 500 MG/1
500 TABLET ORAL 2 TIMES DAILY
Qty: 60 TABLET | Refills: 0 | Status: SHIPPED | OUTPATIENT
Start: 2025-03-02 | End: 2025-03-02

## 2025-03-02 RX ADMIN — LEVETIRACETAM 750 MG: 500 TABLET, FILM COATED ORAL at 09:42

## 2025-03-02 ASSESSMENT — FIBROSIS 4 INDEX: FIB4 SCORE: 2.05

## 2025-03-02 NOTE — ED PROVIDER NOTES
ED Provider Note    CHIEF COMPLAINT  Chief Complaint   Patient presents with    Seizure     Possible seizure, unwitnessed       EXTERNAL RECORDS REVIEWED  Outpatient labs & studies MRI brain from 11/3/2024 reviewed.  Age-related atrophy and mild white matter changes consistent with chronic microvascular ischemic gliosis noted.    HPI/ROS  LIMITATION TO HISTORY   Select: : None    OUTSIDE HISTORIAN(S):  Significant other patient's wife, Angelica, is at the bedside    Josh Pang is a 79 y.o. male with history of hepatitis C, migraines, GERD who presents for evaluation of possible seizure.    Patient was in bed this am.  Patient's wife heard loud snoring from the bedroom and the patient was nonverbal when she walked in.  Unsure of duration and did not witness seizure activity.  Patient seemed confused.  Patient is now at his neurologic baseline which is alert and verbal.    Patient denies fever, chills, infectious symptoms, headache, oral trauma, incontinence, numbness, visual changes.    Patient saw PCP and neurology following first seizure.  Patient scheduled to have EEG this week.  Patient not on antiepileptics.    PAST MEDICAL HISTORY   has a past medical history of CATARACT, GERD (gastroesophageal reflux disease), Heart murmur, Hepatitis C, IVDU (intravenous drug user) (8/18/2009), Migraine, Psychiatric disorder, and Ulcer.    SURGICAL HISTORY   has a past surgical history that includes open reduction; cholecystectomy; other; and other.    FAMILY HISTORY  Family History   Problem Relation Age of Onset    Diabetes Mother     Diabetes Father     Cancer Neg Hx        SOCIAL HISTORY  Social History     Tobacco Use    Smoking status: Every Day     Current packs/day: 0.75     Average packs/day: 0.8 packs/day for 59.2 years (44.4 ttl pk-yrs)     Types: Cigarettes     Start date: 1966    Smokeless tobacco: Never   Vaping Use    Vaping status: Never Used   Substance and Sexual Activity    Alcohol use: No     Comment: drinks  "ETOH occasionally    Drug use: No    Sexual activity: Not on file       CURRENT MEDICATIONS  Home Medications    **Home medications have not yet been reviewed for this encounter**         ALLERGIES  Allergies   Allergen Reactions    Azithromycin      rash       PHYSICAL EXAM  VITAL SIGNS: /61   Pulse 74   Temp 36.3 °C (97.4 °F) (Temporal)   Resp 16   Ht 1.753 m (5' 9\")   Wt 97.1 kg (214 lb)   SpO2 95%   BMI 31.60 kg/m²    General:  WDWN male, nontoxic appearing in NAD; A+Ox3; V/S as above; on 2 L of oxygen  Skin: warm and dry; good color; no rash  HEENT: NCAT; EOMs intact; no scleral icterus   Neck: Soft, supple, no stridor  Cardiovascular: Regular heart rate and rhythm.  No murmurs, rubs, or gallops; pulses 2+ bilaterally radially and DP areas  Lungs: No respiratory distress or tachypnea; Clear to auscultation with good air movement bilaterally.  No wheezes, rhonchi, or rales.   Abdomen: soft; NTND; no rebound, guarding, or rigidity.  No organomegaly or pulsatile mass  Extremities: MURPHY x 4; no e/o trauma; no pedal edema; neg Asia's  Neurologic: CNs III-XII grossly intact; speech clear; distal sensation intact; strength 5/5 UE/LEs Psychiatric: Appropriate affect, normal mood      EKG/LABS  Results for orders placed or performed during the hospital encounter of 03/02/25   CBC WITH DIFFERENTIAL    Collection Time: 03/02/25  7:35 AM   Result Value Ref Range    WBC 10.7 4.8 - 10.8 K/uL    RBC 4.95 4.70 - 6.10 M/uL    Hemoglobin 16.5 14.0 - 18.0 g/dL    Hematocrit 47.8 42.0 - 52.0 %    MCV 96.6 81.4 - 97.8 fL    MCH 33.3 (H) 27.0 - 33.0 pg    MCHC 34.5 32.3 - 36.5 g/dL    RDW 49.4 35.9 - 50.0 fL    Platelet Count 191 164 - 446 K/uL    MPV 10.0 9.0 - 12.9 fL    Neutrophils-Polys 71.30 44.00 - 72.00 %    Lymphocytes 19.30 (L) 22.00 - 41.00 %    Monocytes 5.40 0.00 - 13.40 %    Eosinophils 1.30 0.00 - 6.90 %    Basophils 0.90 0.00 - 1.80 %    Immature Granulocytes 1.80 (H) 0.00 - 0.90 %    Nucleated RBC 0.00 " 0.00 - 0.20 /100 WBC    Neutrophils (Absolute) 7.66 (H) 1.82 - 7.42 K/uL    Lymphs (Absolute) 2.07 1.00 - 4.80 K/uL    Monos (Absolute) 0.58 0.00 - 0.85 K/uL    Eos (Absolute) 0.14 0.00 - 0.51 K/uL    Baso (Absolute) 0.10 0.00 - 0.12 K/uL    Immature Granulocytes (abs) 0.19 (H) 0.00 - 0.11 K/uL    NRBC (Absolute) 0.00 K/uL   CMP    Collection Time: 03/02/25  7:35 AM   Result Value Ref Range    Sodium 142 135 - 145 mmol/L    Potassium 4.1 3.6 - 5.5 mmol/L    Chloride 108 96 - 112 mmol/L    Co2 15 (L) 20 - 33 mmol/L    Anion Gap 19.0 (H) 7.0 - 16.0    Glucose 130 (H) 65 - 99 mg/dL    Bun 19 8 - 22 mg/dL    Creatinine 0.97 0.50 - 1.40 mg/dL    Calcium 8.7 8.5 - 10.5 mg/dL    Correct Calcium 8.8 8.5 - 10.5 mg/dL    AST(SGOT) 23 12 - 45 U/L    ALT(SGPT) 14 2 - 50 U/L    Alkaline Phosphatase 57 30 - 99 U/L    Total Bilirubin 0.5 0.1 - 1.5 mg/dL    Albumin 3.9 3.2 - 4.9 g/dL    Total Protein 6.8 6.0 - 8.2 g/dL    Globulin 2.9 1.9 - 3.5 g/dL    A-G Ratio 1.3 g/dL   DIAGNOSTIC ALCOHOL    Collection Time: 03/02/25  7:35 AM   Result Value Ref Range    Diagnostic Alcohol <10.1 <10.1 mg/dL   ESTIMATED GFR    Collection Time: 03/02/25  7:35 AM   Result Value Ref Range    GFR (CKD-EPI) 79 >60 mL/min/1.73 m 2   URINE DRUG SCREEN    Collection Time: 03/02/25  9:32 AM   Result Value Ref Range    Amphetamines Urine Negative Negative    Barbiturates Negative Negative    Benzodiazepines Negative Negative    Cocaine Metabolite Negative Negative    Fentanyl, Urine Negative Negative    Methadone Negative Negative    Opiates Negative Negative    Oxycodone Negative Negative    Phencyclidine -Pcp Negative Negative    Propoxyphene Negative Negative    Cannabinoid Metab Negative Negative   URINALYSIS (UA)    Collection Time: 03/02/25  9:32 AM    Specimen: Urine   Result Value Ref Range    Color Yellow     Character Clear     Specific Gravity 1.017 <1.035    Ph 5.5 5.0 - 8.0    Glucose Negative Negative mg/dL    Ketones Negative Negative mg/dL     Protein Negative Negative mg/dL    Bilirubin Negative Negative    Urobilinogen, Urine 1.0 <=1.0 EU/dL    Nitrite Negative Negative    Leukocyte Esterase Negative Negative    Occult Blood Negative Negative    Micro Urine Req see below      *Note: Due to a large number of results and/or encounters for the requested time period, some results have not been displayed. A complete set of results can be found in Results Review.       I have independently interpreted this EKG    RADIOLOGY/PROCEDURES   I have independently interpreted the diagnostic imaging associated with this visit and am waiting the final reading from the radiologist.   My preliminary interpretation is as follows:   No focal consolidation seen on chest x-ray    CT head shows no intracranial hemorrhage or mass effect    Radiologist interpretation:  DX-CHEST-PORTABLE (1 VIEW)   Final Result         1. No acute cardiopulmonary abnormalities identified.                     CT-HEAD W/O   Final Result         1. No acute process in the brain evident.      2. No change chronic left globe calcifications.             COURSE & MEDICAL DECISION MAKING    ASSESSMENT, COURSE AND PLAN  Care Narrative: This is a 79-year-old male with history of dyslipidemia and COPD not on home O2 who had a first-time seizure in November and likely had a seizure today.  Patient has no residual focal neurologic deficits.  Patient reports generalized fatigue following what he also believes was a seizure.  Patient is scheduled for EEG this week per wife.    Chest x-ray ordered given patient's initial hypoxia and cough which patient reports is chronic.  Patient with history of COPD.    Paging neurology    9:11 AM  I discussed the case with Dr. Corona from neurology.  He has reviewed the patient's chart and noted that the patient did have a spot EEG following the first seizure in the fall and is scheduled for a 24-hour EEG this week.  The patient had seen Dr. Acosta who had recommended  starting antiepileptics after the first seizure but patient declined.  Dr. Corona recommends starting Keppra 750 mg twice daily.  No 24-hour EEG or repeat MRI required given today's is his second seizure.    Chest x-ray shows no focal consolidation.    Ambulatory pulse ox 94%.  Patient ambulated to the bathroom.    Patient will be discharged with return precautions and prescription for Keppra.  Keppra 750 mg ordered for here to be given prior to discharge.  Patient and wife understand and are amenable to this plan.    Patient's wife inquiring about when the Keppra might come up from pharmacy.  Ultimately, patient's wife requested that we send this to their regular [facility] pharmacy so she may pick it up on the way home.      DISPOSITION AND DISCUSSIONS  I have discussed management of the patient with the following physicians and KIARA's:    neurology    Escalation of care considered, and ultimately not performed:acute inpatient care management, however at this time, the patient is most appropriate for outpatient management    Barriers to care at this time, including but not limited to:  none .     FINAL DIAGNOSIS  1. Seizure (HCC)    2. Dehydration         Electronically signed by: Sandra Gage M.D., 3/2/2025 7:41 AM

## 2025-03-02 NOTE — ED NOTES
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 Has done quite well over the past three years but has had some mild episodes of pancreatitis not requiring hospitalization. Most recently has required hospitalization x2 for acute episodes of worsening abdominal pain, nausea, emesis, and elevated T Bili which approached 10 at one point. Surgical Oncology consulted for evaluation.    - Had a long discussion with Mr. Zamorano regarding his symptoms/pathology/and prognosis. Overall he looks quite good. Tolerating most foods and seems to be doing well nutritionally. These episodes seem to be unprovoked and I am unsure of what may have triggered his new onset symptoms.   - Definitive surgery for this condition would likely require total pancreatectomy (and also likely partial colectomy, splenectomy, partial gastrectomy, and small bowel resection) which comes with both significant acute and chronic issues.   - Patient would like to refrain from surgical intervention at this time which I agree with  - OK to proceed with colonoscopy tomorrow for evaluation of splenic flexure.  - If the colonoscopy is concerning for a colo-pancreatic fistula, he may benefit from diversion  - Slowly advance diet  - Continue Creon  - Multimodal pain control  - Surgical Oncology will continue to follow while inpatient. If colonoscopy is without significant findings I believe that this can be treated conservatively as an outpatient.   Ambulates to the bathroom, steady gait, urine sample sent to lab. Pt RA sat after walking to the bathroom was 91-94%, ERP aware.

## 2025-03-02 NOTE — DISCHARGE INSTRUCTIONS
Increase your fluid intake to avoid further dehydration    Return to the ER for pain, difficulty breathing, or other concerns    Take Keppra twice daily as prescribed for seizures    Follow-up with your primary care doctor and your neurologist this week

## 2025-03-02 NOTE — ED TRIAGE NOTES
BIB [facility] to rm [facility] from home  Chief Complaint   Patient presents with    Seizure     Possible seizure, unwitnessed     Pt's wife did not witness a seizure, but heard loud snoring from the bedroom and pt was nonverbal. Had a seizure about 6 weeks ago, was not placed on any anti-seizure meds, has seen a neurologist, but has not had an EEG yet. Pt is AO to self only, but can answer questions and follow commands. Chart up for ERP.

## 2025-03-06 ENCOUNTER — NON-PROVIDER VISIT (OUTPATIENT)
Dept: NEUROLOGY | Facility: MEDICAL CENTER | Age: 80
End: 2025-03-06
Attending: STUDENT IN AN ORGANIZED HEALTH CARE EDUCATION/TRAINING PROGRAM
Payer: MEDICARE

## 2025-03-06 DIAGNOSIS — R56.9 SEIZURE (HCC): ICD-10-CM

## 2025-03-06 PROCEDURE — 95700 EEG CONT REC W/VID EEG TECH: CPT | Performed by: STUDENT IN AN ORGANIZED HEALTH CARE EDUCATION/TRAINING PROGRAM

## 2025-03-06 PROCEDURE — 95708 EEG WO VID EA 12-26HR UNMNTR: CPT | Performed by: STUDENT IN AN ORGANIZED HEALTH CARE EDUCATION/TRAINING PROGRAM

## 2025-03-06 PROCEDURE — 95719 EEG PHYS/QHP EA INCR W/O VID: CPT | Performed by: STUDENT IN AN ORGANIZED HEALTH CARE EDUCATION/TRAINING PROGRAM

## 2025-03-06 NOTE — PROCEDURES
AMBULATORY ELECTROENCEPHALOGRAM REPORT      REFERRING PROVIDER:  Samina Acosta M.D.  [address] [address]  [place] [id number]  DOS: 03/06/2025   DURATION OF RECORDING: (23 hours and 11 minutes)    INDICATION:  Josh Pang 79 y.o. male presenting with  seizure(s)    CURRENT OUTPATIENT MEDICATION LIST:   Current Outpatient Medications   Medication Instructions    acetaminophen (TYLENOL) 325 mg, 2 TIMES DAILY    albuterol 108 (90 Base) MCG/ACT Aero Soln inhalation aerosol 2 Puffs, Inhalation, EVERY 6 HOURS PRN    aspirin (ASA) 81 mg, Oral, DAILY    atorvastatin (LIPITOR) 80 mg, Oral, NIGHTLY    Azelastine (ASTELIN) 137 mcg, Nasal, DAILY    celecoxib (CELEBREX) 200 MG Cap     doxazosin (CARDURA) 2 mg, Oral, 2 TIMES DAILY    famotidine (PEPCID) 20 mg, 2 TIMES DAILY    fluticasone (FLONASE) 50 MCG/ACT nasal spray 1 Spray, 2 TIMES DAILY PRN    fluticasone furoate-vilanterol (BREO ELLIPTA) 100-25 MCG/ACT AEROSOL POWDER, BREATH ACTIVATED 1 Puff, Inhalation, DAILY    levETIRAcetam (KEPPRA) 750 mg, Oral, 2 TIMES DAILY    montelukast (SINGULAIR) 10 mg, Oral, DAILY    tiotropium (SPIRIVA HANDIHALER) 18 MCG Cap 1 Capsule, Inhalation, DAILY       TECHNIQUE:   The EEG was set up and taken down by a Neurodiagnostic technologist who performed education to patient and staff.  A minimum but not limited to 23 electrodes and 23 channel recording was setup and performed by Neurodiagnostic technologist. Impedances, electrode integrity, and technical impressions were documented a minimum of every 2-24 hour period by a Neurodiagnostic Technologist and reviewed by Interpreting Physician.    DESCRIPTION OF THE RECORD:  During maximal wakefulness, the background was continuous and showed a 8 Hz posterior dominant rhythm.  Reactivity and state changes were present.  During drowsiness, theta/delta frequencies were seen.    Sleep was captured and was characterized by diffuse background delta/theta activity with a loss of myogenic  artifact.  N2 sleep transients in the form of sleep spindles and vertex waves were seen in the leads over the central regions.     ICTAL AND INTERICTAL FINDINGS:   1) Occasional runs of right frontal delta slowing  2) No epileptiform discharges  3) No seizures    ACTIVATION PROCEDURES:   Hyperventilation was performed by the patient for a total of 3 minutes. The technician performing the test noted good effort. This technique did not elicited any abnormalities on EEG.  and Intermittent Photic stimulation was performed in a stepwise fashion from 1 to 30 Hz and did not elicited any abnormalities on EEG.     EKG: Sampling of the EKG recording showed sinus rhythm    EVENTS:  Push button events and/or ambulatory diary events: none    INTERPRETATION:  Abnormal ambulatory EEG recording in the awake and drowsy/sleep state(s):  -Occasional runs of right frontal delta slowing, a finding which may be seen in the setting of focal cerebral dysfunction in this region, postictally, or other clinical context. Clinical and radiologic correlation recommended.   -No epileptiform discharges were seen.  -No seizures.   -Clinical Events: none    Note: this EEG does not exclude the possibility of epilepsy, which is a clinical diagnosis. Clinical correlation is recommended.     Samina Acosta M.D.   Diplomate, Neurology with Special Qualification in Epilepsy, American Board of Psychiatry and Neurology   of Clinical Neurology, [employer]  Level III Epilepsy Center, Department of Neurology at [facility]

## 2025-03-07 ENCOUNTER — NON-PROVIDER VISIT (OUTPATIENT)
Dept: NEUROLOGY | Facility: MEDICAL CENTER | Age: 80
End: 2025-03-07
Attending: STUDENT IN AN ORGANIZED HEALTH CARE EDUCATION/TRAINING PROGRAM
Payer: MEDICARE

## 2025-03-07 PROCEDURE — 99999 PR NO CHARGE: CPT | Performed by: STUDENT IN AN ORGANIZED HEALTH CARE EDUCATION/TRAINING PROGRAM

## 2025-03-19 ENCOUNTER — OFFICE VISIT (OUTPATIENT)
Dept: MEDICAL GROUP | Facility: MEDICAL CENTER | Age: 80
End: 2025-03-19
Payer: MEDICARE

## 2025-03-19 VITALS
SYSTOLIC BLOOD PRESSURE: 122 MMHG | DIASTOLIC BLOOD PRESSURE: 56 MMHG | OXYGEN SATURATION: 94 % | WEIGHT: 208 LBS | BODY MASS INDEX: 30.81 KG/M2 | TEMPERATURE: 98.6 F | HEIGHT: 69 IN | HEART RATE: 73 BPM

## 2025-03-19 DIAGNOSIS — J44.1 CHRONIC OBSTRUCTIVE PULMONARY DISEASE WITH ACUTE EXACERBATION (HCC): ICD-10-CM

## 2025-03-19 DIAGNOSIS — R56.9 SEIZURE (HCC): ICD-10-CM

## 2025-03-19 PROCEDURE — 99214 OFFICE O/P EST MOD 30 MIN: CPT | Performed by: STUDENT IN AN ORGANIZED HEALTH CARE EDUCATION/TRAINING PROGRAM

## 2025-03-19 PROCEDURE — 3078F DIAST BP <80 MM HG: CPT | Performed by: STUDENT IN AN ORGANIZED HEALTH CARE EDUCATION/TRAINING PROGRAM

## 2025-03-19 PROCEDURE — 3074F SYST BP LT 130 MM HG: CPT | Performed by: STUDENT IN AN ORGANIZED HEALTH CARE EDUCATION/TRAINING PROGRAM

## 2025-03-19 RX ORDER — LEVETIRACETAM 500 MG/1
500 TABLET ORAL 2 TIMES DAILY
Qty: 180 TABLET | Refills: 0 | Status: SHIPPED | OUTPATIENT
Start: 2025-03-19

## 2025-03-19 ASSESSMENT — ENCOUNTER SYMPTOMS
DIZZINESS: 0
WHEEZING: 0
WEIGHT LOSS: 0
PALPITATIONS: 0
CHILLS: 0
HEADACHES: 0
SHORTNESS OF BREATH: 0
FEVER: 0
VOMITING: 0
NAUSEA: 0

## 2025-03-19 ASSESSMENT — FIBROSIS 4 INDEX: FIB4 SCORE: 2.54

## 2025-03-19 NOTE — PROGRESS NOTES
"Subjective:     CC:     HPI:   Josh presents today with    PMH HLD, IFG, ?COPD ( last pft 2018 read normal), tobacco use, GERD, hep C report s/p treatment ( MR abdomen done in 2007 wo cirrhosis), seizure like episode  Specialist  Neurology     Patient presents for follow-up 11/2024 was admitted to the hospital had episode of seizure-like activity brain imaging without specific findings.  Follow-up with neurology underwent EEG.  He was admitted 3/2025 for another episode of seizure-like activity and neurology was consulted recommended Keppra 750 twice daily he has a follow-up/4/25.  Patient presents today for follow-up reports he has significant daytime fatigue with Keppra napping throughout the day and sleeping 3-hour more than usual.  No further seizure-like activity since ED visit.    Continues to smoke - down to 8 cigarettes, using patches, some associated bad dreams.  Chantix has night snyder    Health Maintenance:     ROS:  Review of Systems   Constitutional:  Negative for chills, fever and weight loss.   HENT:  Negative for hearing loss.    Respiratory:  Negative for shortness of breath and wheezing.    Cardiovascular:  Negative for chest pain and palpitations.   Gastrointestinal:  Negative for nausea and vomiting.   Genitourinary:  Negative for frequency and urgency.   Skin:  Negative for rash.   Neurological:  Negative for dizziness and headaches.       Objective:     Exam:  /56   Pulse 73   Temp 37 °C (98.6 °F) (Temporal)   Ht 1.753 m (5' 9\")   Wt 94.3 kg (208 lb)   SpO2 94%   BMI 30.72 kg/m²  Body mass index is 30.72 kg/m².    Physical Exam  Constitutional:       Appearance: Normal appearance.   Cardiovascular:      Rate and Rhythm: Normal rate and regular rhythm.   Pulmonary:      Effort: Pulmonary effort is normal.      Breath sounds: Normal breath sounds.   Musculoskeletal:      Cervical back: Normal range of motion and neck supple.   Neurological:      Mental Status: He is alert. "           Labs:     Assessment & Plan:     79 y.o. male with the following -     1. Seizure (HCC)  Chronic, stable  Patient is unable to tolerate Keppra 750 mg twice daily citing extreme fatigue and daytime sleepiness and associated insomnia.  Discussed with patient we will trial Keppra 500 mg twice daily.  He has a follow-up with neurology in 2 weeks.  He does not operate motor vehicle.  - levETIRAcetam (KEPPRA) 500 MG Tab; Take 1 Tablet by mouth 2 times a day.  Dispense: 180 Tablet; Refill: 0    2. Chronic obstructive pulmonary disease with acute exacerbation (HCC)  Chronic, stable report overall symptoms well-controlled on current inhalers.  Baade, Breo, albuterol as needed.  He continues to smoke about 8 cigarettes/day.  He is currently using nicotine patch replacement therapy however does remove it at night due to associated nightmare.  Previously did not tolerate Chantix due to nightmares      HCC Gap Form    Diagnosis to address: J44.1 - Chronic obstructive pulmonary disease with acute exacerbation (HCC)  Assessment and plan: Chronic, stable. Continue with current defined treatment plan: continues to use breo and spiriva with improvement. Follow-up at least annually.  Last edited 03/19/25 07:53 PDT by Oren Pisano M.D.             Return in about 3 months (around 6/19/2025) for Lab review, Med check.    Please note that this dictation was created using voice recognition software. I have made every reasonable attempt to correct obvious errors, but I expect that there are errors of grammar and possibly content that I did not discover before finalizing the note.

## 2025-03-21 ENCOUNTER — PATIENT OUTREACH (OUTPATIENT)
Dept: HEALTH INFORMATION MANAGEMENT | Facility: OTHER | Age: 80
End: 2025-03-21
Payer: MEDICARE

## 2025-03-21 DIAGNOSIS — J44.1 CHRONIC OBSTRUCTIVE PULMONARY DISEASE WITH ACUTE EXACERBATION (HCC): ICD-10-CM

## 2025-03-21 NOTE — LETTER
March 26, 2025        Josh Pang  [address]  [address]  [place] [address]        Dear Josh:    Thank you for the privilege of working with you to understand and self-manage your health.    I have been unable to make contact with you at my last three (3) outreach attempts. At this time, I will discharge you from Personal Care Management due to loss of contact. Please follow up with your provider(s) as scheduled.     In the event you continue to need [facility] services, please do not hesitate to call and we will continue working together. The Personal Care Management Team is available 5 days a week, Monday through Friday from 8:00 a.m. - 5:00 p.m. at [phone].     If you have any questions or concerns, please don't hesitate to call.        Sincerely,        Radha Sherman R.N.    Electronically Signed

## 2025-03-26 NOTE — PROGRESS NOTES
This RN has attempted to reach patient for the past 3 months with no return phone calls.  Patient will be discharged from the PCM program at this time.  Letter will be sent to patient informing him of the discharge.  Patient encouraged to call back if he would like to reenroll or if he has any questions.

## 2025-04-04 ENCOUNTER — TELEPHONE (OUTPATIENT)
Dept: NEUROLOGY | Facility: MEDICAL CENTER | Age: 80
End: 2025-04-04
Payer: MEDICARE

## 2025-04-04 ENCOUNTER — APPOINTMENT (OUTPATIENT)
Dept: NEUROLOGY | Facility: MEDICAL CENTER | Age: 80
End: 2025-04-04
Attending: STUDENT IN AN ORGANIZED HEALTH CARE EDUCATION/TRAINING PROGRAM
Payer: MEDICARE

## 2025-04-04 NOTE — TELEPHONE ENCOUNTER
Patient had appointment today that was rescheuled. Patient's wife states patient is very drowsy. Saw pcp who lowered his keppra to 500 mg from 750mg. Patient is still very drowsy made appointment for 04/08/2025.

## 2025-04-04 NOTE — TELEPHONE ENCOUNTER
Pt's appt was canceled per provider but he is concerned about a medication that he is currently taking. He didn't specify which medication but it is making him very tired and drowsy. He asked if the MA could call him to discuss medications and other options.

## 2025-04-07 ENCOUNTER — OFFICE VISIT (OUTPATIENT)
Dept: NEUROLOGY | Facility: MEDICAL CENTER | Age: 80
End: 2025-04-07
Attending: STUDENT IN AN ORGANIZED HEALTH CARE EDUCATION/TRAINING PROGRAM
Payer: MEDICARE

## 2025-04-07 VITALS
OXYGEN SATURATION: 93 % | WEIGHT: 209.66 LBS | HEIGHT: 69 IN | DIASTOLIC BLOOD PRESSURE: 56 MMHG | RESPIRATION RATE: 16 BRPM | HEART RATE: 69 BPM | BODY MASS INDEX: 31.05 KG/M2 | SYSTOLIC BLOOD PRESSURE: 138 MMHG

## 2025-04-07 DIAGNOSIS — R56.9 SEIZURE (HCC): ICD-10-CM

## 2025-04-07 DIAGNOSIS — Z91.89 AT RISK FOR OBSTRUCTIVE SLEEP APNEA: ICD-10-CM

## 2025-04-07 PROCEDURE — 99214 OFFICE O/P EST MOD 30 MIN: CPT | Performed by: STUDENT IN AN ORGANIZED HEALTH CARE EDUCATION/TRAINING PROGRAM

## 2025-04-07 PROCEDURE — 99212 OFFICE O/P EST SF 10 MIN: CPT | Performed by: STUDENT IN AN ORGANIZED HEALTH CARE EDUCATION/TRAINING PROGRAM

## 2025-04-07 PROCEDURE — 3078F DIAST BP <80 MM HG: CPT | Performed by: STUDENT IN AN ORGANIZED HEALTH CARE EDUCATION/TRAINING PROGRAM

## 2025-04-07 PROCEDURE — 3075F SYST BP GE 130 - 139MM HG: CPT | Performed by: STUDENT IN AN ORGANIZED HEALTH CARE EDUCATION/TRAINING PROGRAM

## 2025-04-07 RX ORDER — LEVETIRACETAM 750 MG/1
750 TABLET, FILM COATED, EXTENDED RELEASE ORAL DAILY
Qty: 90 TABLET | Refills: 1 | Status: SHIPPED | OUTPATIENT
Start: 2025-04-07 | End: 2025-10-04

## 2025-04-07 ASSESSMENT — FIBROSIS 4 INDEX: FIB4 SCORE: 2.54

## 2025-04-07 NOTE — PROGRESS NOTES
[facility]  Follow up visit    Patient name: Josh Pang  YOB: 1945  MRN: [id number]  Date of visit: 4/7/2025     Background:    Josh Pang is a 79 y.o. man being seen in follow up for seizures. Last/initial visit 1/10/2025.     Details of history:    Patient was in his usual state of health the day prior to first lifetime seizure on November 3, 2024.      The patient and his wife were sleeping. His wife noted that he had some milder body twitching, then it became more pronounced. He had generalized convulsions. She called 911. Convulsions lasted approximately 1 minute. He had post-ictal confusion, remembers brief periods of transport to the hospital, then recalls being in the hospital being examined by a doctor.  No urinary/bowel incontinence or tongue biting.     No clear provoking factors, however he did take a small amount of his wife's methadone.     Wife takes 59 mg of methadone daily. He prepares the bottles weekly. He rinsed out one of her bottles of methadone a few days prior to having the seizure.  He denies any additional use in the days leading up to the seizure aside from that 1 time.     No hx of episodic or transient neurologic symptoms.  No episodic staring spells, confusion, extremity jerking or shaking, unexplained numbness, or other symptoms concerning for aura.     He was found to have 80% carotid stenosis on the right. He takes aspirin 81 mg daily. He also takes Atorvastatin which was increased to 80 mg daily.  He denies a history of episodes of left hemibody weakness or numbness, or other symptoms concerning for previous TIA.     He tried Chantix for smoking cessation about 10 years ago. This did not work and gave him overly vivid dreams.      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        Driving: he does not drive     Risk factors for epileptic seizures:  Normal birth history, no complications, born at term. Patient had  a twin sister that  shortly after birth.  No history of significant head trauma.   No history of stroke or meningitis.  No family history of epilepsy. Brother had post-traumatic epilepsy related to falling off of a milk truck.  No febrile seizures.      Social history: lives with his wife. He is a daily tobacco smoker. He has decreased intake to 1/2 PPD since incident. He has lived in [place] for 30 years. He is retired. 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 No recreational drug use. Remote history of IVDU.          Interval history:  Patient had a second lifetime seizure on 3/2/2025. Onset was unwitnessed. He was in bed that morning and his wife heard loud snoring in the bedroom, went to check on him and noted that he was not able to speak. He was taken to the ED.  At that time he had completed routine EEG which was normal and was scheduled for a 24h ambulatory EEG later that week.     He was started on Keppra 750 mg BID at that time. He followed up with his PCP for excessive fatigue at that dose, decreased to 500 mg BID which is still causing fatigue.     The 24h EEG was notable for occasional runs of R frontal delta slowing.       STOP-BANG score 4 (high risk for HUDSON)    Sleep: longstanding history of poor sleep at night. He naps a couple of times a day then can't sleep well at night. He has previously been prescribed Ambien for sleep and did not like how it made him feel. Later tried Temazepam and several others including melatonin. Each was ineffective.     Mood:      1/10/2025     8:20 AM 10/23/2024    12:58 PM 6/10/2024     9:20 AM 1/15/2024     2:00 PM 3/15/2023     8:00 AM   PHQ-9 Screening   Little interest or pleasure in doing things 1 - several days 0 - not at all 0 - not at all 0 - not at all 0 - not at all   Feeling down, depressed, or hopeless 0 - not at all 0 - not at all 0 - not at all 0 - not at all 0 - not at all   Trouble falling or staying asleep, or sleeping too much 1  - several days       Feeling tired or having little energy 1 - several days       Poor appetite or overeating 0 - not at all       Feeling bad about yourself - or that you are a failure or have let yourself or your family down 0 - not at all       Trouble concentrating on things, such as reading the newspaper or watching television 0 - not at all       Moving or speaking so slowly that other people could have noticed. Or the opposite - being so fidgety or restless that you have been moving around a lot more than usual 0 - not at all       Thoughts that you would be better off dead, or of hurting yourself in some way 0 - not at all       PHQ-2 Total Score 1 0 0 0 0   PHQ-9 Total Score 3                     Current Medications:   Current Outpatient Medications:     levETIRAcetam (KEPPRA) 500 MG Tab, Take 1 Tablet by mouth 2 times a day., Disp: 180 Tablet, Rfl: 0    montelukast (SINGULAIR) 10 MG Tab, Take 1 tablet by mouth once daily, Disp: 90 Tablet, Rfl: 1    atorvastatin (LIPITOR) 80 MG tablet, Take 1 Tablet by mouth every evening., Disp: 100 Tablet, Rfl: 3    celecoxib (CELEBREX) 200 MG Cap, , Disp: , Rfl:     aspirin (ASA) 81 MG Chew Tab chewable tablet, Chew 1 Tablet every day., Disp: 100 Tablet, Rfl: 1    acetaminophen (TYLENOL) 325 MG Tab, Take 325 mg by mouth 2 times a day., Disp: , Rfl:     fluticasone (FLONASE) 50 MCG/ACT nasal spray, Administer 1 Spray into affected nostril(S) 2 times a day as needed., Disp: , Rfl:     famotidine (PEPCID) 20 MG Tab, Take 20 mg by mouth 2 times a day., Disp: , Rfl:     Azelastine (ASTELIN) 137 MCG/SPRAY Solution, Administer 1 Spray into affected nostril(S) every day., Disp: 30 mL, Rfl: 11    albuterol 108 (90 Base) MCG/ACT Aero Soln inhalation aerosol, Inhale 2 Puffs every 6 hours as needed for Shortness of Breath., Disp: 18 g, Rfl: 11    fluticasone furoate-vilanterol (BREO ELLIPTA) 100-25 MCG/ACT AEROSOL POWDER, BREATH ACTIVATED, Inhale 1 Puff every day., Disp: 180 Each,  Rfl: 3    tiotropium (SPIRIVA HANDIHALER) 18 MCG Cap, Place 1 Capsule into inhaler and inhale every day., Disp: 30 Capsule, Rfl: 3    doxazosin (CARDURA) 2 MG Tab, Take 1 Tablet by mouth 2 times a day., Disp: 180 Tablet, Rfl: 3    Allergies:   Allergies   Allergen Reactions    Azithromycin      rash         Physical Exam:   Ambulatory Vitals  Vitals:    04/07/25 1418   BP: 138/56   Pulse: 69   Resp: 16   SpO2: 93%       Constitutional: Well-developed, well-nourished, good hygiene. Appears stated age.  Respiratory: normal respiratory effort  Skin: Warm, dry, intact. No rashes observed.  Neurologic:   Mental Status: Awake, alert, oriented x 4.   Speech: Fluent with normal prosody.   Memory: Able to recall recent and remote events accurately.    Concentration: Attentive. Able to focus on history.   Fund of Knowledge: Appropriate.   Cranial Nerves:    CN II: PERRL    CN III, IV, VI: EOMI without nystagmus    CN VII: No facial asymmetry    CN VIII: Hearing intact to voice   Motor: moving all extremities fully and equally    Gait: ambulates steadily without assistive device   Movements: No resting tremors or abnormal movements observed.       Studies:      Labs reviewed      Imaging:     EEG Results:   Ambulatory EEG 3/6/2025:  INTERPRETATION:  Abnormal ambulatory EEG recording in the awake and drowsy/sleep state(s):  -Occasional runs of right frontal delta slowing, a finding which may be seen in the setting of focal cerebral dysfunction in this region, postictally, or other clinical context. Clinical and radiologic correlation recommended.   -No epileptiform discharges were seen.  -No seizures.   -Clinical Events: none     Note: this EEG does not exclude the possibility of epilepsy, which is a clinical diagnosis. Clinical correlation is recommended.           Assessment/Plan:   Josh Pang is a 79 y.o. man with a history of epilepsy being seen in follow up. He has now had two lifetime seizures, the first on 11/3/2024  and the second on 3/2/2025. He was started on Keppra 750 mg BID after the second seizure and had an ambulatory EEG later that week which did not show epileptiform discharges.     He poorly tolerated the 750 mg BID dose and PCP decreased dose to 500 mg BID which is still causing fatigue. Discussed options. We will switch to Keppra  mg nightly.  While awaiting insurance approval patient may take 250 mg in AM and 500 mg in PM. He has sleep disturbance which is chronic and is at high risk for HUDSON based on STOP-BANG score. Referral to pulmonology/sleep medicine placed.     Patient has not been driving for several years.     Counseled patient to take vitamin D.     Counseled regarding SUDEP, low risk since he has started anti-seizure medication.     Counseled regarding medication side effects, factors that lower seizure threshold, seizure safety, and other pertinent topics for a new diagnosis of epilepsy.     1. Seizure (HCC)  - Levetiracetam 750 MG TABLET SR 24 HR; Take 750 mg by mouth every day for 180 days.  Dispense: 90 Tablet; Refill: 1    2. At risk for obstructive sleep apnea  - Referral to Pulmonary and Sleep Medicine      Patient instructions:    Please let our office know if you have any changes in your seizure frequency and/characteristics. Otherwise, please keep the diary of your events and bring it with you at the time of your next follow up visit with our office.     Please take vitamin D3 [id number] international units daily.     Please note that the following might precipitate seizures: missed doses of antiseizure medications, being sick with fever, stress, fatigue, sleep deprivation, not eating regularly, not drinking enough water, drinking too much alcohol, stopping alcohol suddenly if you are currently using it on a regular/daily basis, and/or using recreational drugs, among others. Benadryl, over the counter cold medication (sudafed, Tylenol PM, other nighttime cold medication).     Please note  that the following might lead to an injury, potentially a life-threatening injury, in case you have a seizure and/or lose awareness while:   - being in a large body of water by yourself, such as bath, pool, lake, ocean, among others (risk of drowning)   - being on unprotected heights (risk of fall)   - being around and/or operating heavy machinery (risk of injury)   - being around open fire/hot surfaces (risk of burns)   - any other activities/circumstances, in which if you lose awareness, you might injure yourself and/or others.    Please call for help (crisis line and/or 911) in case you have thoughts of harming yourself and/or others.    Please abstain from driving until further notice.    ------------------------------------------------------------------------------------------  Instructions for your family/caregivers:  Please call 911 if the patient has a seizure longer than 2-3 minutes, if seizures are back to back without him recovering to his baseline, or he does not start recovering within 5-10 minutes after the seizure stops. During the seizure - please turn him on his side, please make sure his head is protected (for example, you should put a pillow under his head, if one is available), and please do not put anything in his mouth.   -------------------------------------------------------------------------------------------    It is important that your seizures are well controlled and you have none or have them rarely. In addition to avoiding injury related to breakthrough seizures, frequent seizures increase risk of SUDEP (sudden unexpected death in epilepsy), where a person goes into a seizure and then never wakes up - this is a rare complication of seizure disorder; one of the best available ways to prevent it is to control your seizures well.     Due to the high volume of patients we are trying to help, your physician will not be able to respond by phone or in MyChart to your routine concerns between  appointments.  This does not reflect a lack of interest or concern for you or your diagnosis.  Please bring these questions and concerns to your appointment where your physician can answer.  Please relay more pressing concerns to our office, either via MyChart, or by phone; if not able to reach us please visit nearby Urgent Care Center or Emergency Department.  If any emergent medical needs, please seek emergent medical help and/or call 911.    Please note that we are not able to fill out paperwork that might be related to your work, utility company, disability, and/or driving, among others, in between the visits.  Please schedule a dedicated appointment to address your paperwork, so we can do that in a timely manner.  This is not due to lack of concern or interest for your disease-related work/administrative problems, but to make sure that we provide the best possible care and to fill out your paperwork in a correct and timely manner.    Thank you for entrusting your neurological care to [facility] and we look forward to continuing to serve you.       Follow up in approximately 8 weeks.     Samina Acosta M.D.   Diplomate, Neurology with Special Qualification in Epilepsy, American Board of Psychiatry and Neurology   of Clinical Neurology, [facility] of [facility]      During today's encounter we discussed available treatment options and their individual side effect profiles. Total encounter time caring for patient today 35 minutes.

## 2025-04-07 NOTE — PATIENT INSTRUCTIONS
Please let our office know if you have any changes in your seizure frequency and/characteristics. Otherwise, please keep the diary of your events and bring it with you at the time of your next follow up visit with our office.     Please take vitamin D3 [id number] international units daily.     Please note that the following might precipitate seizures: missed doses of antiseizure medications, being sick with fever, stress, fatigue, sleep deprivation, not eating regularly, not drinking enough water, drinking too much alcohol, stopping alcohol suddenly if you are currently using it on a regular/daily basis, and/or using recreational drugs, among others. Benadryl, over the counter cold medication (sudafed, Tylenol PM, other nighttime cold medication).     Please note that the following might lead to an injury, potentially a life-threatening injury, in case you have a seizure and/or lose awareness while:   - being in a large body of water by yourself, such as bath, pool, lake, ocean, among others (risk of drowning)   - being on unprotected heights (risk of fall)   - being around and/or operating heavy machinery (risk of injury)   - being around open fire/hot surfaces (risk of burns)   - any other activities/circumstances, in which if you lose awareness, you might injure yourself and/or others.    Please call for help (crisis line and/or 911) in case you have thoughts of harming yourself and/or others.    Please abstain from driving until further notice.    ------------------------------------------------------------------------------------------  Instructions for your family/caregivers:  Please call 911 if the patient has a seizure longer than 2-3 minutes, if seizures are back to back without him recovering to his baseline, or he does not start recovering within 5-10 minutes after the seizure stops. During the seizure - please turn him on his side, please make sure his head is protected (for example, you should put a  pillow under his head, if one is available), and please do not put anything in his mouth.   -------------------------------------------------------------------------------------------    It is important that your seizures are well controlled and you have none or have them rarely. In addition to avoiding injury related to breakthrough seizures, frequent seizures increase risk of SUDEP (sudden unexpected death in epilepsy), where a person goes into a seizure and then never wakes up - this is a rare complication of seizure disorder; one of the best available ways to prevent it is to control your seizures well.     Due to the high volume of patients we are trying to help, your physician will not be able to respond by phone or in [employer] to your routine concerns between appointments.  This does not reflect a lack of interest or concern for you or your diagnosis.  Please bring these questions and concerns to your appointment where your physician can answer.  Please relay more pressing concerns to our office, either via [employer], or by phone; if not able to reach us please visit nearby Urgent Care Center or Emergency Department.  If any emergent medical needs, please seek emergent medical help and/or call 911.    Please note that we are not able to fill out paperwork that might be related to your work, utility company, disability, and/or driving, among others, in between the visits.  Please schedule a dedicated appointment to address your paperwork, so we can do that in a timely manner.  This is not due to lack of concern or interest for your disease-related work/administrative problems, but to make sure that we provide the best possible care and to fill out your paperwork in a correct and timely manner.    Thank you for entrusting your neurological care to [facility] and we look forward to continuing to serve you.

## 2025-04-30 ENCOUNTER — TELEPHONE (OUTPATIENT)
Dept: MEDICAL GROUP | Facility: MEDICAL CENTER | Age: 80
End: 2025-04-30
Payer: MEDICARE

## 2025-05-01 NOTE — TELEPHONE ENCOUNTER
Kaden Pisano,  A  came in requesting cardiology referral for this pt, please advise.  Thank you.  Vida Ramos, Med Ass't

## 2025-05-02 DIAGNOSIS — I73.9 PERIPHERAL ARTERIAL DISEASE (HCC): ICD-10-CM

## 2025-05-02 DIAGNOSIS — I65.21 STENOSIS OF RIGHT CAROTID ARTERY: ICD-10-CM

## 2025-05-02 DIAGNOSIS — E78.2 MIXED HYPERLIPIDEMIA: ICD-10-CM

## 2025-05-02 DIAGNOSIS — Z72.0 TOBACCO USE: ICD-10-CM

## 2025-05-02 DIAGNOSIS — E66.9 OBESITY (BMI 30-39.9): ICD-10-CM

## 2025-05-02 DIAGNOSIS — R73.9 HYPERGLYCEMIA: ICD-10-CM

## 2025-05-02 NOTE — PROGRESS NOTES
Referral to vascular medicine for carotid stenosis and other vascular issues placed.     Samina Acosta M.D.   Epileptologist/Neurologist

## 2025-05-12 ENCOUNTER — TELEPHONE (OUTPATIENT)
Dept: HEALTH INFORMATION MANAGEMENT | Facility: OTHER | Age: 80
End: 2025-05-12
Payer: MEDICARE

## 2025-05-15 ENCOUNTER — OFFICE VISIT (OUTPATIENT)
Dept: VASCULAR LAB | Facility: MEDICAL CENTER | Age: 80
End: 2025-05-15
Attending: FAMILY MEDICINE
Payer: MEDICARE

## 2025-05-15 VITALS
DIASTOLIC BLOOD PRESSURE: 63 MMHG | BODY MASS INDEX: 30.66 KG/M2 | HEIGHT: 69 IN | HEART RATE: 63 BPM | SYSTOLIC BLOOD PRESSURE: 161 MMHG | WEIGHT: 207 LBS

## 2025-05-15 DIAGNOSIS — I73.9 PAD (PERIPHERAL ARTERY DISEASE) (HCC): ICD-10-CM

## 2025-05-15 DIAGNOSIS — F17.200 TOBACCO USE DISORDER: ICD-10-CM

## 2025-05-15 DIAGNOSIS — I10 HYPERTENSION, UNSPECIFIED TYPE: ICD-10-CM

## 2025-05-15 DIAGNOSIS — J44.1 CHRONIC OBSTRUCTIVE PULMONARY DISEASE WITH ACUTE EXACERBATION (HCC): ICD-10-CM

## 2025-05-15 DIAGNOSIS — I67.9 SMALL VESSEL DISEASE, CEREBROVASCULAR: ICD-10-CM

## 2025-05-15 DIAGNOSIS — E78.5 DYSLIPIDEMIA: ICD-10-CM

## 2025-05-15 DIAGNOSIS — I65.21 RIGHT-SIDED EXTRACRANIAL CAROTID ARTERY STENOSIS: Primary | ICD-10-CM

## 2025-05-15 DIAGNOSIS — I70.203 BILATERAL FEMORAL ARTERY STENOSIS (HCC): ICD-10-CM

## 2025-05-15 DIAGNOSIS — G31.9 ACQUIRED CEREBRAL ATROPHY (HCC): ICD-10-CM

## 2025-05-15 PROCEDURE — 99204 OFFICE O/P NEW MOD 45 MIN: CPT | Performed by: FAMILY MEDICINE

## 2025-05-15 PROCEDURE — G2211 COMPLEX E/M VISIT ADD ON: HCPCS | Performed by: FAMILY MEDICINE

## 2025-05-15 PROCEDURE — 99212 OFFICE O/P EST SF 10 MIN: CPT | Performed by: FAMILY MEDICINE

## 2025-05-15 RX ORDER — TELMISARTAN 20 MG/1
20 TABLET ORAL DAILY
Qty: 100 TABLET | Refills: 3 | Status: SHIPPED | OUTPATIENT
Start: 2025-05-15

## 2025-05-15 ASSESSMENT — ENCOUNTER SYMPTOMS
BLOOD IN STOOL: 0
ORTHOPNEA: 0
SEIZURES: 1
SPUTUM PRODUCTION: 0
BRUISES/BLEEDS EASILY: 0
WHEEZING: 0
SENSORY CHANGE: 0
PALPITATIONS: 0
DIZZINESS: 0
HEADACHES: 0
TREMORS: 0
PND: 0
FEVER: 0
COUGH: 0
FOCAL WEAKNESS: 0
HEMOPTYSIS: 0
CHILLS: 0
TINGLING: 0
SHORTNESS OF BREATH: 0
CLAUDICATION: 0
SPEECH CHANGE: 0
WEAKNESS: 0

## 2025-05-15 ASSESSMENT — FIBROSIS 4 INDEX: FIB4 SCORE: 2.54

## 2025-05-15 NOTE — PROGRESS NOTES
INITIAL VASCULAR MEDICINE CLINIC VISIT  05/15/25     Josh Pang is a 79 y.o. male referred for vascular medicine eval/mgmt of PAD, carotid dz, est 5/2025   Referring provider: Samina Acosta M.D. (neurology)    Subjective      Initial visit history: seen by neurology 5/2/25 for sz d/o, noted to have prior 80% R carotid stenosis.  No hx of CVA/TIA reported.  No other new focal neuro s/s.    Active smoker  No other sx.    Carotid artery disease:  no current sx, no prior CVA    LOWER EXTREMITY PAD:  denies current CLTI including rest pain, nonhealing leg wounds, cold extremities, coloration changes  Pain-free walking distance: no limits   Maximum walking distance, prior to stopping due to symptoms: n/a   How long until pain subsides with rest: n/a   Pertinent PAD pmxh:  Initial visit hx: known hx of PAD, reports unclear knowledge of locations of stenoses, no prior vasc surg or other treatment that he can recall   Tobacco hx:  reports that he has been smoking cigarettes. He started smoking about 59 years ago. He has a 29.7 pack-year smoking history. He has never used smokeless tobacco.  Prior imaging studies:  Yes, Details: PRASNATH 2020  Current/ prior treatment:    Prescribed Exercise therapy: No   GDMT: partial    Surg Interventions: No     HTN: Stable, tolerating meds with good adherence, Home BP log: not checking     HLD: Stable, current treatment: High intensity statin - tolerating, good adherence     Dysglycemia: no    Antithrombotic tx: ASA 81mg - no hx of bleeding      Patient Active Problem List    Diagnosis Date Noted    Bilateral femoral artery stenosis (HCC) 05/15/2025    Small vessel disease, cerebrovascular 05/15/2025    Acquired cerebral atrophy (HCC) 05/15/2025    Tobacco use disorder 05/15/2025    Chronic obstructive pulmonary disease with acute exacerbation (HCC) 03/19/2025    Torticollis 11/04/2024    Methadone use 11/04/2024    Seizure-like activity (HCC) 11/03/2024    Right-sided extracranial  "carotid artery stenosis 11/03/2024    Financial difficulties 06/10/2024    Murmur, cardiac 04/24/2024    Chronic congestion of paranasal sinus 04/24/2024    Environmental and seasonal allergies 04/24/2024    Tobacco use 02/10/2022    Hyperglycemia 09/16/2021    Osteoarthritis of right hip 09/16/2021    Obesity (BMI 30-39.9) 09/17/2018    Irritable bowel syndrome with diarrhea 05/31/2018    BPH (benign prostatic hyperplasia) 05/02/2012    COPD (chronic obstructive pulmonary disease) ([facility]) 05/02/2012    Lumbosacral radiculopathy 05/23/2011    Hyperlipidemia 12/21/2010    PAD (peripheral artery disease) ([facility]) 09/22/2010    GERD (gastroesophageal reflux disease) 08/18/2009      Past Surgical History[1]   Family History   Problem Relation Age of Onset    Diabetes Mother     Diabetes Father     Cancer Neg Hx       Medications Ordered Prior to Encounter[2]   Allergies[3]     Social History[4]  DIET AND EXERCISE:  Weight Change:stable   Diet: common adult  Exercise: no regular exercise program     Review of Systems   Constitutional:  Negative for chills, fever and malaise/fatigue.   HENT:  Negative for nosebleeds.    Respiratory:  Negative for cough, hemoptysis, sputum production, shortness of breath and wheezing.    Cardiovascular:  Negative for chest pain, palpitations, orthopnea, claudication, leg swelling and PND.   Gastrointestinal:  Negative for blood in stool and melena.   Genitourinary:  Negative for hematuria.   Neurological:  Positive for seizures (on meds). Negative for dizziness, tingling, tremors, sensory change, speech change, focal weakness, weakness and headaches.   Endo/Heme/Allergies:  Does not bruise/bleed easily.          Objective    Vitals:    05/15/25 0758 05/15/25 0801   BP: (!) 164/67 (!) 161/63   BP Location: Left arm Left arm   Patient Position: Sitting Sitting   BP Cuff Size: Small adult Small adult   Pulse: 76 63   Weight: 93.9 kg (207 lb)    Height: 1.753 m (5' 9\")       BP Readings from Last 4 " "Encounters:   05/15/25 (!) 161/63   04/07/25 138/56   03/19/25 122/56   03/02/25 103/61      Body mass index is 30.57 kg/m².   Wt Readings from Last 4 Encounters:   05/15/25 93.9 kg (207 lb)   04/07/25 95.1 kg (209 lb 10.5 oz)   03/19/25 94.3 kg (208 lb)   03/02/25 97.1 kg (214 lb)      Physical Exam  Constitutional:       General: He is not in acute distress.     Appearance: Normal appearance. He is not diaphoretic.   HENT:      Head: Normocephalic and atraumatic.   Eyes:      Conjunctiva/sclera: Conjunctivae normal.   Cardiovascular:      Rate and Rhythm: Normal rate and regular rhythm.      Pulses:           Carotid pulses are 2+ on the right side and 2+ on the left side.       Radial pulses are 2+ on the right side and 2+ on the left side.        Posterior tibial pulses are 1+ on the right side and 1+ on the left side.      Heart sounds: Normal heart sounds.      Comments: Distal ext warm, pink.  No wounds, cyanosis or ruborous discoloration  Pulmonary:      Effort: Pulmonary effort is normal.      Breath sounds: Normal breath sounds.   Musculoskeletal:      Right lower leg: No edema.      Left lower leg: No edema.   Skin:     General: Skin is warm and dry.   Neurological:      Mental Status: He is alert and oriented to person, place, and time.      Cranial Nerves: No cranial nerve deficit.      Gait: Gait is intact.   Psychiatric:         Mood and Affect: Mood and affect normal.          DATA REVIEW    Lab Results   Component Value Date/Time    CHOLSTRLTOT 127 07/13/2023 06:39 AM    LDL 66 07/13/2023 06:39 AM    HDL 37 (A) 07/13/2023 06:39 AM    TRIGLYCERIDE 121 07/13/2023 06:39 AM       Lab Results   Component Value Date/Time    LDL 66 07/13/2023 06:39 AM    LDL 88 06/02/2022 06:29 AM    LDL 68 03/30/2021 11:30 AM     (H) 09/28/2017 06:11 AM    LDL 69 05/15/2012 07:54 AM       No results found for: \"LIPOPROTA\"   No results found for: \"APOB\"   No results found for: \"CRPHIGHSEN\"      Lab Results " "  Component Value Date/Time    SODIUM 142 2025 07:35 AM    POTASSIUM 4.1 2025 07:35 AM    CHLORIDE 108 2025 07:35 AM    CO2 15 (L) 2025 07:35 AM    GLUCOSE 130 (H) 2025 07:35 AM    BUN 19 2025 07:35 AM    CREATININE 0.97 2025 07:35 AM    CREATININE 1.07 05/15/2012 07:54 AM    BUNCREATRAT 13 05/15/2012 07:54 AM    GLOMRATE >59 2010 08:45 AM     Lab Results   Component Value Date/Time    ALKPHOSPHAT 57 2025 07:35 AM    ASTSGOT 23 2025 07:35 AM    ALTSGPT 14 2025 07:35 AM    TBILIRUBIN 0.5 2025 07:35 AM       Lab Results   Component Value Date/Time    HBA1C 5.8 (H) 2023 06:39 AM    HBA1C 5.6 2022 06:29 AM       No results found for: \"MICROALBCALC\", \"MALBCRT\", \"MALBEXCR\", \"FXHRIT54\", \"MICROALBUR\", \"MICRALB\", \"UMICROALBUM\", \"MICROALBTIM\"        IMAGIN Carotid    Antegrade flow, bilateral vertebral arteries.    Moderate right cervical internal carotid artery stenosis (50-69%).    Mild left cervical internal carotid artery stenosis (<50%).      PRASANTH    1.  Abnormal bilateral PRASANTH's consistent with moderate arterial    insufficiency.                  RIGHT    Waveform            Systolic BPs (mmHg)                              149           Brachial   Triphasic                                Common Femoral   Monophasic                 89            Posterior Tibial   Monophasic                 92            Dorsalis Pedis                                            Peroneal                              0.62          PRASANTH                        LEFT   Waveform        Systolic BPs (mmHg)                              137           Brachial   Triphasic                                Common Femoral   Monophasic                 92            Posterior Tibial   Monophasic                 111           Dorsalis Pedis                                            Peroneal                              0.74          PRASANTH     BLE art duplex    " 1.  Chronic occlusion of the Right proximal/mid SFA with reconstitution of    flow in the distal SFA.    2.  Chronic occlusion of the proximal Left SFA with reconstitution in the    mid SFA.    11/2024 CTA head   No significant stenosis, acute arterial occlusion, or aneurysm identified.    11/2024 CTA neck  FINDINGS:  Vasculature: There is an 80% analysis of the proximal right ICA. No additional carotid stenosis identified. Bilateral vertebral arteries are patent. Right vertebral artery is dominant.   IMPRESSION:   80% proximal right ICA stenosis. Otherwise, negative.    11/2024 MR brain   1. Age-related cerebral atrophy.   2. Mild periventricular and juxtacortical white matter changes consistent with chronic microvascular ischemic gliosis.   3. No evidence of mesial temporal sclerosis, cortical dysplasia, or heterotopic gray matter.      PROCEDURES: none          Medical Decision Making:  Today's Assessment / Status / Plan:     1. Right-sided extracranial carotid artery stenosis  Referral to Vascular Surgery      2. PAD (peripheral artery disease) (HCC)  US-EXTREMITY ARTERY LOWER BILAT W/PRASANTH (COMBO)    CRP HIGH SENSITIVE (CARDIAC)      3. Bilateral femoral artery stenosis (HCC)  US-EXTREMITY ARTERY LOWER BILAT W/PRASANTH (COMBO)      4. Small vessel disease, cerebrovascular        5. Acquired cerebral atrophy (HCC)        6. Chronic obstructive pulmonary disease with acute exacerbation (HCC)        7. Tobacco use disorder        8. Hypertension, unspecified type  telmisartan (MICARDIS) 20 MG tablet    CBC WITHOUT DIFFERENTIAL    MICROALBUMIN CREAT RATIO URINE      9. Dyslipidemia  APOLIPOPROTEIN B    CRP HIGH SENSITIVE (CARDIAC)    Lipoprotein (a)    Lipid Profile         Established CVD:      1) Carotid artery disease, R severe - asymptomatic, no prior CVA/TIA   - clearly tob, HTN, athero mediated   - severe stenosis carries 5yr stroke risk for up to 15% with reduction to <0.5%/yr with CEA and med mgmt    Surveillance  "  2019 duplex=   R mod / L mild   112/024 CTA = R 80% / L mild   Plan:  - at initial visit provided [facility] pt education handout on carotid artery dz   - continue med mgmt  - monitor for stroke-like s/s and get immediate attention   - ref to vasc surg for CEA evaluation     2) LOWER EXTREMITY PAD (femoropopliteal) - chronic bilat SFA occlusions -  asymptomatic   - No indications of chronic limb threatening ischemia (CLTI) changes or hx of lifestyle-limiting symptoms   - PAD \"risk-amplifiers\": 75+ years old, ongoing tobacco use, polyvascular disease (2+ vascular beds), and microvascular disease (eg. retinopathy, neuropathy, nephropathy)  - Alex class:  I  - Change in Pain-free walking distance: N\A  - Change in Maximum walking distance: N\A  - change in 6MWT: N\A   Surveillance:  2021 PRASANTH = moderate reduction, bilat SFA occlusions with distal recon  Plan:  - as initial visit provided educ handouts on PAD, CLTI, exercise therapy  - as reviewed with patient, evidenced-based standard of care includes risk factor reduction, med mgmt, and exercise therapy with limited need for surgical intervention in majority of patients if there is CLTI or severe lifestyle limiting symptoms after >6mo of standard therapy   Lifestyle tx:  - continue lifestyle measures and tobacco cessation and/or complete avoidance strongly encouraged   - start/continue structured exercise therapy  - monitor change in pain-free and total walking distance to monitor progress  - consider 6MWT for formal progress measurement   Med tx:  - continue intensive med mgmt   - see antithrombotic plan below  - consider cilostazol 50 - 100mg BID if progressive claudication (only if no HF)  Imaging/surveillance   - check PRASANTH/art duplex now then annually   - consider CTA Ao with run-off   Other tx:  - may require eval for revasc with vasc surg if no response or worsening functionally impairing symptoms over time     3) CEREBROVASCULAR SMALL VESSEL DISEASE (CSVD) w/o hx " "of lacunar infarcts, microbleeds, ICH (per MR 2024 ) -  asymptomatic   - noted per prior neuroimaging, often termed \"chronic microvascular disease\"  - can manifest as mild cognitive dysfunction, dementia, mood disorders, motor and gait dysfunction, and urinary incontinence.   - It is a significant contributor to vascular cognitive impairment and dementia, accounting for a substantial proportion of these cases   - AHA/ASA highlight that CSVD accounts for 20-30% of ischemic strokes, generally as lacunar stroke syndromes and spontaneous ICH  Plan:  - avoid tobacco, control BP, continue antiplat therapy   - in general, no specific surveillance needed, unless new focal neuro s/s occur  - seek prompt attention if acute focal neuro s/s      BLOOD PRESSURE CONTROL   - stage 2 baseline with wide pulse pressure - indicative of worse overall prognosis due to arteriosclerosis  - PAD therapy should include RAASi agent   Office BP goal per ACC/AHA <130/80  Home BP at goal:  no  Office BP at goal:  no  24h ABPM:  not ordered to date   RDN candidate? YES  Contributing factors: tob, BMI   Plan:   - start/continue home BP monitoring, reviewed correct technique, provide BP log and instructions  - order 24h ABPM:  NO  - routine monitoring of lytes/gfr   Medications:  - continue doxazosin 2mg BID   - start telmisartan 20mg daily (PAD, hyperglycemia, BP)    LIPID MANAGEMENT  Qualifies for Statin Therapy per ACC/AHA Guidelines: yes, Secondary Prevention - Very high risk ASCVD - 2 major events or 1 event with other high-risk conditions  Major ASCVD events: Symptomatic PAD (hx of claudication with PRASANTH <0.85, previous revascularization/amputation) and Carotid plaque, >50% stenosis    High-risk conditions: >64yr old , Hypertension , and Current smoking   Lipoprotein(a): Ordered this visit  Currently on Statin: Yes  Tx goals: LDL-C <55   At goal? no  Plan:   - continue atorva 80mg   - add zetia as next agent, is [id number] strategy candidate as " "well   - update labs      GLYCEMIC MANAGEMENT Prediabetic  Lab Results   Component Value Date/Time    HBA1C 5.8 (H) 07/13/2023 06:39 AM    HBA1C 5.6 06/02/2022 06:29 AM       No results found for: \"MICROALBCALC\", \"MALBCRT\", \"MALBEXCR\", \"KVQXDO96\", \"MICROALBUR\", \"MICRALB\", \"UMICROALBUM\", \"MICROALBTIM\"    Plan:  - continue healthy diet, weight reduction, daily physical activity   - consider metformin initiation up to 850mg BID if A1c 6.2+ in future to reduce T2D progression  - monitor labs Q6-12mo     ANTITHROMBOTIC THERAPY: Yes , continue ASA 81mg daily   - will strongly consider DPI with ASA + vasc dosed xarelto in future     LIFESTYLE INTERVENTIONS  TOBACCO: Stages of Change: Action (active change in process 0-6mo) has reduced from 1.5 ppd to <0.5ppd currently (reports 7-8/day)     reports that he has been smoking cigarettes. He started smoking about 59 years ago. He has a 29.7 pack-year smoking history. He has never used smokeless tobacco.   - Provided strong recommendation for complete cessation and informed this is the primary contributor to the majority of all ASCVD and cancer-related conditions and can result in significant morbidity and early mortality.   - gave educ handouts, reviewed OTC NRT options   - reviewed resources for cessation including tobacco cessation clinic visit, pharmacotx meds, quit lines  - review at every visit   Provided 5 minutes of face-to-face counseling on above topics     PHYSICAL ACTIVITY: >150min/week of mod-intensity activity or as much as tolerated    NUTRITION: DASH , reduce Na to <1,500mg/day, and - handout provided     ETOH: to limit to occasional social use    WT MGMT: focus on 5-7% reduction as initial goal  (1kg loss reduces SBP by 1 mmHg)  - consider use of various wt reducing interventions and average wt loss potential:   Baseline diet/exercise (5-7+%)   And/or high-intensity meds (semaglutide, tirzepatide - 10-15+%)   And/org wt-reducing surgery (20-30+%)  - suggested " for ongoing f/u with PCP to review above     OTHER:      # seizure d/o - stable on meds, seeing neurology     # COPD - ongoing symptoms on inhalers, seeing PCP and pulm MD   - strong rec to stop smoking     STUDIES:   Now PRASANTH/art duplex     Pending vasc surg eval - carotid imaging   FOLLOW-UP: 2 months     Ezio Mcmillan M.D.   [facility]  [facility]  [phone]            [1]   Past Surgical History:  Procedure Laterality Date    CHOLECYSTECTOMY      4 years ago    OPEN REDUCTION      ribs     OTHER      gerd    OTHER      catarract bilat 6/2012   [2]   Current Outpatient Medications on File Prior to Visit   Medication Sig Dispense Refill    Levetiracetam 750 MG TABLET SR 24 HR Take 750 mg by mouth every day for 180 days. 90 Tablet 1    montelukast (SINGULAIR) 10 MG Tab Take 1 tablet by mouth once daily 90 Tablet 1    atorvastatin (LIPITOR) 80 MG tablet Take 1 Tablet by mouth every evening. 100 Tablet 3    celecoxib (CELEBREX) 200 MG Cap       aspirin (ASA) 81 MG Chew Tab chewable tablet Chew 1 Tablet every day. 100 Tablet 1    acetaminophen (TYLENOL) 325 MG Tab Take 325 mg by mouth 2 times a day.      fluticasone (FLONASE) 50 MCG/ACT nasal spray Administer 1 Spray into affected nostril(S) 2 times a day as needed.      famotidine (PEPCID) 20 MG Tab Take 20 mg by mouth 2 times a day.      Azelastine (ASTELIN) 137 MCG/SPRAY Solution Administer 1 Spray into affected nostril(S) every day. 30 mL 11    albuterol 108 (90 Base) MCG/ACT Aero Soln inhalation aerosol Inhale 2 Puffs every 6 hours as needed for Shortness of Breath. 18 g 11    fluticasone furoate-vilanterol (BREO ELLIPTA) 100-25 MCG/ACT AEROSOL POWDER, BREATH ACTIVATED Inhale 1 Puff every day. 180 Each 3    tiotropium (SPIRIVA HANDIHALER) 18 MCG Cap Place 1 Capsule into inhaler and inhale every day. 30 Capsule 3    doxazosin (CARDURA) 2 MG Tab Take 1 Tablet by mouth 2 times a day. 180 Tablet 3      No current facility-administered medications on file prior to visit.   [3]   Allergies  Allergen Reactions    Azithromycin      rash   [4]   Social History  Tobacco Use    Smoking status: Every Day     Current packs/day: 0.50     Average packs/day: 0.5 packs/day for 59.4 years (29.7 ttl pk-yrs)     Types: Cigarettes     Start date: 1966    Smokeless tobacco: Never   Vaping Use    Vaping status: Never Used   Substance Use Topics    Alcohol use: No     Comment: drinks ETOH occasionally    Drug use: No

## 2025-05-21 ENCOUNTER — TELEPHONE (OUTPATIENT)
Dept: VASCULAR LAB | Facility: MEDICAL CENTER | Age: 80
End: 2025-05-21
Payer: MEDICARE

## 2025-05-21 NOTE — TELEPHONE ENCOUNTER
Spoke with patients wife to remind them pt is due for imaging. Patients wife states understanding and is in agreement with scheduling. Imaging scheduling phone number provided.       Mariella Jordan R.N.      5/21/25  3:21 PM  Note      Pt due for surveillance imaging, not yet scheduled. Mychart unavailable. Due for US ble art duplex per JUDITH in May.      Request for MA to call patient and remind them to schedule their surveillance vascular imaging.                    Yuliet Polo, Medical Assistant   [facility]   Ph: [phone]  Fx: [phone]

## 2025-05-21 NOTE — TELEPHONE ENCOUNTER
Pt due for surveillance imaging, not yet scheduled. Mychart unavailable. Due for US ble art duplex per JUDITH in May.     Request for MA to call patient and remind them to schedule their surveillance vascular imaging.

## 2025-05-28 ENCOUNTER — HOSPITAL ENCOUNTER (OUTPATIENT)
Dept: LAB | Facility: MEDICAL CENTER | Age: 80
End: 2025-05-28
Attending: FAMILY MEDICINE
Payer: MEDICARE

## 2025-05-28 DIAGNOSIS — I10 HYPERTENSION, UNSPECIFIED TYPE: ICD-10-CM

## 2025-05-28 DIAGNOSIS — I73.9 PAD (PERIPHERAL ARTERY DISEASE) (HCC): ICD-10-CM

## 2025-05-28 DIAGNOSIS — E78.5 DYSLIPIDEMIA: ICD-10-CM

## 2025-05-28 LAB
CHOLEST SERPL-MCNC: 115 MG/DL (ref 100–199)
CREAT UR-MCNC: 207 MG/DL
CRP SERPL HS-MCNC: 1.9 MG/L (ref 0–3)
ERYTHROCYTE [DISTWIDTH] IN BLOOD BY AUTOMATED COUNT: 48.7 FL (ref 35.9–50)
FASTING STATUS PATIENT QL REPORTED: NORMAL
HCT VFR BLD AUTO: 49.2 % (ref 42–52)
HDLC SERPL-MCNC: 37 MG/DL
HGB BLD-MCNC: 16.1 G/DL (ref 14–18)
LDLC SERPL CALC-MCNC: 56 MG/DL
MCH RBC QN AUTO: 32.2 PG (ref 27–33)
MCHC RBC AUTO-ENTMCNC: 32.7 G/DL (ref 32.3–36.5)
MCV RBC AUTO: 98.4 FL (ref 81.4–97.8)
MICROALBUMIN UR-MCNC: 3.6 MG/DL
MICROALBUMIN/CREAT UR: 17 MG/G (ref 0–30)
PLATELET # BLD AUTO: 194 K/UL (ref 164–446)
PMV BLD AUTO: 10.7 FL (ref 9–12.9)
RBC # BLD AUTO: 5 M/UL (ref 4.7–6.1)
TRIGL SERPL-MCNC: 108 MG/DL (ref 0–149)
WBC # BLD AUTO: 7.6 K/UL (ref 4.8–10.8)

## 2025-05-28 PROCEDURE — 82043 UR ALBUMIN QUANTITATIVE: CPT

## 2025-05-28 PROCEDURE — 86141 C-REACTIVE PROTEIN HS: CPT

## 2025-05-28 PROCEDURE — 80061 LIPID PANEL: CPT

## 2025-05-28 PROCEDURE — 82172 ASSAY OF APOLIPOPROTEIN: CPT

## 2025-05-28 PROCEDURE — 85027 COMPLETE CBC AUTOMATED: CPT

## 2025-05-28 PROCEDURE — 83695 ASSAY OF LIPOPROTEIN(A): CPT

## 2025-05-28 PROCEDURE — 82570 ASSAY OF URINE CREATININE: CPT

## 2025-05-28 PROCEDURE — 36415 COLL VENOUS BLD VENIPUNCTURE: CPT

## 2025-05-30 LAB
APO B100 SERPL-MCNC: 57 MG/DL (ref 66–133)
LPA SERPL-MCNC: <6 MG/DL

## 2025-06-05 ENCOUNTER — OFFICE VISIT (OUTPATIENT)
Facility: MEDICAL CENTER | Age: 80
End: 2025-06-05
Payer: MEDICARE

## 2025-06-05 VITALS
WEIGHT: 202.82 LBS | DIASTOLIC BLOOD PRESSURE: 50 MMHG | BODY MASS INDEX: 30.04 KG/M2 | HEIGHT: 69 IN | HEART RATE: 101 BPM | OXYGEN SATURATION: 91 % | SYSTOLIC BLOOD PRESSURE: 110 MMHG

## 2025-06-05 DIAGNOSIS — Z72.0 TOBACCO USE: ICD-10-CM

## 2025-06-05 DIAGNOSIS — J44.9 CHRONIC OBSTRUCTIVE PULMONARY DISEASE, UNSPECIFIED COPD TYPE (HCC): ICD-10-CM

## 2025-06-05 DIAGNOSIS — I65.21 STENOSIS OF RIGHT CAROTID ARTERY: Primary | ICD-10-CM

## 2025-06-05 DIAGNOSIS — E78.5 DYSLIPIDEMIA: ICD-10-CM

## 2025-06-05 DIAGNOSIS — Z87.898 HISTORY OF SEIZURE: ICD-10-CM

## 2025-06-05 PROCEDURE — 3074F SYST BP LT 130 MM HG: CPT | Performed by: SURGERY

## 2025-06-05 PROCEDURE — 3078F DIAST BP <80 MM HG: CPT | Performed by: SURGERY

## 2025-06-05 PROCEDURE — 99204 OFFICE O/P NEW MOD 45 MIN: CPT | Performed by: SURGERY

## 2025-06-05 ASSESSMENT — FIBROSIS 4 INDEX: FIB4 SCORE: 2.5

## 2025-06-05 NOTE — PROGRESS NOTES
"  VASCULAR SURGERY SERVICE  CONSULT NOTE      Date: 6/5/2025    Referring Provider: Ezio Mcmillan MD    Consulting Physician: Evelina Latham MD - [facility]     -------------------------------------------------------------------------------------------------    Reason for consultation:  Severe right carotid artery stenosis.    HPI:  This is a 79 y.o. right-handed male with multiple medical problems, history of seizure, who was found to have 80% right internal carotid artery stenosis on CTA performed recently.  Patient denies history of stroke or any neurological symptom except for history of seizure with the last episode back in March of this year.  Patient is a smoker, now down to half a pack a day.  He is in the process of trying to stop smoking.  Patient was also diagnosed with peripheral arterial disease but tells me that he has essentially no problem with ambulation.  He would go \"rounds in [facility]\" with no issue.    Patient is on aspirin and statin.    Past Medical History[1]    Past Surgical History[2]    Current Medications[3]    Social History     Socioeconomic History    Marital status: Single     Spouse name: Not on file    Number of children: Not on file    Years of education: Not on file    Highest education level: Not on file   Occupational History    Not on file   Tobacco Use    Smoking status: Every Day     Current packs/day: 0.50     Average packs/day: 0.5 packs/day for 59.4 years (29.7 ttl pk-yrs)     Types: Cigarettes     Start date: 1966    Smokeless tobacco: Never   Vaping Use    Vaping status: Never Used   Substance and Sexual Activity    Alcohol use: No     Comment: drinks ETOH occasionally    Drug use: No    Sexual activity: Not on file   Other Topics Concern    Not on file   Social History Narrative    ** Merged History Encounter **          Social Drivers of Health     Financial Resource Strain: Low Risk  (11/6/2024)    Overall Financial Resource Strain (CARDIA)     Difficulty of " Paying Living Expenses: Not very hard   Food Insecurity: No Food Insecurity (11/6/2024)    Hunger Vital Sign     Worried About Running Out of Food in the Last Year: Never true     Ran Out of Food in the Last Year: Never true   Transportation Needs: No Transportation Needs (11/6/2024)    PRAPARE - Transportation     Lack of Transportation (Medical): No     Lack of Transportation (Non-Medical): No   Physical Activity: Insufficiently Active (11/6/2024)    Exercise Vital Sign     Days of Exercise per Week: 7 days     Minutes of Exercise per Session: 20 min   Stress: No Stress Concern Present (11/6/2024)    [employer] [place] [employer] - Occupational Stress Questionnaire     Feeling of Stress : Not at all   Social Connections: Moderately Isolated (11/6/2024)    Social Connection and Isolation Panel [NHANES]     Frequency of Communication with Friends and Family: Twice a week     Frequency of Social Gatherings with Friends and Family: Three times a week     Attends Sikh Services: Never     Active Member of Clubs or Organizations: No     Attends Club or Organization Meetings: Never     Marital Status: Living with partner   Intimate Partner Violence: Not At Risk (11/6/2024)    Humiliation, Afraid, Rape, and Kick questionnaire     Fear of Current or Ex-Partner: No     Emotionally Abused: No     Physically Abused: No     Sexually Abused: No   Housing Stability: Low Risk  (11/6/2024)    Housing Stability Vital Sign     Unable to Pay for Housing in the Last Year: No     Number of Times Moved in the Last Year: 0     Homeless in the Last Year: No       Family History   Problem Relation Age of Onset    Diabetes Mother     Diabetes Father     Cancer Neg Hx        Allergies:  Azithromycin    Review of Systems:    Constitutional: Negative for fever, chills, weight loss,   HENT:    Negative for hearing loss or tinnitus    Eyes:    Negative for blurred vision, double vision, or loss of vision  Respiratory:  Negative  "for cough, hemoptysis, or wheezing    Cardiac:  Negative for chest pain or palpitations or orthopnea  Vascular:  Negative for claudication or rest pain   Gastrointestinal: Negative for nausea, vomiting, or abdominal pain     Negative for hematochezia or melena   Genitourinary: Negative for dysuria, frequency, or hematuria   Musculoskeletal: Chronic right hip pain   Skin:   Negative for itching or rash  Neurological:  Negative for dizziness, headaches, or tremors     Negative for speech disturbance     Negative for extremity weakness or paresthesias  Endo/Heme:  Negative for easy bruising or bleeding  Psychiatric:  Negative for depression, suicidal ideas, or hallucinations    Physical Exam:  /50 (BP Location: Left arm, Patient Position: Sitting, BP Cuff Size: Adult)   Pulse (!) 101   Ht 1.753 m (5' 9\")   Wt 92 kg (202 lb 13.2 oz)   SpO2 91%     Constitutional: Alert, oriented, no acute distress  HEENT:  Normocephalic and atraumatic, EOMI  Neck:   Supple, no JVD, no bruits.  Cardiovascular: Regular rate and rhythm  Pulmonary:  Good air entry bilaterally  Abdominal:  Soft, non-tender, non-distended     Aortic impulse not widened  Musculoskeletal: No edema, no tenderness  Neurological:  CN II-XII grossly intact, no focal deficits  Skin:   Skin is warm and dry. No rash noted.  Psychiatric:  Normal mood and affect.  Upper extremities: Palpable bilateral radial pulses with multiphasic flow.  Lower extremities: Feet are warm, well-perfused, no ulceration.    Labs:  Reviewed.    Radiology:  Reviewed.    Assessment:  - Severe right internal carotid artery stenosis, asymptomatic.  - History of seizure.  - Tobacco use.  - COPD.  - Dyslipidemia.    Plan:  I had a long discussion with patient.  Study results were reviewed with him.  Since there is degree of stenosis is more than 70%, invasive intervention for the right carotid artery stenosis is indicated.  I discussed with patient the options of: Right carotid surgery, " right carotid stenting, and no invasive intervention along with the pros and cons of all approaches.  After a long discussion, patient would like to proceed with right carotid surgery, fully understanding all risks which include, but not limited to, bleeding, infection, hyperperfusion syndrome, stroke, heart attack, and perioperative anesthetic complications.  I also told patient that following the surgery, he will have numbness around the incision and his right earlobe and the numbness may take up to 6 months to go away.  I advised patient to shave using electrical shaver instead of razor to avoid accidentally cutting himself due to the numbness.  Patient indicated understanding of the conversation.  At his request, the procedure will be performed on June 25, 2025, pending operating room availability.  I asked patient to call 911 and be taken to the emergency room if he develop any neurological symptom at any time.  He was instructed on the signs and symptoms to look out for.    I counseled patient to stop smoking.      Evelina Latham MD  [facility]   [place] preferred or call my office [phone]  __________________________________________________________________  Patient:Josh Pang   MRN:[id number]   CSN:[id number]           [1]   Past Medical History:  Diagnosis Date    CATARACT     right     GERD (gastroesophageal reflux disease)     Heart murmur     Hepatitis C     IVDU (intravenous drug user) 8/18/2009    Migraine     Psychiatric disorder     depression    Ulcer     1970's    [2]   Past Surgical History:  Procedure Laterality Date    CHOLECYSTECTOMY      4 years ago    OPEN REDUCTION      ribs     OTHER      gerd    OTHER      catarract bilat 6/2012   [3]   Current Outpatient Medications   Medication Sig Dispense Refill    telmisartan (MICARDIS) 20 MG tablet Take 1 Tablet by mouth every day. to lower blood pressure 100 Tablet 3    Levetiracetam 750 MG TABLET SR 24 HR Take 750 mg by mouth every  day for 180 days. 90 Tablet 1    montelukast (SINGULAIR) 10 MG Tab Take 1 tablet by mouth once daily 90 Tablet 1    atorvastatin (LIPITOR) 80 MG tablet Take 1 Tablet by mouth every evening. 100 Tablet 3    celecoxib (CELEBREX) 200 MG Cap       aspirin (ASA) 81 MG Chew Tab chewable tablet Chew 1 Tablet every day. 100 Tablet 1    acetaminophen (TYLENOL) 325 MG Tab Take 325 mg by mouth 2 times a day.      fluticasone (FLONASE) 50 MCG/ACT nasal spray Administer 1 Spray into affected nostril(S) 2 times a day as needed.      famotidine (PEPCID) 20 MG Tab Take 20 mg by mouth 2 times a day.      Azelastine (ASTELIN) 137 MCG/SPRAY Solution Administer 1 Spray into affected nostril(S) every day. 30 mL 11    albuterol 108 (90 Base) MCG/ACT Aero Soln inhalation aerosol Inhale 2 Puffs every 6 hours as needed for Shortness of Breath. 18 g 11    fluticasone furoate-vilanterol (BREO ELLIPTA) 100-25 MCG/ACT AEROSOL POWDER, BREATH ACTIVATED Inhale 1 Puff every day. 180 Each 3    tiotropium (SPIRIVA HANDIHALER) 18 MCG Cap Place 1 Capsule into inhaler and inhale every day. 30 Capsule 3    doxazosin (CARDURA) 2 MG Tab Take 1 Tablet by mouth 2 times a day. 180 Tablet 3     No current facility-administered medications for this visit.

## 2025-06-09 DIAGNOSIS — N40.0 BPH WITHOUT URINARY OBSTRUCTION: ICD-10-CM

## 2025-06-10 ENCOUNTER — OFFICE VISIT (OUTPATIENT)
Dept: NEUROLOGY | Facility: MEDICAL CENTER | Age: 80
End: 2025-06-10
Attending: STUDENT IN AN ORGANIZED HEALTH CARE EDUCATION/TRAINING PROGRAM
Payer: MEDICARE

## 2025-06-10 VITALS
HEIGHT: 70 IN | DIASTOLIC BLOOD PRESSURE: 60 MMHG | SYSTOLIC BLOOD PRESSURE: 122 MMHG | BODY MASS INDEX: 29.57 KG/M2 | WEIGHT: 206.57 LBS | HEART RATE: 78 BPM | OXYGEN SATURATION: 93 % | RESPIRATION RATE: 15 BRPM

## 2025-06-10 DIAGNOSIS — Z71.89 GOALS OF CARE, COUNSELING/DISCUSSION: ICD-10-CM

## 2025-06-10 DIAGNOSIS — I65.21 INTERNAL CAROTID ARTERY STENOSIS, RIGHT: ICD-10-CM

## 2025-06-10 DIAGNOSIS — R56.9 SEIZURE (HCC): Primary | ICD-10-CM

## 2025-06-10 DIAGNOSIS — H33.22 LEFT RETINAL DETACHMENT: ICD-10-CM

## 2025-06-10 DIAGNOSIS — Z91.89 AT RISK FOR OBSTRUCTIVE SLEEP APNEA: ICD-10-CM

## 2025-06-10 DIAGNOSIS — Z72.0 TOBACCO USE: ICD-10-CM

## 2025-06-10 PROCEDURE — 3078F DIAST BP <80 MM HG: CPT | Performed by: STUDENT IN AN ORGANIZED HEALTH CARE EDUCATION/TRAINING PROGRAM

## 2025-06-10 PROCEDURE — 99214 OFFICE O/P EST MOD 30 MIN: CPT | Performed by: STUDENT IN AN ORGANIZED HEALTH CARE EDUCATION/TRAINING PROGRAM

## 2025-06-10 PROCEDURE — 3074F SYST BP LT 130 MM HG: CPT | Performed by: STUDENT IN AN ORGANIZED HEALTH CARE EDUCATION/TRAINING PROGRAM

## 2025-06-10 RX ORDER — DOXAZOSIN 2 MG/1
2 TABLET ORAL 2 TIMES DAILY
Qty: 200 TABLET | Refills: 1 | Status: SHIPPED | OUTPATIENT
Start: 2025-06-10

## 2025-06-10 ASSESSMENT — PATIENT HEALTH QUESTIONNAIRE - PHQ9
SUM OF ALL RESPONSES TO PHQ QUESTIONS 1-9: 3
CLINICAL INTERPRETATION OF PHQ2 SCORE: 1
5. POOR APPETITE OR OVEREATING: 0 - NOT AT ALL

## 2025-06-10 ASSESSMENT — FIBROSIS 4 INDEX: FIB4 SCORE: 2.5

## 2025-06-10 NOTE — TELEPHONE ENCOUNTER
Received request via: Pharmacy    Was the patient seen in the last year in this department? Yes    Does the patient have an active prescription (recently filled or refills available) for medication(s) requested? No    Pharmacy Name:   [id number] - [place], [place] - [address]              Does the patient have care home Plus and need 100-day supply? (This applies to ALL medications) Yes, quantity updated to 100 days   sudden onset

## 2025-06-10 NOTE — PROGRESS NOTES
[facility]  Follow up visit    Patient name: Josh Pang  YOB: 1945  MRN: [id number]  Date of visit: 4/7/2025     Background:    Josh Pang is a 79 y.o. man being seen in follow up for seizures. Last/initial visit 1/10/2025.     Details of history:    Patient was in his usual state of health the day prior to first lifetime seizure on November 3, 2024.      The patient and his wife were sleeping. His wife noted that he had some milder body twitching, then it became more pronounced. He had generalized convulsions. She called 911. Convulsions lasted approximately 1 minute. He had post-ictal confusion, remembers brief periods of transport to the hospital, then recalls being in the hospital being examined by a doctor.  No urinary/bowel incontinence or tongue biting.     No clear provoking factors, however he did take a small amount of his wife's methadone.    Patient had a second lifetime seizure on 3/2/2025. Onset was unwitnessed. He was in bed that morning and his wife heard loud snoring in the bedroom, went to check on him and noted that he was not able to speak. He was taken to the ED.  At that time he had completed routine EEG which was normal and was scheduled for a 24h ambulatory EEG later that week.     He was started on Keppra 750 mg BID at that time. He followed up with his PCP for excessive fatigue at that dose, decreased to 500 mg BID which is still causing fatigue.     Prior 24h EEG was notable for occasional runs of R frontal delta slowing.      Wife takes 59 mg of methadone daily. He prepares the bottles weekly. He rinsed out one of her bottles of methadone a few days prior to having the first seizure.  He denies any additional use in the days leading up to the seizure aside from that 1 time.     No hx of episodic or transient neurologic symptoms.  No episodic staring spells, confusion, extremity jerking or shaking, unexplained numbness, or other symptoms concerning for  aura.     He was found to have 80% carotid stenosis on the right. He takes aspirin 81 mg daily. He also takes Atorvastatin which was increased to 80 mg daily.  He denies a history of episodes of left hemibody weakness or numbness, or other symptoms concerning for previous TIA.     He tried Chantix for smoking cessation about 10 years ago. This did not work and gave him overly vivid dreams.      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            Driving: he does not drive     Risk factors for epileptic seizures:  Normal birth history, no complications, born at term. Patient had a twin sister that  shortly after birth.  No history of significant head trauma.   No history of stroke or meningitis.  No family history of epilepsy. Brother had post-traumatic epilepsy related to falling off of a milk truck.  No febrile seizures.      Social history: lives with his wife. He is a daily tobacco smoker. He has decreased intake to 1/2 PPD since incident. He has lived in [place] for 30 years. He is retired. 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 No recreational drug use. Remote history of IVDU.      Tobacco abuse: Trying to quit smoking. Decreased to 1/2 ppd.        Sleep: longstanding history of poor sleep at night. He naps a couple of times a day then can't sleep well at night. He has previously been prescribed Ambien for sleep and did not like how it made him feel. Later tried Temazepam and several others including melatonin. Each was ineffective. STOP-BANG score 4 (high risk for HUDSON)      Interval history:  He is doing well and seizure-free.     Discussed goals of care, he would not want to pursue aggressive treatment if he were to have a disabling deficit in the future. He is concerned about how his wife would fare when he eventually passes because she is 12 years younger than him. He does not have a close relationship with his other family members, citing his  prior drug use which he regrets in general.                Mood:      6/10/2025     8:20 AM 1/10/2025     8:20 AM 10/23/2024    12:58 PM 6/10/2024     9:20 AM 1/15/2024     2:00 PM   PHQ-9 Screening   Little interest or pleasure in doing things 1 - several days 1 - several days 0 - not at all 0 - not at all 0 - not at all   Feeling down, depressed, or hopeless 0 - not at all 0 - not at all 0 - not at all 0 - not at all 0 - not at all   Trouble falling or staying asleep, or sleeping too much 1 - several days 1 - several days      Feeling tired or having little energy 1 - several days 1 - several days      Poor appetite or overeating 0 - not at all 0 - not at all      Feeling bad about yourself - or that you are a failure or have let yourself or your family down 0 - not at all 0 - not at all      Trouble concentrating on things, such as reading the newspaper or watching television 0 - not at all 0 - not at all      Moving or speaking so slowly that other people could have noticed. Or the opposite - being so fidgety or restless that you have been moving around a lot more than usual 0 - not at all 0 - not at all      Thoughts that you would be better off dead, or of hurting yourself in some way 0 - not at all 0 - not at all      PHQ-2 Total Score 1 1 0 0 0   PHQ-9 Total Score 3 3                    Current Medications:   Current Outpatient Medications:     telmisartan (MICARDIS) 20 MG tablet, Take 1 Tablet by mouth every day. to lower blood pressure, Disp: 100 Tablet, Rfl: 3    Levetiracetam 750 MG TABLET SR 24 HR, Take 750 mg by mouth every day for 180 days., Disp: 90 Tablet, Rfl: 1    montelukast (SINGULAIR) 10 MG Tab, Take 1 tablet by mouth once daily, Disp: 90 Tablet, Rfl: 1    atorvastatin (LIPITOR) 80 MG tablet, Take 1 Tablet by mouth every evening., Disp: 100 Tablet, Rfl: 3    celecoxib (CELEBREX) 200 MG Cap, , Disp: , Rfl:     aspirin (ASA) 81 MG Chew Tab chewable tablet, Chew 1 Tablet every day., Disp: 100  Tablet, Rfl: 1    acetaminophen (TYLENOL) 325 MG Tab, Take 325 mg by mouth 2 times a day., Disp: , Rfl:     fluticasone (FLONASE) 50 MCG/ACT nasal spray, Administer 1 Spray into affected nostril(S) 2 times a day as needed., Disp: , Rfl:     famotidine (PEPCID) 20 MG Tab, Take 20 mg by mouth 2 times a day., Disp: , Rfl:     Azelastine (ASTELIN) 137 MCG/SPRAY Solution, Administer 1 Spray into affected nostril(S) every day., Disp: 30 mL, Rfl: 11    albuterol 108 (90 Base) MCG/ACT Aero Soln inhalation aerosol, Inhale 2 Puffs every 6 hours as needed for Shortness of Breath., Disp: 18 g, Rfl: 11    fluticasone furoate-vilanterol (BREO ELLIPTA) 100-25 MCG/ACT AEROSOL POWDER, BREATH ACTIVATED, Inhale 1 Puff every day., Disp: 180 Each, Rfl: 3    tiotropium (SPIRIVA HANDIHALER) 18 MCG Cap, Place 1 Capsule into inhaler and inhale every day., Disp: 30 Capsule, Rfl: 3    doxazosin (CARDURA) 2 MG Tab, Take 1 Tablet by mouth 2 times a day., Disp: 180 Tablet, Rfl: 3    Allergies:   Allergies   Allergen Reactions    Azithromycin      rash         Physical Exam:   Ambulatory Vitals  Vitals:    06/10/25 0809   BP: 122/60   Pulse: 78   Resp: 15   SpO2: 93%       Constitutional: Well-developed, well-nourished, good hygiene. Appears stated age.  Respiratory: normal respiratory effort  Skin: Warm, dry, intact. No rashes observed.  Neurologic:   Mental Status: Awake, alert, oriented x 4.   Speech: Fluent with normal prosody.   Memory: Able to recall recent and remote events accurately.    Concentration: Attentive. Able to focus on history.   Fund of Knowledge: Appropriate.   Cranial Nerves:    CN II: L eye blindness 2/2 retinal detachment    CN III, IV, VI: EOMI with L eye mild exotropia and impaired convergence     CN VII: No facial asymmetry    CN VIII: Hearing intact to voice   Motor: moving all extremities fully and equally    Gait: ambulates steadily without assistive device   Movements: No resting tremors or abnormal movements  observed.       Studies:      Labs reviewed      Imaging:     MRI Brain 11/3/2024:  IMPRESSION:  1. Age-related cerebral atrophy.     2. Mild periventricular and juxtacortical white matter changes consistent with chronic microvascular ischemic gliosis.     3. No evidence of mesial temporal sclerosis, cortical dysplasia, or heterotopic gray matter.    EEG Results:   Ambulatory EEG 3/6/2025:  INTERPRETATION:  Abnormal ambulatory EEG recording in the awake and drowsy/sleep state(s):  -Occasional runs of right frontal delta slowing, a finding which may be seen in the setting of focal cerebral dysfunction in this region, postictally, or other clinical context. Clinical and radiologic correlation recommended.   -No epileptiform discharges were seen.  -No seizures.   -Clinical Events: none     Note: this EEG does not exclude the possibility of epilepsy, which is a clinical diagnosis. Clinical correlation is recommended.           Assessment/Plan:   Josh Pang is a 79 y.o. man with a history of epilepsy being seen in follow up. He has had two lifetime seizures, the first on 11/3/2024 and the second on 3/2/2025. He was started on Keppra 750 mg BID after the second seizure and had an ambulatory EEG later that week which did not show epileptiform discharges.     He poorly tolerated the 750 mg BID dose and PCP decreased dose to 500 mg BID which is still causing fatigue. He is doing well taking Keppra  mg nightly.     Patient has not been driving for several years.     Counseled patient to take vitamin D.     Counseled previously regarding SUDEP, low risk since he has started anti-seizure medication.     Counseled regarding medication side effects, factors that lower seizure threshold, seizure safety, and other pertinent topics for a new diagnosis of epilepsy.       1. Seizure (HCC) (Primary)  - Continue Keppra  mg nightly  - KEPPRA; Future    2. At risk for obstructive sleep apnea  He has sleep disturbance which  is chronic and is at high risk for HUDSON based on STOP-BANG score. Referral to pulmonology/sleep medicine placed previously, visit scheduled 9/2025.     3. Tobacco use  - Patient is attempting to quit, using nicotine patch and decreased cigarette use to 1/2 pack per day    4. Internal carotid artery stenosis, right  - Surgery scheduled in July     5. Goals of care, counseling/discussion  - Patient advised to fill out advance directive. He stated that he would prefer not to pursue aggressive treatment if he were to have a disabling deficit, for example from a stroke.     6. Left retinal detachment  - Resultant left eye blindness, baseline       Follow up in approximately 6 months.     Samina Acosta M.D.   Diplomate, Neurology with Special Qualification in Epilepsy, American Board of Psychiatry and Neurology   of Clinical Neurology, [employer]      During today's encounter we discussed available treatment options and their individual side effect profiles. Total encounter time caring for patient today 32 minutes.

## 2025-06-18 ENCOUNTER — OFFICE VISIT (OUTPATIENT)
Dept: MEDICAL GROUP | Facility: MEDICAL CENTER | Age: 80
End: 2025-06-18
Payer: MEDICARE

## 2025-06-18 ENCOUNTER — HOSPITAL ENCOUNTER (OUTPATIENT)
Dept: LAB | Facility: MEDICAL CENTER | Age: 80
End: 2025-06-18
Attending: STUDENT IN AN ORGANIZED HEALTH CARE EDUCATION/TRAINING PROGRAM
Payer: MEDICARE

## 2025-06-18 VITALS
RESPIRATION RATE: 18 BRPM | HEIGHT: 70 IN | HEART RATE: 61 BPM | SYSTOLIC BLOOD PRESSURE: 128 MMHG | TEMPERATURE: 98 F | OXYGEN SATURATION: 97 % | BODY MASS INDEX: 29.18 KG/M2 | WEIGHT: 203.8 LBS | DIASTOLIC BLOOD PRESSURE: 58 MMHG

## 2025-06-18 DIAGNOSIS — I73.9 PAD (PERIPHERAL ARTERY DISEASE) (HCC): ICD-10-CM

## 2025-06-18 DIAGNOSIS — Z72.0 TOBACCO USE: Primary | ICD-10-CM

## 2025-06-18 DIAGNOSIS — R56.9 SEIZURE (HCC): ICD-10-CM

## 2025-06-18 DIAGNOSIS — R56.9 SEIZURE-LIKE ACTIVITY (HCC): ICD-10-CM

## 2025-06-18 DIAGNOSIS — I65.21 CAROTID STENOSIS, RIGHT: ICD-10-CM

## 2025-06-18 PROCEDURE — 3074F SYST BP LT 130 MM HG: CPT | Performed by: STUDENT IN AN ORGANIZED HEALTH CARE EDUCATION/TRAINING PROGRAM

## 2025-06-18 PROCEDURE — 36415 COLL VENOUS BLD VENIPUNCTURE: CPT

## 2025-06-18 PROCEDURE — 99214 OFFICE O/P EST MOD 30 MIN: CPT | Performed by: STUDENT IN AN ORGANIZED HEALTH CARE EDUCATION/TRAINING PROGRAM

## 2025-06-18 PROCEDURE — 80177 DRUG SCRN QUAN LEVETIRACETAM: CPT

## 2025-06-18 PROCEDURE — 3078F DIAST BP <80 MM HG: CPT | Performed by: STUDENT IN AN ORGANIZED HEALTH CARE EDUCATION/TRAINING PROGRAM

## 2025-06-18 ASSESSMENT — ENCOUNTER SYMPTOMS
WHEEZING: 0
FEVER: 0
CHILLS: 0
WEIGHT LOSS: 0
HEADACHES: 0
DIZZINESS: 0
NAUSEA: 0
VOMITING: 0
SHORTNESS OF BREATH: 0
PALPITATIONS: 0

## 2025-06-18 ASSESSMENT — FIBROSIS 4 INDEX: FIB4 SCORE: 2.5

## 2025-06-18 NOTE — PROGRESS NOTES
Subjective:     CC: quest regarding upcoming surgery       HPI:   Josh presents today with    PMH HLD, IFG, ?COPD ( last pft 2018 read normal), tobacco use, GERD, hep C report s/p treatment ( MR abdomen done in 2007 wo cirrhosis), seizure like episode  Specialist  Neurology   Vascular surgery   Vascular medicine    Last seen for post hospital follow up. Present for follow up.  Keppra was increased to 750mg bid with neurology     Planned right carotid enterectomy   CTA 11/2024 showed 80% proximal right ICA stenosis. Otherwise, negative.   We discussed healing time, smoking cessation, medications. MET 6.61. We discussed: [email or web address]    Verbal consent was acquired by the patient to use [employer] ambient listening note generation during this visit Yes   History of Present Illness  The patient presents for preoperative clearance.    He is scheduled for a right carotid endarterectomy on 07/16/2025 and has expressed concerns regarding dietary restrictions, the necessity of an electric brace, and the expected recovery period. He has been informed that he may require another blood draw if the previous sample was insufficient. He has experienced weight loss recently. He maintains a moderate level of physical activity, including walking 1 to 2 blocks on level ground, climbing stairs, and performing light household chores. He resides in a small apartment without a yard and has not engaged in sexual activity for some time. He does not participate in sports such as golf, bowling, dancing, tennis, football, basketball, or skiing, but previously enjoyed swimming. He has expressed interest in completing advance directives prior to his surgery.    He has a history of smoking less than half a pack per day but does not consume alcohol. He is currently on aspirin therapy and has been prescribed Keppra 500 mg by his neurologist, which he reports as causing mild  "diarrhea. He also takes celecoxib for pain management.    SOCIAL HISTORY  He does not drink alcohol. He still smokes less than half a pack a day.      Health Maintenance:   ROS:  Review of Systems   Constitutional:  Negative for chills, fever and weight loss.   HENT:  Negative for hearing loss.    Respiratory:  Negative for shortness of breath and wheezing.    Cardiovascular:  Negative for chest pain and palpitations.   Gastrointestinal:  Negative for nausea and vomiting.   Genitourinary:  Negative for frequency and urgency.   Skin:  Negative for rash.   Neurological:  Negative for dizziness and headaches.       Objective:     Exam:  /58   Pulse 61   Temp 36.7 °C (98 °F) (Temporal)   Resp 18   Ht 1.765 m (5' 9.5\")   Wt 92.4 kg (203 lb 12.8 oz)   SpO2 97%   BMI 29.66 kg/m²  Body mass index is 29.66 kg/m².    Physical Exam  Constitutional:       Appearance: Normal appearance.   Cardiovascular:      Rate and Rhythm: Normal rate and regular rhythm.   Pulmonary:      Effort: Pulmonary effort is normal.      Breath sounds: Normal breath sounds.   Musculoskeletal:      Cervical back: Normal range of motion and neck supple.   Neurological:      Mental Status: He is alert.       MET 6.61    Labs:     Assessment & Plan:     79 y.o. male with the following -     1. Seizure-like activity (HCC)  He report he feel keppra causes loose stool   Reviewed neurology note, recommend patient continue keppra 750mg BID as prescribe and discuss this with neurology    2. Carotid stenosis, right  3. PAD  4. Tobacco use (Primary)  Continue asa, atorvastatin 80mg daily, discussed need for smoking cessation.   Question relating to surgery were answered  Assessment & Plan  1. Carotid stenosis, PAD, question regarding surgery.  - Advised to abstain from smoking for a minimum of 8 weeks prior to surgery to facilitate optimal healing.  - Surgeon will provide further instructions regarding necessary medication adjustments.  - Potential " risks associated with surgery, including bleeding, infection, blood clots, nerve damage, seizure, brain damage, stroke, and heart attack, have been thoroughly discussed.  - Encouraged to maintain a regular diet ; anticipated to continue all medications prior to surgery. MET 6.61 increase in cardiac risk.   Patient request for advance directive, Paperwork for advance directives was be provided.        Return in about 3 months (around 9/18/2025) for Lab review, Med check.    Please note that this dictation was created using voice recognition software. I have made every reasonable attempt to correct obvious errors, but I expect that there are errors of grammar and possibly content that I did not discover before finalizing the note.

## 2025-06-19 LAB — LEVETIRACETAM SERPL-MCNC: 15 UG/ML (ref 10–40)

## 2025-06-25 ENCOUNTER — APPOINTMENT (OUTPATIENT)
Dept: ADMISSIONS | Facility: MEDICAL CENTER | Age: 80
DRG: 039 | End: 2025-06-25
Attending: SURGERY
Payer: MEDICARE

## 2025-06-26 ENCOUNTER — DOCUMENTATION (OUTPATIENT)
Facility: MEDICAL CENTER | Age: 80
End: 2025-06-26
Payer: MEDICARE

## 2025-06-26 ENCOUNTER — HOSPITAL ENCOUNTER (OUTPATIENT)
Dept: RADIOLOGY | Facility: MEDICAL CENTER | Age: 80
End: 2025-06-26
Attending: FAMILY MEDICINE
Payer: MEDICARE

## 2025-06-26 DIAGNOSIS — I73.9 PAD (PERIPHERAL ARTERY DISEASE) (HCC): ICD-10-CM

## 2025-06-26 DIAGNOSIS — I70.203 BILATERAL FEMORAL ARTERY STENOSIS (HCC): ICD-10-CM

## 2025-06-26 PROCEDURE — 93925 LOWER EXTREMITY STUDY: CPT

## 2025-06-26 PROCEDURE — 93922 UPR/L XTREMITY ART 2 LEVELS: CPT

## 2025-06-27 ENCOUNTER — RESULTS FOLLOW-UP (OUTPATIENT)
Dept: NEUROLOGY | Facility: MEDICAL CENTER | Age: 80
End: 2025-06-27

## 2025-06-27 NOTE — PROGRESS NOTES
Lower extremity arterial studies consistent with persistent bilateral SFA occlusions.  PRASANTH perhaps a bit lower than previous.    Will discuss with patient at follow-up visit  Assuming patient remains asymptomatic we will continue with medical management and lifestyle modification and repeat bilateral lower extremity arterial in 2 years-July 2027    Michael J. Bloch, MD  Vascular Care

## 2025-07-01 ENCOUNTER — PRE-ADMISSION TESTING (OUTPATIENT)
Dept: ADMISSIONS | Facility: MEDICAL CENTER | Age: 80
DRG: 039 | End: 2025-07-01
Attending: SURGERY
Payer: MEDICARE

## 2025-07-01 DIAGNOSIS — Z01.812 PRE-OPERATIVE LABORATORY EXAMINATION: Primary | ICD-10-CM

## 2025-07-01 NOTE — OR NURSING
Pre-admit phone appt completed with patient. He states he wants to be on the bloodless protocol. Dr. CLIVE Latham office notified and [facility] surg. scheduling at this time. Patient does not use MyChart; surg. instructions including meds, fasting, surg preparations and maps to facility mailed to patient.

## 2025-07-01 NOTE — PREADMIT AVS NOTE
Current Medications   Medication Instructions    doxazosin (CARDURA) 2 MG Tab Continue taking medication as prescribed, including morning of procedure     telmisartan (MICARDIS) 20 MG tablet Stop 24 hours before surgery    Levetiracetam 750 MG TABLET SR 24 HR Continue taking as prescribed.    montelukast (SINGULAIR) 10 MG Tab Continue taking as prescribed.    atorvastatin (LIPITOR) 80 MG tablet Continue taking as prescribed.    celecoxib (CELEBREX) 200 MG Cap Stop 5 days before surgery    aspirin (ASA) 81 MG Chew Tab chewable tablet Follow instructions from surgeon or specialist.    acetaminophen (TYLENOL) 325 MG Tab As needed medication, may take if needed, including morning of procedure     fluticasone (FLONASE) 50 MCG/ACT nasal spray Continue taking as prescribed.    famotidine (PEPCID) 20 MG Tab Continue taking as prescribed.    Azelastine (ASTELIN) 137 MCG/SPRAY Solution Continue taking as prescribed.    albuterol 108 (90 Base) MCG/ACT Aero Soln inhalation aerosol As needed medication, may take if needed, including morning of procedure     fluticasone furoate-vilanterol (BREO ELLIPTA) 100-25 MCG/ACT AEROSOL POWDER, BREATH ACTIVATED Continue taking as prescribed.    tiotropium (SPIRIVA HANDIHALER) 18 MCG Cap Continue taking as prescribed.

## 2025-07-07 ENCOUNTER — PRE-ADMISSION TESTING (OUTPATIENT)
Dept: ADMISSIONS | Facility: MEDICAL CENTER | Age: 80
DRG: 039 | End: 2025-07-07
Attending: SURGERY
Payer: MEDICARE

## 2025-07-07 DIAGNOSIS — Z01.812 PRE-OPERATIVE LABORATORY EXAMINATION: ICD-10-CM

## 2025-07-07 DIAGNOSIS — Z01.810 PRE-OPERATIVE CARDIOVASCULAR EXAMINATION: Primary | ICD-10-CM

## 2025-07-07 LAB
ANION GAP SERPL CALC-SCNC: 14 MMOL/L (ref 7–16)
BASOPHILS # BLD AUTO: 0.7 % (ref 0–1.8)
BASOPHILS # BLD: 0.09 K/UL (ref 0–0.12)
BUN SERPL-MCNC: 19 MG/DL (ref 8–22)
CALCIUM SERPL-MCNC: 8.7 MG/DL (ref 8.5–10.5)
CHLORIDE SERPL-SCNC: 110 MMOL/L (ref 96–112)
CO2 SERPL-SCNC: 17 MMOL/L (ref 20–33)
CREAT SERPL-MCNC: 1.11 MG/DL (ref 0.5–1.4)
EKG IMPRESSION: NORMAL
EOSINOPHIL # BLD AUTO: 0.21 K/UL (ref 0–0.51)
EOSINOPHIL NFR BLD: 1.7 % (ref 0–6.9)
ERYTHROCYTE [DISTWIDTH] IN BLOOD BY AUTOMATED COUNT: 47.4 FL (ref 35.9–50)
GFR SERPLBLD CREATININE-BSD FMLA CKD-EPI: 67 ML/MIN/1.73 M 2
GLUCOSE SERPL-MCNC: 142 MG/DL (ref 65–99)
HCT VFR BLD AUTO: 47.2 % (ref 42–52)
HGB BLD-MCNC: 15.9 G/DL (ref 14–18)
IMM GRANULOCYTES # BLD AUTO: 0.09 K/UL (ref 0–0.11)
IMM GRANULOCYTES NFR BLD AUTO: 0.7 % (ref 0–0.9)
LYMPHOCYTES # BLD AUTO: 2.62 K/UL (ref 1–4.8)
LYMPHOCYTES NFR BLD: 21.5 % (ref 22–41)
MCH RBC QN AUTO: 32.1 PG (ref 27–33)
MCHC RBC AUTO-ENTMCNC: 33.7 G/DL (ref 32.3–36.5)
MCV RBC AUTO: 95.2 FL (ref 81.4–97.8)
MONOCYTES # BLD AUTO: 0.64 K/UL (ref 0–0.85)
MONOCYTES NFR BLD AUTO: 5.2 % (ref 0–13.4)
NEUTROPHILS # BLD AUTO: 8.56 K/UL (ref 1.82–7.42)
NEUTROPHILS NFR BLD: 70.2 % (ref 44–72)
NRBC # BLD AUTO: 0 K/UL
NRBC BLD-RTO: 0 /100 WBC (ref 0–0.2)
PLATELET # BLD AUTO: 197 K/UL (ref 164–446)
PMV BLD AUTO: 10.4 FL (ref 9–12.9)
POTASSIUM SERPL-SCNC: 3.7 MMOL/L (ref 3.6–5.5)
RBC # BLD AUTO: 4.96 M/UL (ref 4.7–6.1)
SODIUM SERPL-SCNC: 141 MMOL/L (ref 135–145)
WBC # BLD AUTO: 12.2 K/UL (ref 4.8–10.8)

## 2025-07-07 PROCEDURE — 85025 COMPLETE CBC W/AUTO DIFF WBC: CPT

## 2025-07-07 PROCEDURE — 80048 BASIC METABOLIC PNL TOTAL CA: CPT

## 2025-07-07 PROCEDURE — 36415 COLL VENOUS BLD VENIPUNCTURE: CPT

## 2025-07-07 PROCEDURE — 93005 ELECTROCARDIOGRAM TRACING: CPT | Mod: TC

## 2025-07-07 PROCEDURE — 93010 ELECTROCARDIOGRAM REPORT: CPT | Performed by: INTERNAL MEDICINE

## 2025-07-11 ENCOUNTER — TELEPHONE (OUTPATIENT)
Dept: NEUROLOGY | Facility: MEDICAL CENTER | Age: 80
End: 2025-07-11
Payer: MEDICARE

## 2025-07-11 NOTE — TELEPHONE ENCOUNTER
Wife called patient thought he was only suppose to take 500 mg of keppra. I reviewed note and per Dr Acosta He is doing well taking Keppra  mg nightly. She verbalized understanding.

## 2025-07-16 ENCOUNTER — ANESTHESIA (OUTPATIENT)
Dept: SURGERY | Facility: MEDICAL CENTER | Age: 80
DRG: 039 | End: 2025-07-16
Payer: MEDICARE

## 2025-07-16 ENCOUNTER — HOSPITAL ENCOUNTER (INPATIENT)
Facility: MEDICAL CENTER | Age: 80
LOS: 1 days | DRG: 039 | End: 2025-07-17
Attending: SURGERY | Admitting: SURGERY
Payer: MEDICARE

## 2025-07-16 ENCOUNTER — ANESTHESIA EVENT (OUTPATIENT)
Dept: SURGERY | Facility: MEDICAL CENTER | Age: 80
DRG: 039 | End: 2025-07-16
Payer: MEDICARE

## 2025-07-16 DIAGNOSIS — G89.18 POSTOPERATIVE PAIN: Primary | ICD-10-CM

## 2025-07-16 PROBLEM — Z98.890 S/P CAROTID ENDARTERECTOMY: Status: ACTIVE | Noted: 2025-07-16

## 2025-07-16 PROCEDURE — 03CK0ZZ EXTIRPATION OF MATTER FROM RIGHT INTERNAL CAROTID ARTERY, OPEN APPROACH: ICD-10-PCS | Performed by: SURGERY

## 2025-07-16 PROCEDURE — 700105 HCHG RX REV CODE 258: Performed by: SURGERY

## 2025-07-16 PROCEDURE — 160002 HCHG RECOVERY MINUTES (STAT): Performed by: SURGERY

## 2025-07-16 PROCEDURE — 700105 HCHG RX REV CODE 258: Performed by: ANESTHESIOLOGY

## 2025-07-16 PROCEDURE — 700101 HCHG RX REV CODE 250: Performed by: ANESTHESIOLOGY

## 2025-07-16 PROCEDURE — 160048 HCHG OR STATISTICAL LEVEL 1-5: Performed by: SURGERY

## 2025-07-16 PROCEDURE — A9270 NON-COVERED ITEM OR SERVICE: HCPCS | Performed by: SURGERY

## 2025-07-16 PROCEDURE — 35301 RECHANNELING OF ARTERY: CPT | Mod: RT | Performed by: SURGERY

## 2025-07-16 PROCEDURE — 770001 HCHG ROOM/CARE - MED/SURG/GYN PRIV*

## 2025-07-16 PROCEDURE — 700111 HCHG RX REV CODE 636 W/ 250 OVERRIDE (IP): Performed by: SURGERY

## 2025-07-16 PROCEDURE — 160193 HCHG PACU STANDARD - 1ST 60 MINS: Performed by: SURGERY

## 2025-07-16 PROCEDURE — C1713 ANCHOR/SCREW BN/BN,TIS/BN: HCPCS | Performed by: SURGERY

## 2025-07-16 PROCEDURE — 700102 HCHG RX REV CODE 250 W/ 637 OVERRIDE(OP): Performed by: SURGERY

## 2025-07-16 PROCEDURE — 03UK0JZ SUPPLEMENT RIGHT INTERNAL CAROTID ARTERY WITH SYNTHETIC SUBSTITUTE, OPEN APPROACH: ICD-10-PCS | Performed by: SURGERY

## 2025-07-16 PROCEDURE — 110371 HCHG SHELL REV 272: Performed by: SURGERY

## 2025-07-16 PROCEDURE — 160039 HCHG SURGERY MINUTES - EA ADDL 1 MIN LEVEL 3: Performed by: SURGERY

## 2025-07-16 PROCEDURE — C1781 MESH (IMPLANTABLE): HCPCS | Performed by: SURGERY

## 2025-07-16 PROCEDURE — 160028 HCHG SURGERY MINUTES - 1ST 30 MINS LEVEL 3: Performed by: SURGERY

## 2025-07-16 PROCEDURE — 160015 HCHG STAT PREOP MINUTES: Performed by: SURGERY

## 2025-07-16 PROCEDURE — 700101 HCHG RX REV CODE 250: Performed by: SURGERY

## 2025-07-16 PROCEDURE — 700111 HCHG RX REV CODE 636 W/ 250 OVERRIDE (IP): Performed by: ANESTHESIOLOGY

## 2025-07-16 PROCEDURE — 160191 HCHG ANESTHESIA STANDARD: Performed by: SURGERY

## 2025-07-16 DEVICE — PATCH .8X8CM XENOSURE BIOLOGIC VASCULAR---ORDER IN MULTIPLES OF 5---: Type: IMPLANTABLE DEVICE | Site: NECK | Status: FUNCTIONAL

## 2025-07-16 RX ORDER — FLUTICASONE PROPIONATE 50 MCG
1 SPRAY, SUSPENSION (ML) NASAL 2 TIMES DAILY PRN
Status: DISCONTINUED | OUTPATIENT
Start: 2025-07-16 | End: 2025-07-16

## 2025-07-16 RX ORDER — ALBUTEROL SULFATE 5 MG/ML
2.5 SOLUTION RESPIRATORY (INHALATION)
Status: DISCONTINUED | OUTPATIENT
Start: 2025-07-16 | End: 2025-07-16 | Stop reason: HOSPADM

## 2025-07-16 RX ORDER — SODIUM CHLORIDE 9 MG/ML
INJECTION, SOLUTION INTRAVENOUS CONTINUOUS
Status: DISCONTINUED | OUTPATIENT
Start: 2025-07-16 | End: 2025-07-16

## 2025-07-16 RX ORDER — HYDROMORPHONE HYDROCHLORIDE 1 MG/ML
0.4 INJECTION, SOLUTION INTRAMUSCULAR; INTRAVENOUS; SUBCUTANEOUS
Status: DISCONTINUED | OUTPATIENT
Start: 2025-07-16 | End: 2025-07-16 | Stop reason: HOSPADM

## 2025-07-16 RX ORDER — TELMISARTAN 40 MG/1
20 TABLET ORAL DAILY
Status: DISCONTINUED | OUTPATIENT
Start: 2025-07-17 | End: 2025-07-17 | Stop reason: HOSPADM

## 2025-07-16 RX ORDER — PHENYLEPHRINE HYDROCHLORIDE 10 MG/ML
INJECTION, SOLUTION INTRAMUSCULAR; INTRAVENOUS; SUBCUTANEOUS PRN
Status: DISCONTINUED | OUTPATIENT
Start: 2025-07-16 | End: 2025-07-16 | Stop reason: HOSPADM

## 2025-07-16 RX ORDER — MONTELUKAST SODIUM 10 MG/1
10 TABLET ORAL DAILY
Status: DISCONTINUED | OUTPATIENT
Start: 2025-07-17 | End: 2025-07-17 | Stop reason: HOSPADM

## 2025-07-16 RX ORDER — DOCUSATE SODIUM 100 MG/1
100 CAPSULE, LIQUID FILLED ORAL 2 TIMES DAILY
Status: DISCONTINUED | OUTPATIENT
Start: 2025-07-16 | End: 2025-07-17 | Stop reason: HOSPADM

## 2025-07-16 RX ORDER — DEXAMETHASONE SODIUM PHOSPHATE 4 MG/ML
INJECTION, SOLUTION INTRA-ARTICULAR; INTRALESIONAL; INTRAMUSCULAR; INTRAVENOUS; SOFT TISSUE PRN
Status: DISCONTINUED | OUTPATIENT
Start: 2025-07-16 | End: 2025-07-16 | Stop reason: SURG

## 2025-07-16 RX ORDER — NICOTINE 21 MG/24HR
1 PATCH, TRANSDERMAL 24 HOURS TRANSDERMAL EVERY 24 HOURS
COMMUNITY

## 2025-07-16 RX ORDER — SODIUM CHLORIDE, SODIUM LACTATE, POTASSIUM CHLORIDE, CALCIUM CHLORIDE 600; 310; 30; 20 MG/100ML; MG/100ML; MG/100ML; MG/100ML
INJECTION, SOLUTION INTRAVENOUS
Status: DISCONTINUED | OUTPATIENT
Start: 2025-07-16 | End: 2025-07-16 | Stop reason: SURG

## 2025-07-16 RX ORDER — HEPARIN SODIUM,PORCINE 1000/ML
VIAL (ML) INJECTION
Status: DISCONTINUED | OUTPATIENT
Start: 2025-07-16 | End: 2025-07-16 | Stop reason: HOSPADM

## 2025-07-16 RX ORDER — HYDROMORPHONE HYDROCHLORIDE 1 MG/ML
0.1 INJECTION, SOLUTION INTRAMUSCULAR; INTRAVENOUS; SUBCUTANEOUS
Status: DISCONTINUED | OUTPATIENT
Start: 2025-07-16 | End: 2025-07-16 | Stop reason: HOSPADM

## 2025-07-16 RX ORDER — FAMOTIDINE 20 MG/1
20 TABLET, FILM COATED ORAL 2 TIMES DAILY
Status: DISCONTINUED | OUTPATIENT
Start: 2025-07-16 | End: 2025-07-17 | Stop reason: HOSPADM

## 2025-07-16 RX ORDER — SODIUM CHLORIDE, SODIUM LACTATE, POTASSIUM CHLORIDE, CALCIUM CHLORIDE 600; 310; 30; 20 MG/100ML; MG/100ML; MG/100ML; MG/100ML
INJECTION, SOLUTION INTRAVENOUS CONTINUOUS
Status: ACTIVE | OUTPATIENT
Start: 2025-07-16 | End: 2025-07-16

## 2025-07-16 RX ORDER — ACETAMINOPHEN 325 MG/1
650 TABLET ORAL EVERY 6 HOURS PRN
Status: DISCONTINUED | OUTPATIENT
Start: 2025-07-16 | End: 2025-07-17 | Stop reason: HOSPADM

## 2025-07-16 RX ORDER — LIDOCAINE HYDROCHLORIDE 10 MG/ML
INJECTION, SOLUTION INFILTRATION; PERINEURAL
Status: DISCONTINUED | OUTPATIENT
Start: 2025-07-16 | End: 2025-07-16 | Stop reason: HOSPADM

## 2025-07-16 RX ORDER — SODIUM CHLORIDE 9 MG/ML
INJECTION, SOLUTION INTRAVENOUS CONTINUOUS
Status: DISCONTINUED | OUTPATIENT
Start: 2025-07-16 | End: 2025-07-17 | Stop reason: HOSPADM

## 2025-07-16 RX ORDER — AZELASTINE HYDROCHLORIDE 137 UG/1
1 SPRAY, METERED NASAL DAILY
Status: DISCONTINUED | OUTPATIENT
Start: 2025-07-17 | End: 2025-07-16

## 2025-07-16 RX ORDER — HEPARIN SODIUM,PORCINE 1000/ML
VIAL (ML) INJECTION PRN
Status: DISCONTINUED | OUTPATIENT
Start: 2025-07-16 | End: 2025-07-16 | Stop reason: SURG

## 2025-07-16 RX ORDER — HYDRALAZINE HYDROCHLORIDE 20 MG/ML
10 INJECTION INTRAMUSCULAR; INTRAVENOUS EVERY 4 HOURS PRN
Status: DISCONTINUED | OUTPATIENT
Start: 2025-07-16 | End: 2025-07-17 | Stop reason: HOSPADM

## 2025-07-16 RX ORDER — MIDAZOLAM HYDROCHLORIDE 1 MG/ML
1 INJECTION INTRAMUSCULAR; INTRAVENOUS
Status: DISCONTINUED | OUTPATIENT
Start: 2025-07-16 | End: 2025-07-16 | Stop reason: HOSPADM

## 2025-07-16 RX ORDER — DOXAZOSIN 2 MG/1
2 TABLET ORAL 2 TIMES DAILY
Status: DISCONTINUED | OUTPATIENT
Start: 2025-07-16 | End: 2025-07-17 | Stop reason: HOSPADM

## 2025-07-16 RX ORDER — OXYCODONE HCL 5 MG/5 ML
10 SOLUTION, ORAL ORAL
Status: DISCONTINUED | OUTPATIENT
Start: 2025-07-16 | End: 2025-07-16 | Stop reason: HOSPADM

## 2025-07-16 RX ORDER — CEFAZOLIN SODIUM 1 G/3ML
INJECTION, POWDER, FOR SOLUTION INTRAMUSCULAR; INTRAVENOUS PRN
Status: DISCONTINUED | OUTPATIENT
Start: 2025-07-16 | End: 2025-07-16 | Stop reason: SURG

## 2025-07-16 RX ORDER — MEPERIDINE HYDROCHLORIDE 50 MG/ML
12.5 INJECTION INTRAMUSCULAR; INTRAVENOUS; SUBCUTANEOUS
Status: DISCONTINUED | OUTPATIENT
Start: 2025-07-16 | End: 2025-07-16 | Stop reason: HOSPADM

## 2025-07-16 RX ORDER — BUPIVACAINE HYDROCHLORIDE AND EPINEPHRINE 2.5; 5 MG/ML; UG/ML
INJECTION, SOLUTION EPIDURAL; INFILTRATION; INTRACAUDAL; PERINEURAL
Status: DISCONTINUED | OUTPATIENT
Start: 2025-07-16 | End: 2025-07-16 | Stop reason: HOSPADM

## 2025-07-16 RX ORDER — ATORVASTATIN CALCIUM 80 MG/1
80 TABLET, FILM COATED ORAL NIGHTLY
Status: DISCONTINUED | OUTPATIENT
Start: 2025-07-16 | End: 2025-07-17 | Stop reason: HOSPADM

## 2025-07-16 RX ORDER — OXYCODONE HCL 5 MG/5 ML
5 SOLUTION, ORAL ORAL
Status: DISCONTINUED | OUTPATIENT
Start: 2025-07-16 | End: 2025-07-16 | Stop reason: HOSPADM

## 2025-07-16 RX ORDER — ALBUTEROL SULFATE 90 UG/1
2 INHALANT RESPIRATORY (INHALATION) EVERY 6 HOURS PRN
Status: DISCONTINUED | OUTPATIENT
Start: 2025-07-16 | End: 2025-07-17 | Stop reason: HOSPADM

## 2025-07-16 RX ORDER — NICOTINE 21 MG/24HR
1 PATCH, TRANSDERMAL 24 HOURS TRANSDERMAL EVERY 24 HOURS
Status: DISCONTINUED | OUTPATIENT
Start: 2025-07-16 | End: 2025-07-17 | Stop reason: HOSPADM

## 2025-07-16 RX ORDER — SODIUM CHLORIDE, SODIUM LACTATE, POTASSIUM CHLORIDE, CALCIUM CHLORIDE 600; 310; 30; 20 MG/100ML; MG/100ML; MG/100ML; MG/100ML
INJECTION, SOLUTION INTRAVENOUS CONTINUOUS
Status: DISCONTINUED | OUTPATIENT
Start: 2025-07-16 | End: 2025-07-16 | Stop reason: HOSPADM

## 2025-07-16 RX ORDER — LIDOCAINE HYDROCHLORIDE 20 MG/ML
INJECTION, SOLUTION EPIDURAL; INFILTRATION; INTRACAUDAL; PERINEURAL PRN
Status: DISCONTINUED | OUTPATIENT
Start: 2025-07-16 | End: 2025-07-16 | Stop reason: SURG

## 2025-07-16 RX ORDER — LABETALOL HYDROCHLORIDE 5 MG/ML
10 INJECTION, SOLUTION INTRAVENOUS EVERY 4 HOURS PRN
Status: DISCONTINUED | OUTPATIENT
Start: 2025-07-16 | End: 2025-07-17 | Stop reason: HOSPADM

## 2025-07-16 RX ORDER — CAPSAICIN 0.025 %
1 CREAM (GRAM) TOPICAL 3 TIMES DAILY
COMMUNITY

## 2025-07-16 RX ORDER — ONDANSETRON 2 MG/ML
INJECTION INTRAMUSCULAR; INTRAVENOUS PRN
Status: DISCONTINUED | OUTPATIENT
Start: 2025-07-16 | End: 2025-07-16 | Stop reason: SURG

## 2025-07-16 RX ORDER — DIPHENHYDRAMINE HYDROCHLORIDE 50 MG/ML
12.5 INJECTION, SOLUTION INTRAMUSCULAR; INTRAVENOUS
Status: DISCONTINUED | OUTPATIENT
Start: 2025-07-16 | End: 2025-07-16 | Stop reason: HOSPADM

## 2025-07-16 RX ORDER — HYDROMORPHONE HYDROCHLORIDE 1 MG/ML
0.2 INJECTION, SOLUTION INTRAMUSCULAR; INTRAVENOUS; SUBCUTANEOUS
Status: DISCONTINUED | OUTPATIENT
Start: 2025-07-16 | End: 2025-07-16 | Stop reason: HOSPADM

## 2025-07-16 RX ORDER — HYDRALAZINE HYDROCHLORIDE 20 MG/ML
5 INJECTION INTRAMUSCULAR; INTRAVENOUS
Status: DISCONTINUED | OUTPATIENT
Start: 2025-07-16 | End: 2025-07-16 | Stop reason: HOSPADM

## 2025-07-16 RX ORDER — MORPHINE SULFATE 4 MG/ML
1-2 INJECTION INTRAVENOUS
Status: DISCONTINUED | OUTPATIENT
Start: 2025-07-16 | End: 2025-07-17 | Stop reason: HOSPADM

## 2025-07-16 RX ORDER — ESMOLOL HYDROCHLORIDE 10 MG/ML
INJECTION INTRAVENOUS PRN
Status: DISCONTINUED | OUTPATIENT
Start: 2025-07-16 | End: 2025-07-16 | Stop reason: SURG

## 2025-07-16 RX ORDER — HALOPERIDOL 5 MG/ML
1 INJECTION INTRAMUSCULAR
Status: DISCONTINUED | OUTPATIENT
Start: 2025-07-16 | End: 2025-07-16 | Stop reason: HOSPADM

## 2025-07-16 RX ORDER — ASPIRIN 81 MG/1
81 TABLET, CHEWABLE ORAL DAILY
Status: DISCONTINUED | OUTPATIENT
Start: 2025-07-17 | End: 2025-07-17 | Stop reason: HOSPADM

## 2025-07-16 RX ORDER — MIDAZOLAM HYDROCHLORIDE 1 MG/ML
INJECTION INTRAMUSCULAR; INTRAVENOUS PRN
Status: DISCONTINUED | OUTPATIENT
Start: 2025-07-16 | End: 2025-07-16 | Stop reason: SURG

## 2025-07-16 RX ORDER — EPHEDRINE SULFATE 50 MG/ML
INJECTION, SOLUTION INTRAVENOUS PRN
Status: DISCONTINUED | OUTPATIENT
Start: 2025-07-16 | End: 2025-07-16 | Stop reason: SURG

## 2025-07-16 RX ORDER — LIDOCAINE HYDROCHLORIDE 40 MG/ML
SOLUTION TOPICAL PRN
Status: DISCONTINUED | OUTPATIENT
Start: 2025-07-16 | End: 2025-07-16 | Stop reason: SURG

## 2025-07-16 RX ORDER — ONDANSETRON 2 MG/ML
4 INJECTION INTRAMUSCULAR; INTRAVENOUS EVERY 4 HOURS PRN
Status: DISCONTINUED | OUTPATIENT
Start: 2025-07-16 | End: 2025-07-17 | Stop reason: HOSPADM

## 2025-07-16 RX ORDER — HYDROCODONE BITARTRATE AND ACETAMINOPHEN 5; 325 MG/1; MG/1
1-2 TABLET ORAL EVERY 6 HOURS PRN
Status: DISCONTINUED | OUTPATIENT
Start: 2025-07-16 | End: 2025-07-17 | Stop reason: HOSPADM

## 2025-07-16 RX ADMIN — EPHEDRINE SULFATE 2.5 MG: 50 INJECTION, SOLUTION INTRAVENOUS at 13:22

## 2025-07-16 RX ADMIN — LIDOCAINE HYDROCHLORIDE 100 MG: 20 INJECTION, SOLUTION EPIDURAL; INFILTRATION; INTRACAUDAL; PERINEURAL at 12:51

## 2025-07-16 RX ADMIN — ESMOLOL HYDROCHLORIDE 10 MG: 100 INJECTION, SOLUTION INTRAVENOUS at 13:48

## 2025-07-16 RX ADMIN — SODIUM CHLORIDE, POTASSIUM CHLORIDE, SODIUM LACTATE AND CALCIUM CHLORIDE: 600; 310; 30; 20 INJECTION, SOLUTION INTRAVENOUS at 18:41

## 2025-07-16 RX ADMIN — SODIUM CHLORIDE: 9 INJECTION, SOLUTION INTRAVENOUS at 22:22

## 2025-07-16 RX ADMIN — LIDOCAINE HYDROCHLORIDE 4 ML: 40 SOLUTION TOPICAL at 12:52

## 2025-07-16 RX ADMIN — PHENYLEPHRINE HYDROCHLORIDE 100 MCG: 10 INJECTION INTRAVENOUS at 13:33

## 2025-07-16 RX ADMIN — SODIUM CHLORIDE, POTASSIUM CHLORIDE, SODIUM LACTATE AND CALCIUM CHLORIDE: 600; 310; 30; 20 INJECTION, SOLUTION INTRAVENOUS at 12:46

## 2025-07-16 RX ADMIN — PHENYLEPHRINE HYDROCHLORIDE 100 MCG: 10 INJECTION INTRAVENOUS at 13:20

## 2025-07-16 RX ADMIN — FENTANYL CITRATE 100 MCG: 50 INJECTION, SOLUTION INTRAMUSCULAR; INTRAVENOUS at 14:04

## 2025-07-16 RX ADMIN — EPHEDRINE SULFATE 2.5 MG: 50 INJECTION, SOLUTION INTRAVENOUS at 13:26

## 2025-07-16 RX ADMIN — HYDROCODONE BITARTRATE AND ACETAMINOPHEN 1 TABLET: 5; 325 TABLET ORAL at 22:30

## 2025-07-16 RX ADMIN — PROPOFOL 150 MG: 10 INJECTION, EMULSION INTRAVENOUS at 12:51

## 2025-07-16 RX ADMIN — HEPARIN SODIUM 5000 UNITS: 1000 INJECTION, SOLUTION INTRAVENOUS; SUBCUTANEOUS at 13:15

## 2025-07-16 RX ADMIN — FENTANYL CITRATE 50 MCG: 50 INJECTION, SOLUTION INTRAMUSCULAR; INTRAVENOUS at 13:47

## 2025-07-16 RX ADMIN — ONDANSETRON 8 MG: 2 INJECTION INTRAMUSCULAR; INTRAVENOUS at 13:53

## 2025-07-16 RX ADMIN — ROCURONIUM BROMIDE 100 MG: 10 INJECTION, SOLUTION INTRAVENOUS at 12:51

## 2025-07-16 RX ADMIN — ATORVASTATIN CALCIUM 80 MG: 80 TABLET, FILM COATED ORAL at 22:14

## 2025-07-16 RX ADMIN — SUGAMMADEX 200 MG: 100 INJECTION, SOLUTION INTRAVENOUS at 14:02

## 2025-07-16 RX ADMIN — CEFAZOLIN 2 G: 1 INJECTION, POWDER, FOR SOLUTION INTRAMUSCULAR; INTRAVENOUS at 12:51

## 2025-07-16 RX ADMIN — FENTANYL CITRATE 50 MCG: 50 INJECTION, SOLUTION INTRAMUSCULAR; INTRAVENOUS at 13:46

## 2025-07-16 RX ADMIN — PHENYLEPHRINE HYDROCHLORIDE 100 MCG: 10 INJECTION INTRAVENOUS at 13:27

## 2025-07-16 RX ADMIN — DEXAMETHASONE SODIUM PHOSPHATE 8 MG: 4 INJECTION INTRA-ARTICULAR; INTRALESIONAL; INTRAMUSCULAR; INTRAVENOUS; SOFT TISSUE at 12:51

## 2025-07-16 RX ADMIN — CEFAZOLIN 2 G: 2 INJECTION, POWDER, FOR SOLUTION INTRAVENOUS at 22:24

## 2025-07-16 RX ADMIN — PHENYLEPHRINE HYDROCHLORIDE 100 MCG: 10 INJECTION INTRAVENOUS at 13:15

## 2025-07-16 RX ADMIN — MIDAZOLAM HYDROCHLORIDE 2 MG: 1 INJECTION, SOLUTION INTRAMUSCULAR; INTRAVENOUS at 12:46

## 2025-07-16 RX ADMIN — EPHEDRINE SULFATE 5 MG: 50 INJECTION, SOLUTION INTRAVENOUS at 13:17

## 2025-07-16 ASSESSMENT — SOCIAL DETERMINANTS OF HEALTH (SDOH)

## 2025-07-16 ASSESSMENT — PAIN DESCRIPTION - PAIN TYPE
TYPE: SURGICAL PAIN
TYPE: ACUTE PAIN;SURGICAL PAIN
TYPE: SURGICAL PAIN
TYPE: ACUTE PAIN;SURGICAL PAIN
TYPE: SURGICAL PAIN

## 2025-07-16 ASSESSMENT — LIFESTYLE VARIABLES
HAVE PEOPLE ANNOYED YOU BY CRITICIZING YOUR DRINKING: NO
EVER HAD A DRINK FIRST THING IN THE MORNING TO STEADY YOUR NERVES TO GET RID OF A HANGOVER: NO
TOTAL SCORE: 0
DOES PATIENT WANT TO STOP DRINKING: NO
CONSUMPTION TOTAL: NEGATIVE
TOTAL SCORE: 0
HAVE YOU EVER FELT YOU SHOULD CUT DOWN ON YOUR DRINKING: NO
EVER FELT BAD OR GUILTY ABOUT YOUR DRINKING: NO
AVERAGE NUMBER OF DAYS PER WEEK YOU HAVE A DRINK CONTAINING ALCOHOL: 0
ON A TYPICAL DAY WHEN YOU DRINK ALCOHOL HOW MANY DRINKS DO YOU HAVE: 0
ALCOHOL_USE: NO
TOTAL SCORE: 0
HOW MANY TIMES IN THE PAST YEAR HAVE YOU HAD 5 OR MORE DRINKS IN A DAY: 0

## 2025-07-16 ASSESSMENT — FIBROSIS 4 INDEX
FIB4 SCORE: 2.47
FIB4 SCORE: 2.47

## 2025-07-16 ASSESSMENT — PATIENT HEALTH QUESTIONNAIRE - PHQ9
1. LITTLE INTEREST OR PLEASURE IN DOING THINGS: NOT AT ALL
SUM OF ALL RESPONSES TO PHQ9 QUESTIONS 1 AND 2: 0
2. FEELING DOWN, DEPRESSED, IRRITABLE, OR HOPELESS: NOT AT ALL

## 2025-07-16 NOTE — ANESTHESIA PROCEDURE NOTES
Arterial Line    Performed by: Ghanshyam Hodge M.D.  Authorized by: Ghanshyam Hodge M.D.    Start Time:  7/16/2025 12:56 PM  End Time:  7/16/2025 12:58 PM  Localization: ultrasound guidance  Image captured, interpreted and electronically stored.  Patient Location:  OR  Indication: continuous blood pressure monitoring        Catheter Size:  20 G  Seldinger Technique?: Yes    Laterality:  Left  Site:  Radial artery  Line Secured:  Antimicrobial disc, tape and transparent dressing  Events: patient tolerated procedure well with no complications

## 2025-07-16 NOTE — PROGRESS NOTES
Pharmacy Medication Reconciliation      ~Medication reconciliation updated and complete per patient   ~Allergies have been verified and updated   ~No outpatient Antimicrobials in the last 30 days  ~Is dispense history available in EPIC: yes  ~Patient home pharmacy :  [facility] [phone]      ~Anticoagulants (rivaroxaban, apixaban, edoxaban, dabigatran, warfarin, enoxaparin) taken in the last 14 days? NO  ~

## 2025-07-16 NOTE — ANESTHESIA TIME REPORT
Anesthesia Start and Stop Event Times       Date Time Event    7/16/2025 1230 Ready for Procedure     1246 Anesthesia Start     1413 Anesthesia Stop          Responsible Staff  07/16/25      Name Role Begin End    Ghanshyam Hodge M.D. Anesth 1246 1413          Overtime Reason:  no overtime (within assigned shift)    Comments:

## 2025-07-16 NOTE — OP REPORT
DATE OF SERVICE:  07/16/2025     SURGEON:  Evelina Latham MD     ASSISTANT:  Mary Henderson PA-C     ANESTHESIOLOGIST:  Ghanshyam Hodge MD     TYPE OF ANESTHESIA:  General anesthesia and local anesthesia with 10 mL of   0.5% Marcaine with epinephrine solution.     PREOPERATIVE DIAGNOSIS:  Severe right carotid artery stenosis.     POSTOPERATIVE DIAGNOSIS:  Severe right carotid artery stenosis.     PROCEDURES:  Right carotid endarterectomy with bovine patch angioplasty.     INDICATIONS FOR PROCEDURE:  This is a pleasant 79-year-old male with multiple   medical problems, who on recent carotid duplex was found to have severe left   carotid artery stenosis.  Discussion was made with the patient.  He would like   to undergo right carotid surgery, fully understanding all risks.     DESCRIPTION OF PROCEDURE:  Informed consent was obtained.  The patient was   taken to the operating room and was placed in the supine position.  Sequential   compression devices were applied.  The patient was given Ancef intravenously.    General anesthesia was induced.     Next, his right neck and chest were sterilely prepped and draped in a normal   fashion.  A time-out procedure was done.  The skin and subcutaneous tissue in   the neck were anesthetized with 10 mL of Marcaine solution.  An incision was made   along the skin line.  The incision was extended subcutaneous tissue and   platysma muscle using the electrocautery.  The sternocleidomastoid muscle was   dissected on its medial aspect.  The internal jugular vein was also dissected   medially.  The facial vein and other venous tributary were ligated with 3-0   silk ties, clipped with hemoclips, and sharply divided.     Next, the common carotid artery was identified and carefully dissected.  The   carotid bifurcation was not dissected to avoid the risk of atheroembolization.   A carotid sinus nerve block was performed using 1 mL of 1% lidocaine   solution.  A robot retractor was used.   Care was taken during the dissection   bursa to avoid injury to the nerves.     The patient was given heparin 5000 units intravenously.  At the same time, his   mean arterial pressure was maintained in the  range.  After the heparin   was allowed to circulate systemically for 3 minutes, the common carotid   artery was clamped with vascular clamps.  The distal internal carotid artery   was dissected free from the surrounding tissues and a vascular clamp was   placed over the normal distal internal carotid artery.  The external carotid   artery was also circumferentially dissected free at its origin and a vessel   loop was doubly placed around the external carotid artery and used for   vascular control.      An arteriotomy was made on the anterolateral aspect of the   common carotid artery extending into the internal carotid artery.  Upon   entering the arterial lumen, there was severe (80%) stenosis seen at the   bifurcation and proximal internal carotid artery.  At this point, the vascular   clamp on the normal distal internal carotid artery was temporarily released   and excellent backbleeding was seen; therefore, no shunt was used for the   remaining of the case.     Thromboendarterectomy of the common carotid and internal carotid arteries was   performed.  Thromboendarterectomy of the external carotid artery was also   performed.  The intima on the distal internal carotid artery and proximal   common carotid artery were tacked with interrupted 7-0 Prolene sutures.  The   arterial lumen was thoroughly irrigated with heparinized saline solution and   was found to be free of any debris.     A sterile bovine pericardial patch was brought into the operative field and a   patch angioplasty was performed from the common carotid artery, extended into   the internal carotid artery using running 6-0 Prolene sutures.  Prior to   completing the patch angioplasty, backbleeding and flushing were obtained.    The arterial  lumen was also thoroughly irrigated with heparinized saline   solution.  The patch angioplasty was completed.  Flow was first restored into   the external carotid followed by into the internal carotid arteries.     A sterile Doppler probe was brought into the operative field.  Excellent   Doppler flow signals were obtained over the common carotid artery, carotid   thromboendarterectomy site, internal and external carotid arteries.     The wound was irrigated and was found to have excellent hemostasis.  Surgiflo   was also placed over the patch angioplasty site.     Through a separate stab incision inferiorly, a 7 mm Dereck-Del Rosario drain was   brought into the operative field, cut to appropriate length, and secured to   the skin with 3-0 nylon suture.  The platysma muscle was reapproximated over   the drain with running 3-0 Vicryl suture.  The skin edges were reapproximated   with 4-0 Monocryl suture in a subcuticular manner.  The wounds were cleaned   and sterile dressing was applied.     ESTIMATED BLOOD LOSS: 20 mL.     INTRAVENOUS FLUID: 1 liter.     COUNTS:  Sponge and instrument count was correct x2.     The patient was then awakened, extubated, taken to the recovery area in stable   condition with intact baseline neurological examination.        ______________________________  MD MICH Tucker/KIARA    DD:  07/16/2025 14:21  DT:  07/16/2025 15:21    Job#:  [id number]

## 2025-07-16 NOTE — ANESTHESIA PROCEDURE NOTES
Airway    Date/Time: 7/16/2025 12:52 PM    Performed by: Ghanshyam Hodge M.D.  Authorized by: Ghanshyam Hodge M.D.    Location:  OR  Urgency:  Elective  Difficult Airway: No    Indications for Airway Management:  Anesthesia      Spontaneous Ventilation: absent    Sedation Level:  Deep  Preoxygenated: Yes    Patient Position:  Sniffing  Final Airway Type:  Endotracheal airway  Final Endotracheal Airway:  ETT  Cuffed: Yes    Technique Used for Successful ETT Placement:  Direct laryngoscopy    Insertion Site:  Oral  Blade Type:  Alvin  Laryngoscope Blade/Videolaryngoscope Blade Size:  4  ETT Size (mm):  7.5  Measured from:  Gums  ETT to Gums (cm):  23  Placement Verified by: auscultation and capnometry    Cormack-Lehane Classification:  Grade I - full view of glottis  Number of Attempts at Approach:  1

## 2025-07-16 NOTE — ANESTHESIA PREPROCEDURE EVALUATION
Case: [id number] Anesthesia Start Date/Time: 07/16/25 1246    Procedure: RIGHT CAROTID ENDARTERECTOMY (Neck)    Anesthesia type: general    Pre-op diagnosis: RIGHT CAROTID STENOSIS    Location: [facility] OR  / SURGERY [facility]    Surgeons: Evelina Latham M.D.            Relevant Problems   PULMONARY   (positive) COPD (chronic obstructive pulmonary disease) (HCC)      CARDIAC   (positive) Bilateral femoral artery stenosis (HCC)   (positive) PAD (peripheral artery disease) ([facility])   (positive) Right-sided extracranial carotid artery stenosis      GI   (positive) GERD (gastroesophageal reflux disease)      Other   (positive) Hyperlipidemia   (positive) Seizure-like activity ([facility])   (positive) Tobacco use       Physical Exam    Airway   Mallampati: II  TM distance: >3 FB  Neck ROM: full       Cardiovascular - normal exam  Rhythm: regular  Rate: normal    (-) murmur     Dental     (+) upper dentures, lower dentures           Pulmonary - normal examBreath sounds clear to auscultation     Abdominal    Neurological - normal exam                   Anesthesia Plan    ASA 2       Plan - general       Airway plan will be ETT          Induction: intravenous    Postoperative Plan: Postoperative administration of opioids is intended.    Pertinent diagnostic labs and testing reviewed    Informed Consent:    Anesthetic plan and risks discussed with patient.    Use of blood products discussed with: patient whom consented to blood products.

## 2025-07-16 NOTE — OR SURGEON
Immediate Post OP Note    PreOp Diagnosis: Severe right carotid artery stenosis.      PostOp Diagnosis: Same.      Procedure(s):  Right carotid endarterectomy with patch angioplasty - Wound Class: Clean with Drain    Surgeon(s):  Evelina Latham M.D.    Anesthesiologist/Type of Anesthesia:  Anesthesiologist: Ghanshyam Hodge M.D./General    Surgical Staff:  Circulator: Chidi Gutierres R.N.  Operating Room Assistant (ORA): Sumeet Ann  Scrub Person: Tre Greco  First Assist: Mary Henderson P.A.-C.    Specimens removed if any:  * No specimens in log *    Estimated Blood Loss: 20 mL.    IV fluids: 1 L.    Findings: Severe stenosis.    Complications: None.    Dictated, #[id number].        7/16/2025 2:14 PM Evelina Latham M.D.

## 2025-07-16 NOTE — ANESTHESIA POSTPROCEDURE EVALUATION
Patient: Josh Pang    Procedure Summary       Date: 07/16/25 Room / Location: [place] [address] / SURGERY [facility]    Anesthesia Start: 1246 Anesthesia Stop: 1413    Procedure: RIGHT CAROTID ENDARTERECTOMY (Neck) Diagnosis: (RIGHT CAROTID STENOSIS)    Surgeons: Evelina Latham M.D. Responsible Provider: Ghanshyam Hodge M.D.    Anesthesia Type: general ASA Status: 2            Final Anesthesia Type: general  Last vitals  BP   Blood Pressure : 109/52    Temp   36.7 °C (98 °F)    Pulse   72   Resp   17    SpO2   92 %      Anesthesia Post Evaluation    Patient location during evaluation: PACU  Patient participation: complete - patient participated  Level of consciousness: awake and alert    Airway patency: patent  Anesthetic complications: no  Cardiovascular status: hemodynamically stable  Respiratory status: acceptable  Hydration status: euvolemic    PONV: none          No notable events documented.     Nurse Pain Score: 0 (NPRS)

## 2025-07-16 NOTE — OR NURSING
Patient realized he had made a mistake when he said he wanted to be bloodless and wants that taken off his chart. He is ok receiving blood transfusions.

## 2025-07-16 NOTE — OR NURSING
Patient resting comfortably, denies pain and nausea. Patient states he is prepared to be transferred to his room. Strict bed rest implemented per order. JEMMA drain milked per order. Patient's wife updated via phone. VSS. Surgical dressing CDI. Report provided to Carolina Kaur RN. Patient transported off unit with CNA on 5L O2 @ 93%. All personal belongings transported with patient.

## 2025-07-16 NOTE — H&P
"Referring Provider: Ezio Mcmillan MD     Consulting Physician: Evelina Latham MD - [facility]      -------------------------------------------------------------------------------------------------     Reason for consultation:  Severe right carotid artery stenosis.     HPI:  This is a 79 y.o. right-handed male with multiple medical problems, history of seizure, who was found to have 80% right internal carotid artery stenosis on CTA performed recently.  Patient denies history of stroke or any neurological symptom except for history of seizure with the last episode back in March of this year.  Patient is a smoker, now down to half a pack a day.  He is in the process of trying to stop smoking.  Patient was also diagnosed with peripheral arterial disease but tells me that he has essentially no problem with ambulation.  He would go \"rounds in [facility]\" with no issue.     Patient is on aspirin and statin.     [Past Medical History]    [Past Medical History]       Diagnosis Date    CATARACT       right     GERD (gastroesophageal reflux disease)      Heart murmur      Hepatitis C      IVDU (intravenous drug user) 8/18/2009    Migraine      Psychiatric disorder       depression    Ulcer       1970's         [Past Surgical History]    [Past Surgical History]        Procedure Laterality Date    CHOLECYSTECTOMY         4 years ago    OPEN REDUCTION         ribs     OTHER         gerd    OTHER         catarract bilat 6/2012        [Current Medications]    [Current Medications]         Current Outpatient Medications   Medication Sig Dispense Refill    telmisartan (MICARDIS) 20 MG tablet Take 1 Tablet by mouth every day. to lower blood pressure 100 Tablet 3    Levetiracetam 750 MG TABLET SR 24 HR Take 750 mg by mouth every day for 180 days. 90 Tablet 1    montelukast (SINGULAIR) 10 MG Tab Take 1 tablet by mouth once daily 90 Tablet 1    atorvastatin (LIPITOR) 80 MG tablet Take 1 Tablet by mouth every evening. 100 Tablet 3    " celecoxib (CELEBREX) 200 MG Cap          aspirin (ASA) 81 MG Chew Tab chewable tablet Chew 1 Tablet every day. 100 Tablet 1    acetaminophen (TYLENOL) 325 MG Tab Take 325 mg by mouth 2 times a day.        fluticasone (FLONASE) 50 MCG/ACT nasal spray Administer 1 Spray into affected nostril(S) 2 times a day as needed.        famotidine (PEPCID) 20 MG Tab Take 20 mg by mouth 2 times a day.        Azelastine (ASTELIN) 137 MCG/SPRAY Solution Administer 1 Spray into affected nostril(S) every day. 30 mL 11    albuterol 108 (90 Base) MCG/ACT Aero Soln inhalation aerosol Inhale 2 Puffs every 6 hours as needed for Shortness of Breath. 18 g 11    fluticasone furoate-vilanterol (BREO ELLIPTA) 100-25 MCG/ACT AEROSOL POWDER, BREATH ACTIVATED Inhale 1 Puff every day. 180 Each 3    tiotropium (SPIRIVA HANDIHALER) 18 MCG Cap Place 1 Capsule into inhaler and inhale every day. 30 Capsule 3    doxazosin (CARDURA) 2 MG Tab Take 1 Tablet by mouth 2 times a day. 180 Tablet 3      No current facility-administered medications for this visit.        Social History               Socioeconomic History    Marital status: Single       Spouse name: Not on file    Number of children: Not on file    Years of education: Not on file    Highest education level: Not on file   Occupational History    Not on file   Tobacco Use    Smoking status: Every Day       Current packs/day: 0.50       Average packs/day: 0.5 packs/day for 59.4 years (29.7 ttl pk-yrs)       Types: Cigarettes       Start date: 1966    Smokeless tobacco: Never   Vaping Use    Vaping status: Never Used   Substance and Sexual Activity    Alcohol use: No       Comment: drinks ETOH occasionally    Drug use: No    Sexual activity: Not on file   Other Topics Concern    Not on file   Social History Narrative     ** Merged History Encounter **            Social Drivers of Health           Financial Resource Strain: Low Risk  (11/6/2024)     Overall Financial Resource Strain (CARDIA)       Difficulty of Paying Living Expenses: Not very hard   Food Insecurity: No Food Insecurity (11/6/2024)     Hunger Vital Sign      Worried About Running Out of Food in the Last Year: Never true      Ran Out of Food in the Last Year: Never true   Transportation Needs: No Transportation Needs (11/6/2024)     PRAPARE - Transportation      Lack of Transportation (Medical): No      Lack of Transportation (Non-Medical): No   Physical Activity: Insufficiently Active (11/6/2024)     Exercise Vital Sign      Days of Exercise per Week: 7 days      Minutes of Exercise per Session: 20 min   Stress: No Stress Concern Present (11/6/2024)     [employer] - Occupational Stress Questionnaire      Feeling of Stress : Not at all   Social Connections: Moderately Isolated (11/6/2024)     Social Connection and Isolation Panel [NHANES]      Frequency of Communication with Friends and Family: Twice a week      Frequency of Social Gatherings with Friends and Family: Three times a week      Attends [employer] Services: Never      Active Member of Clubs or Organizations: No      Attends Club or Organization Meetings: Never      Marital Status: Living with partner   Intimate Partner Violence: Not At Risk (11/6/2024)     Humiliation, Afraid, Rape, and Kick questionnaire      Fear of Current or Ex-Partner: No      Emotionally Abused: No      Physically Abused: No      Sexually Abused: No   Housing Stability: Low Risk  (11/6/2024)     Housing Stability Vital Sign      Unable to Pay for Housing in the Last Year: No      Number of Times Moved in the Last Year: 0      Homeless in the Last Year: No            Family History         Family History   Problem Relation Age of Onset    Diabetes Mother      Diabetes Father      Cancer Neg Hx              Allergies:  Azithromycin     Review of Systems:     Constitutional:          Negative for fever, chills, weight loss,   HENT:                                     Negative for  "hearing loss or tinnitus    Eyes:                           Negative for blurred vision, double vision, or loss of vision  Respiratory:               Negative for cough, hemoptysis, or wheezing    Cardiac:                      Negative for chest pain or palpitations or orthopnea  Vascular:                    Negative for claudication or rest pain   Gastrointestinal:       Negative for nausea, vomiting, or abdominal pain                                      Negative for hematochezia or melena   Genitourinary:           Negative for dysuria, frequency, or hematuria   Musculoskeletal:       Chronic right hip pain   Skin:                           Negative for itching or rash  Neurological:             Negative for dizziness, headaches, or tremors                                      Negative for speech disturbance                                      Negative for extremity weakness or paresthesias  Endo/Heme:               Negative for easy bruising or bleeding  Psychiatric:                Negative for depression, suicidal ideas, or hallucinations     Physical Exam:  /50 (BP Location: Left arm, Patient Position: Sitting, BP Cuff Size: Adult)   Pulse (!) 101   Ht 1.753 m (5' 9\")   Wt 92 kg (202 lb 13.2 oz)   SpO2 91%      Constitutional:          Alert, oriented, no acute distress  HEENT:                       Normocephalic and atraumatic, EOMI  Neck:                          Supple, no JVD, no bruits.  Cardiovascular:         Regular rate and rhythm  Pulmonary:                Good air entry bilaterally  Abdominal:                Soft, non-tender, non-distended                                      Aortic impulse not widened  Musculoskeletal:       No edema, no tenderness  Neurological:             CN II-XII grossly intact, no focal deficits  Skin:                           Skin is warm and dry. No rash noted.  Psychiatric:                Normal mood and affect.  Upper extremities:    Palpable bilateral radial " pulses with multiphasic flow.  Lower extremities:    Feet are warm, well-perfused, no ulceration.     Labs:  Reviewed.     Radiology:  Reviewed.     Assessment:  - Severe right internal carotid artery stenosis, asymptomatic.  - History of seizure.  - Tobacco use.  - COPD.  - Dyslipidemia.     Plan:  I had a long discussion with patient.  Study results were reviewed with him.  Since there is degree of stenosis is more than 70%, invasive intervention for the right carotid artery stenosis is indicated.  I discussed with patient the options of: Right carotid surgery, right carotid stenting, and no invasive intervention along with the pros and cons of all approaches.  After a long discussion, patient would like to proceed with right carotid surgery, fully understanding all risks which include, but not limited to, bleeding, infection, hyperperfusion syndrome, stroke, heart attack, and perioperative anesthetic complications.  I also told patient that following the surgery, he will have numbness around the incision and his right earlobe and the numbness may take up to 6 months to go away.  I advised patient to shave using electrical shaver instead of razor to avoid accidentally cutting himself due to the numbness.  Patient indicated understanding of the conversation and would like to proceed.  All questions were answered.     I counseled patient to stop smoking.

## 2025-07-17 ENCOUNTER — PATIENT OUTREACH (OUTPATIENT)
Dept: SCHEDULING | Facility: IMAGING CENTER | Age: 80
End: 2025-07-17
Payer: MEDICARE

## 2025-07-17 VITALS
RESPIRATION RATE: 16 BRPM | WEIGHT: 205.91 LBS | DIASTOLIC BLOOD PRESSURE: 37 MMHG | OXYGEN SATURATION: 90 % | SYSTOLIC BLOOD PRESSURE: 96 MMHG | BODY MASS INDEX: 30.5 KG/M2 | HEIGHT: 69 IN | HEART RATE: 66 BPM | TEMPERATURE: 98.1 F

## 2025-07-17 PROCEDURE — 700111 HCHG RX REV CODE 636 W/ 250 OVERRIDE (IP): Mod: JZ,TB | Performed by: SURGERY

## 2025-07-17 PROCEDURE — 700102 HCHG RX REV CODE 250 W/ 637 OVERRIDE(OP): Performed by: SURGERY

## 2025-07-17 PROCEDURE — 700105 HCHG RX REV CODE 258: Performed by: SURGERY

## 2025-07-17 PROCEDURE — A9270 NON-COVERED ITEM OR SERVICE: HCPCS | Performed by: SURGERY

## 2025-07-17 RX ORDER — HYDROCODONE BITARTRATE AND ACETAMINOPHEN 5; 325 MG/1; MG/1
1-2 TABLET ORAL EVERY 6 HOURS PRN
Qty: 10 TABLET | Refills: 0 | Status: SHIPPED | OUTPATIENT
Start: 2025-07-17 | End: 2025-07-22

## 2025-07-17 RX ADMIN — CEFAZOLIN 2 G: 2 INJECTION, POWDER, FOR SOLUTION INTRAVENOUS at 05:50

## 2025-07-17 RX ADMIN — ASPIRIN 81 MG: 81 TABLET, CHEWABLE ORAL at 05:53

## 2025-07-17 RX ADMIN — TELMISARTAN 20 MG: 40 TABLET ORAL at 04:15

## 2025-07-17 RX ADMIN — MONTELUKAST 10 MG: 10 TABLET, FILM COATED ORAL at 05:53

## 2025-07-17 RX ADMIN — FAMOTIDINE 20 MG: 20 TABLET, FILM COATED ORAL at 04:14

## 2025-07-17 RX ADMIN — HYDROCODONE BITARTRATE AND ACETAMINOPHEN 1 TABLET: 5; 325 TABLET ORAL at 05:52

## 2025-07-17 RX ADMIN — MOMETASONE FUROATE AND FORMOTEROL FUMARATE DIHYDRATE 2 PUFF: 200; 5 AEROSOL RESPIRATORY (INHALATION) at 04:18

## 2025-07-17 RX ADMIN — NICOTINE 14 MG: 14 PATCH TRANSDERMAL at 04:14

## 2025-07-17 ASSESSMENT — PAIN DESCRIPTION - PAIN TYPE
TYPE: ACUTE PAIN;SURGICAL PAIN

## 2025-07-17 NOTE — PROGRESS NOTES
The patient arrived to [facility] from PACU. Report received. Right neck JEMMA drain, CDI. The patient is resting in bed at this time.

## 2025-07-17 NOTE — PROGRESS NOTES
4 Eyes Skin Assessment Completed by LARON Bunch and Cheryle, RN.    Skin assessment is primarily focused on high risk bony prominences. Pay special attention to skin beneath and around medical devices, high risk bony prominences, skin to skin areas and areas where the patient lacks sensation to feel pain and areas where the patient previously had breakdown.     Head (Occipital):  L eye blindness   Ears (Under Medical Devices): WDL   Nose (Under Medical Devices): WDL   Mouth:  WDL   Neck: R side incision with gauze and transparent; JEMMA in place   Breast/Chest:  WDL   Shoulder Blades:  WDL   Spine:   Scar    (R) Arm/Elbow/Hand: Scarring covering forearm, Scab   (L) Arm/Elbow/Hand: Scarring covering forearm   Abdomen: Scar on L side   Pannus/Groin:  WDL   Sacrum/Coccyx:   Red and Blanching   (R) Ischial Tuberosity (Sit Bones):  WDL   (L) Ischial Tuberosity (Sit Bones):  WDL   (R) Leg:  WDL   (L) Leg:  WDL   (R) Heel:  Pink and Blanching   (R) Foot/Toe: Dry and Flaky; toenail discoloration   (L) Heel: Red and Blanching   (L) Foot/Toe:  Dry and Flaky; toenail discoloration       DEVICES IN USE:   Respiratory Devices:  Nasal cannula and Pulse ox  Feeding Devices:  N/A   Lines & BP Monitoring Devices:  Peripheral IV, BP cuff, and Pulse ox    Orthopedic Devices:  N/A  Miscellaneous Devices:  Drains and SCDs    PROTOCOL INTERVENTIONS:   Standard/Trauma Bed:  Already in place  Glide Sheet:  Already in place  Dri-Brody/Micro Climate Pads:  Already in place  Nasal Cannula with Gray Foams:  Already in place    WOUND PHOTOS:   N/A no wounds identified    WOUND CONSULT:   N/A, no advanced wound care needs identified

## 2025-07-17 NOTE — DISCHARGE SUMMARY
VASCULAR SURGERY SERVICE  _________________________________    DISCHARGE SUMMARY    Josh Pang  [id number]  [id number]  _____________________________________    DATE OF ADMISSION: 7/16/2025    DATE OF DISCHARGE:     ADMISSION DIAGNOSIS (ES):  Carotid stenosis, right [I65.21]  S/P carotid endarterectomy [Z98.890]    DISCHARGE DIAGNOSIS (ES):  Carotid stenosis, right [I65.21]  S/P carotid endarterectomy [Z98.890]    DISCHARGE CONDITION:  stable    CONSULTATIONS:  none    PROCEDURES:  7/16/2025  right carotid endarterectomy with bovine patch angioplasty    HOSPITAL COURSE:  Josh Pang is a 79 y.o. male who  underwent the above-mentioned procedure without complication and was admitted to [facility] postoperatively. he had an uneventful recovery and was discharged intact on postoperative day #1.    MEDICATIONS:  Current Outpatient Medications   Medication Sig Dispense Refill    HYDROcodone-acetaminophen (NORCO) 5-325 MG Tab per tablet Take 1-2 Tablets by mouth every 6 hours as needed (severe pain) for up to 5 days. 10 Tablet 0       FOLLOW-UP:  Please call our office at [phone] to make a postoperative appointment in 2 week(s)    DISCHARGE INSTRUCTIONS:  Resume preadmission diet.  Light activity for 4-6 weeks.  OK to shower and get incisions wet after 2 days.  Call or go to ER if you have chest pain, shortness of breath, lightheadedness, stroke-like symptoms, fever, worsening pain, or any other concerns.    Office address:   [facility]  [address]  [place] [address] [phone]  Fax [phone]

## 2025-07-17 NOTE — DISCHARGE PLANNING
Case Management Discharge Planning    Admission Date: 7/16/2025  GMLOS: 1.2  ALOS: 1    6-Clicks ADL Score:    6-Clicks Mobility Score:        Anticipated Discharge Dispo:  Discharge to home    DME Needed: No    Action(s) Taken: OTHER  @1021 Noted discharge order. Patient is 90% on 2l/nc. Voalte message to bedside RN asking if patient will need oxygen for home, on service with vendor for oxygen, or weaning.     Bedside RN is weaning oxygen and is to let me know if needs for patient. CM is not consulted at this time.   Escalations Completed: Bedside RN    Medically Clear: Yes    Next Steps: set up home oxygen if patient is not weaned to RA    Barriers to Discharge: None -at this time.   Anticipate patient will be weaned from oxygen.    Is the patient up for discharge tomorrow: No

## 2025-07-17 NOTE — PROGRESS NOTES
"                 VASCULAR SURGERY               Inpatient Progress Note  _________________________________    Vitals   BP (!) 96/37 Comment: Rn notified   Pulse 66   Temp 36.7 °C (98.1 °F) (Temporal)   Resp 16   Ht 1.753 m (5' 9\")   Wt 93.4 kg (205 lb 14.6 oz)   SpO2 90%   BMI 30.41 kg/m²   _________________________________    7/17/2025  Status post right carotid endarterectomy with bovine patch angioplasty  He is doing well today.  He reports mild incisional pain.  Otherwise denies any focal neurologic deficit or visual changes    Exam:  Pleasant male, no acute distress  Facial muscles intact, equal  Voice strong, speech clear and coherent  Right neck incision clean and dry, no hematoma.  JEMMA drain in place and functioning  Moving all extremities equally    A/P:  Right carotid stenosis, status post endarterectomy    Mr. Pang is doing well today.  His JEMMA drain was pulled at bedside this morning.  He is stable for discharge home today.  He will be prescribed Norco for postoperative pain, cautions and side effects were discussed with him.  Red flag symptoms were discussed with him today and he is aware to go to the nearest ED if any arise.  He will follow-up with us in 2 to 3 weeks for his postoperative appointment.    The patient was seen and examined with Dr. Latham who agrees with the above plan.    Mary Henderson P.A.-C.  [facility]  Voalte preferred or call my office [phone]  __________________________________________________________________  Patient:Josh Pang   MRN:[id number]   CSN:[id number]  "

## 2025-07-17 NOTE — CARE PLAN
The patient is Stable - Low risk of patient condition declining or worsening    Shift Goals  Clinical Goals: Pain management, q4 neuro checks, stroke education  Patient Goals: Pain management, sleep  Family Goals: Not present    Progress made toward(s) clinical / shift goals:  Patient pain being managed via pharmacological interventions per MAR. Q4 neuro checks in place; no changes noted. Patient educated on s/s of stroke, different types, risk factors, and provided a stroke booklet. Patient verbalized understanding.      Problem: Knowledge Deficit - Standard  Goal: Patient and family/care givers will demonstrate understanding of plan of care, disease process/condition, diagnostic tests and medications  Outcome: Progressing     Problem: Pain Management  Goal: Pain level will decrease to patient's comfort goal  Outcome: Progressing     Problem: Neuro Status  Goal: Neuro status will remain stable or improve  Outcome: Progressing

## 2025-07-17 NOTE — PROGRESS NOTES
Virtual Nurse rounding complete.  VRN portion of admit profile completed.  Requested UC bring caregiver POLO to patient bedside to sign.    Round Needs: No needs at this time.

## 2025-07-17 NOTE — PROGRESS NOTES
Discharge orders received.  Patient arrived to the discharge lounge.  PIV removed by floor RN. Meds to beds medications not ordered, medication sent to patient's home pharmacy.  Instructions given, medications reviewed and general discharge education provided to patient.  Follow up appointments discussed.  Patient verbalized understanding of dc instructions and prescriptions. Patient educated on signs and symptoms of infection, including: fever, site redness/swelling/warmth/tenderness/foul smelling drainage. If signs and symptoms of infection are noted patient to call 911 or come back to emergency room. Patient signed discharge instructions.  Patient verbalized he had all belongings with him, Denied having any home medications locked in our inpatient pharmacy that  he needs back. Patient ambulated with steady gait from discharge lounge to private vehicle. Patient left via car with spouse to home in stable condition.

## 2025-07-17 NOTE — PROGRESS NOTES
"Bedside report received.  Assessment complete.  A&O x 4. Patient calls appropriately.  Patient on 5L NC  Patient ambulates with standby hand held assist. Bed alarm on.   Patient has 0/10 pain. Patient declines pharmacological interventions at this time.  Denies N&V. Tolerating cardiac diet.  Surgical R neck JEMMA drain milked per protocol.  + void, - flatus, last BM 7/15 PTA.  Patient denies SOB.  SCD's on.  Patient educate on stroke materials; verbalized understanding; packet at bedside.  Reviewed plan with of care with patient. Call light and personal belongings within reach. Hourly rounding in place. All needs met at this time.    /45   Pulse (!) 59   Temp 37 °C (98.6 °F) (Temporal)   Resp 16   Ht 1.753 m (5' 9\")   Wt 93.4 kg (205 lb 14.6 oz)   SpO2 93%   BMI 30.41 kg/m²     "

## 2025-07-18 ENCOUNTER — TELEPHONE (OUTPATIENT)
Dept: HEALTH INFORMATION MANAGEMENT | Facility: OTHER | Age: 80
End: 2025-07-18
Payer: MEDICARE

## 2025-07-18 ENCOUNTER — TELEPHONE (OUTPATIENT)
Dept: VASCULAR LAB | Facility: MEDICAL CENTER | Age: 80
End: 2025-07-18
Payer: MEDICARE

## 2025-07-22 ENCOUNTER — OFFICE VISIT (OUTPATIENT)
Dept: VASCULAR LAB | Facility: MEDICAL CENTER | Age: 80
End: 2025-07-22
Attending: FAMILY MEDICINE
Payer: MEDICARE

## 2025-07-22 VITALS
WEIGHT: 203.4 LBS | DIASTOLIC BLOOD PRESSURE: 65 MMHG | HEIGHT: 69 IN | SYSTOLIC BLOOD PRESSURE: 119 MMHG | BODY MASS INDEX: 30.13 KG/M2 | HEART RATE: 60 BPM

## 2025-07-22 DIAGNOSIS — Z98.890 HISTORY OF RIGHT-SIDED CAROTID ENDARTERECTOMY: ICD-10-CM

## 2025-07-22 DIAGNOSIS — I70.203 ATHEROSCLEROSIS OF ARTERY OF BOTH LOWER EXTREMITIES (HCC): ICD-10-CM

## 2025-07-22 DIAGNOSIS — I65.21 CAROTID STENOSIS, ASYMPTOMATIC, RIGHT: Primary | ICD-10-CM

## 2025-07-22 DIAGNOSIS — I70.201 FEMORAL ARTERY OCCLUSION, RIGHT (HCC): ICD-10-CM

## 2025-07-22 DIAGNOSIS — F17.201 TOBACCO USE DISORDER, SEVERE, IN SUSTAINED REMISSION: ICD-10-CM

## 2025-07-22 DIAGNOSIS — I70.202 FEMORAL ARTERY OCCLUSION, LEFT (HCC): ICD-10-CM

## 2025-07-22 DIAGNOSIS — I73.9 PAD (PERIPHERAL ARTERY DISEASE) (HCC): ICD-10-CM

## 2025-07-22 PROCEDURE — 99213 OFFICE O/P EST LOW 20 MIN: CPT | Performed by: FAMILY MEDICINE

## 2025-07-22 ASSESSMENT — ENCOUNTER SYMPTOMS
WHEEZING: 0
TINGLING: 0
CLAUDICATION: 0
SEIZURES: 1
SHORTNESS OF BREATH: 0
HEADACHES: 0
CHILLS: 0
SPEECH CHANGE: 0
TREMORS: 0
SPUTUM PRODUCTION: 0
WEAKNESS: 0
FOCAL WEAKNESS: 0
PND: 0
BRUISES/BLEEDS EASILY: 0
BLOOD IN STOOL: 0
DIZZINESS: 0
SENSORY CHANGE: 0
ORTHOPNEA: 0
PALPITATIONS: 0
HEMOPTYSIS: 0
COUGH: 0
FEVER: 0

## 2025-07-22 ASSESSMENT — FIBROSIS 4 INDEX: FIB4 SCORE: 2.47

## 2025-07-22 NOTE — PROGRESS NOTES
FOLLOW-UP VASCULAR MEDICINE CLINIC   07/22/25     Josh Pang, ref for eval/mgmt of PAD, carotid dz, est 5/2025   Referring provider: Samina Acosta M.D. (neurology)    Subjective    Barriers to care / SDOH: none     Interval hx/concerns: last seen 5/2025, he is s/p R CEA on 7/16/15 and noting ongoing mild fatigue and R neck incisional pain.  Eating well but mild trouble with swallowing.  Pending postop f/u with vasc surg in early 8/2025.  Tolerating meds.   No new sx.   Had interval testing with LDL 56 and PRASANTH done.     Carotid artery disease, s/p R CEA (7/2025, Dr. Latham):  no current sx, no prior CVA    LOWER EXTREMITY PAD:  denies current CLTI including rest pain, nonhealing leg wounds, cold extremities, coloration changes  Initial visit history: seen by neurology 5/2/25 for sz d/o, noted to have prior 80% R carotid stenosis.  No hx of CVA/TIA reported.  No other new focal neuro s/s.    Active smoker  No other sx.  Pain-free walking distance: no limits   Maximum walking distance, prior to stopping due to symptoms: n/a   How long until pain subsides with rest: n/a   Pertinent PAD pmxh:  Initial visit hx: known hx of PAD, reports unclear knowledge of locations of stenoses, no prior vasc surg or other treatment that he can recall   Tobacco hx:  reports that he has been smoking cigarettes. He started smoking about 59 years ago. He has a 29.8 pack-year smoking history. He has never used smokeless tobacco.  Prior imaging studies:  Yes, Details: PRASANTH 2020  Current/ prior treatment:    Prescribed Exercise therapy: No   GDMT: partial    Surg Interventions: No     HTN: Stable, tolerating meds with good adherence, Home BP log: not checking     HLD: Stable, current treatment: High intensity statin - tolerating, good adherence     Dysglycemia: no    ANTITHROMBOTIC TX: aspirin 81mg daily  - no hx of major/minor bleeding      Medications Ordered Prior to Encounter[1]   Allergies[2]     Social History[3]  DIET AND  "EXERCISE:  Weight Change:stable   Diet: common adult  Exercise: no regular exercise program     Review of Systems   Constitutional:  Negative for chills, fever and malaise/fatigue.   HENT:  Negative for nosebleeds.    Respiratory:  Negative for cough, hemoptysis, sputum production, shortness of breath and wheezing.    Cardiovascular:  Negative for chest pain, palpitations, orthopnea, claudication, leg swelling and PND.   Gastrointestinal:  Negative for blood in stool and melena.   Genitourinary:  Negative for hematuria.   Neurological:  Positive for seizures (on meds). Negative for dizziness, tingling, tremors, sensory change, speech change, focal weakness, weakness and headaches.   Endo/Heme/Allergies:  Does not bruise/bleed easily.          Objective    Vitals:    07/22/25 0927   BP: 119/65   BP Location: Left arm   Patient Position: Sitting   BP Cuff Size: Adult   Pulse: 60   Weight: 92.3 kg (203 lb 6.4 oz)   Height: 1.753 m (5' 9\")      BP Readings from Last 4 Encounters:   07/22/25 119/65   07/17/25 (!) 96/37   06/18/25 128/58   06/10/25 122/60      Body mass index is 30.04 kg/m².   Wt Readings from Last 4 Encounters:   07/22/25 92.3 kg (203 lb 6.4 oz)   07/16/25 93.4 kg (205 lb 14.6 oz)   06/18/25 92.4 kg (203 lb 12.8 oz)   06/10/25 93.7 kg (206 lb 9.1 oz)      Physical Exam  Constitutional:       General: He is not in acute distress.     Appearance: Normal appearance. He is not diaphoretic.   HENT:      Head: Normocephalic and atraumatic.   Eyes:      Conjunctiva/sclera: Conjunctivae normal.   Neck:     Cardiovascular:      Rate and Rhythm: Normal rate and regular rhythm.      Pulses:           Carotid pulses are 2+ on the right side and 2+ on the left side.       Radial pulses are 2+ on the right side and 2+ on the left side.        Posterior tibial pulses are 1+ on the right side and 1+ on the left side.      Heart sounds: Normal heart sounds.      Comments: Distal ext warm, pink.  No wounds, cyanosis or " ruborous discoloration  Pulmonary:      Effort: Pulmonary effort is normal.      Breath sounds: Normal breath sounds.   Musculoskeletal:      Right lower leg: No edema.      Left lower leg: No edema.   Skin:     General: Skin is warm and dry.   Neurological:      Mental Status: He is alert and oriented to person, place, and time.      Cranial Nerves: No cranial nerve deficit.      Gait: Gait is intact.   Psychiatric:         Mood and Affect: Mood and affect normal.          DATA REVIEW    Lab Results   Component Value Date/Time    CHOLSTRLTOT 115 05/28/2025 07:05 AM    LDL 56 05/28/2025 07:05 AM    HDL 37 (A) 05/28/2025 07:05 AM    TRIGLYCERIDE 108 05/28/2025 07:05 AM       Lab Results   Component Value Date/Time    LDL 56 05/28/2025 07:05 AM    LDL 66 07/13/2023 06:39 AM    LDL 88 06/02/2022 06:29 AM    LDL 68 03/30/2021 11:30 AM     (H) 09/28/2017 06:11 AM       Lab Results   Component Value Date/Time    LIPOPROTA <6 05/28/2025 07:05 AM      Lab Results   Component Value Date/Time    APOB 57 (L) 05/28/2025 07:05 AM      Lab Results   Component Value Date/Time    CRPHIGHSEN 1.9 05/28/2025 07:05 AM       Lab Results   Component Value Date/Time    SODIUM 141 07/07/2025 08:29 AM    POTASSIUM 3.7 07/07/2025 08:29 AM    CHLORIDE 110 07/07/2025 08:29 AM    CO2 17 (L) 07/07/2025 08:29 AM    GLUCOSE 142 (H) 07/07/2025 08:29 AM    BUN 19 07/07/2025 08:29 AM    CREATININE 1.11 07/07/2025 08:29 AM    CREATININE 1.07 05/15/2012 07:54 AM    GFRCKD 67 07/07/2025 08:29 AM    BUNCREATRAT 13 05/15/2012 07:54 AM     Lab Results   Component Value Date/Time    ALKPHOSPHAT 57 03/02/2025 07:35 AM    ASTSGOT 23 03/02/2025 07:35 AM    ALTSGPT 14 03/02/2025 07:35 AM    TBILIRUBIN 0.5 03/02/2025 07:35 AM       Lab Results   Component Value Date/Time    HBA1C 5.8 (H) 07/13/2023 06:39 AM    HBA1C 5.6 06/02/2022 06:29 AM       Lab Results   Component Value Date/Time    MALBCRT 17 05/28/2025 07:04 AM    MICROALBUR 3.6 05/28/2025 07:04 AM       Lab Results   Component Value Date    WBC 12.2 (H) 2025    RBC 4.96 2025    HEMOGLOBIN 15.9 2025    HEMATOCRIT 47.2 2025    MCV 95.2 2025    MCH 32.1 2025    MCHC 33.7 2025    RDW 47.4 2025    PLATELETCT 197 2025    MPV 10.4 2025           IMAGIN Carotid    Antegrade flow, bilateral vertebral arteries.    Moderate right cervical internal carotid artery stenosis (50-69%).    Mild left cervical internal carotid artery stenosis (<50%).      PRASANTH    1.  Abnormal bilateral PRASANTH's consistent with moderate arterial    insufficiency.                  RIGHT    Waveform            Systolic BPs (mmHg)                              149           Brachial   Triphasic                                Common Femoral   Monophasic                 89            Posterior Tibial   Monophasic                 92            Dorsalis Pedis                                            Peroneal                              0.62          PRASANTH                        LEFT   Waveform        Systolic BPs (mmHg)                              137           Brachial   Triphasic                                Common Femoral   Monophasic                 92            Posterior Tibial   Monophasic                 111           Dorsalis Pedis                                            Peroneal                              0.74          PRASANTH     BLE art duplex    1.  Chronic occlusion of the Right proximal/mid SFA with reconstitution of    flow in the distal SFA.    2.  Chronic occlusion of the proximal Left SFA with reconstitution in the    mid SFA.    2024 CTA head   No significant stenosis, acute arterial occlusion, or aneurysm identified.    2024 CTA neck  FINDINGS:  Vasculature: There is an 80% analysis of the proximal right ICA. No additional carotid stenosis identified. Bilateral vertebral arteries are patent. Right vertebral artery is dominant.   IMPRESSION:   80%  proximal right ICA stenosis. Otherwise, negative.    11/2024 MR brain   1. Age-related cerebral atrophy.   2. Mild periventricular and juxtacortical white matter changes consistent with chronic microvascular ischemic gliosis.   3. No evidence of mesial temporal sclerosis, cortical dysplasia, or heterotopic gray matter.    6/2025 PRASANTH    Conclusions   Moderate arterial insufficiency                  RIGHT    Waveform            Systolic BPs (mmHg)                              139           Brachial   Monophasic                               Common Femoral   Monophasic                               Popliteal   Monophasic                 68            Posterior Tibial   Monophasic                 77            Dorsalis Pedis                                            Digit                              0.55          PRASANTH                        LEFT   Waveform        Systolic BPs (mmHg)                              130           Brachial   Monophasic                               Common Femoral   Monophasic                               Popliteal   Monophasic                 61            Posterior Tibial   Monophasic                 82            Dorsalis Pedis                                            Digit                              0.59          PRASANTH    6/2025 BLE art duplex    Bilateral proximal SFA occlusion with distal reconstitution.    Left distal SFA >75% stenosis.    Left peroneal occlusion with reconstitution.      PROCEDURES:     7/16/25   SURGEON:  Evelina Latham MD  POSTOPERATIVE DIAGNOSIS:  Severe right carotid artery stenosis.   PROCEDURES:  Right carotid endarterectomy with bovine patch angioplasty.          Medical Decision Making:  Today's Assessment / Status / Plan:     1. Carotid stenosis, asymptomatic, right  US-CAROTID DOPPLER BILAT      2. PAD (peripheral artery disease) (HCC)        3. Atherosclerosis of artery of both lower extremities (HCC)        4. Femoral artery occlusion, left ([facility])    "     5. Femoral artery occlusion, right (HCC)        6. History of right-sided carotid endarterectomy  US-CAROTID DOPPLER BILAT      7. Tobacco use disorder, severe, in sustained remission           Established CVD:      1) Carotid artery disease, R severe, s/p R CEA - asymptomatic, no prior CVA/TIA   - clearly tob, HTN, athero mediated   - severe stenosis carries 5yr stroke risk for up to 15% with reduction to <0.5%/yr with CEA and med mgmt    Surveillance   2019 duplex=   R mod / L mild   112/024 CTA = R 80% / L mild   Plan:  - at initial visit provided [facility] pt education handout on carotid artery dz   - continue med mgmt  - monitor for stroke-like s/s and get immediate attention   - initiate post-CEA surveillance as per institutional guidelines (or as directed by performing surgeon):  1) 3 months post-CEA (10/2025)  2) then annually x 2yrs, then Q2yrs thereafter, sooner if recurrent or progressive contralateral disease      2) LOWER EXTREMITY PAD (femoropopliteal) - chronic bilat SFA occlusions -  asymptomatic   - No indications of chronic limb threatening ischemia (CLTI) changes or hx of lifestyle-limiting symptoms   - PAD \"risk-amplifiers\": 75+ years old, ongoing tobacco use, polyvascular disease (2+ vascular beds), and microvascular disease (eg. retinopathy, neuropathy, nephropathy)  - Alex class:  I  - Change in Pain-free walking distance: STABLE  - Change in Maximum walking distance: STABLE  - change in 6MWT: N\A   SURVEILLANCE:  2021 PRASANTH = moderate reduction, bilat SFA occlusions with distal recon  Plan:  - as initial visit provided educ handouts on PAD, CLTI, exercise therapy  - as reviewed with patient, evidenced-based standard of care includes risk factor reduction, med mgmt, and exercise therapy with limited need for surgical intervention in majority of patients if there is CLTI or severe lifestyle limiting symptoms after >6mo of standard therapy   Lifestyle tx:  - continue lifestyle measures and " "tobacco cessation and/or complete avoidance strongly encouraged   - start/continue structured exercise therapy  - monitor change in pain-free and total walking distance to monitor progress  - consider 6MWT for formal progress measurement   Med tx:  - continue intensive med mgmt   - see antithrombotic plan below  - consider cilostazol 50 - 100mg BID if progressive claudication (only if no HF)  Imaging/surveillance   - check PRASANTH/art duplex now then annually   - consider CTA Ao with run-off   Other tx:  - may require eval for revasc with vasc surg if no response or worsening functionally impairing symptoms over time     3) CEREBROVASCULAR SMALL VESSEL DISEASE (CSVD) w/o hx of lacunar infarcts, microbleeds, ICH (per MR 2024 ) -  asymptomatic   - noted per prior neuroimaging, often termed \"chronic microvascular disease\"  - can manifest as mild cognitive dysfunction, dementia, mood disorders, motor and gait dysfunction, and urinary incontinence.   - It is a significant contributor to vascular cognitive impairment and dementia, accounting for a substantial proportion of these cases   - AHA/ASA highlight that CSVD accounts for 20-30% of ischemic strokes, generally as lacunar stroke syndromes and spontaneous ICH  Plan:  - avoid tobacco, control BP, continue antiplat therapy   - in general, no specific surveillance needed, unless new focal neuro s/s occur  - seek prompt attention if acute focal neuro s/s      BLOOD PRESSURE CONTROL   - stage 2 baseline with wide pulse pressure - indicative of worse overall prognosis due to arteriosclerosis  - PAD therapy should include RAASi agent   Office BP goal per ACC/AHA <130/80  Home BP at goal:  no  Office BP at goal:  no  24h ABPM:  not ordered to date   RDN candidate? YES  Contributing factors: tob, BMI   Plan:   - start/continue home BP monitoring, reviewed correct technique, provide BP log and instructions  - order 24h ABPM:  NO  - routine monitoring of lytes/gfr   Medications:  - " continue doxazosin 2mg BID   - continue telmisartan 20mg daily (PAD, hyperglycemia, BP)    LIPID MANAGEMENT  Qualifies for Statin Therapy per ACC/AHA Guidelines: yes, Secondary Prevention - Very high risk ASCVD - 2 major events or 1 event with other high-risk conditions  Major ASCVD events: Symptomatic PAD (hx of claudication with PRASANTH <0.85, previous revascularization/amputation) and Carotid plaque, >50% stenosis    High-risk conditions: >64yr old , Hypertension , and Current smoking   Lipoprotein(a): Ordered this visit  LIPID TARGETS / GOALS:  As of 7/2025?  LDL-C <55 mg/dl  approaching =  56  apoB <60 mg/dl  MET    Plan:   - continue atorva 80mg   - add zetia as next agent, is [id number] strategy candidate as well   - update labs      GLYCEMIC MANAGEMENT Prediabetic  Lab Results   Component Value Date/Time    HBA1C 5.8 (H) 07/13/2023 06:39 AM    HBA1C 5.6 06/02/2022 06:29 AM       Lab Results   Component Value Date/Time    MALBCRT 17 05/28/2025 07:04 AM    MICROALBUR 3.6 05/28/2025 07:04 AM       Plan:  - continue healthy diet, weight reduction, daily physical activity   - consider metformin initiation up to 850mg BID if A1c 6.2+ in future to reduce T2D progression  - monitor labs q6-12mo     ANTITHROMBOTIC TX: aspirin 81mg daily  - no hx of major/minor bleeding   - will strongly consider DPI with ASA + vasc dosed xarelto in future     LIFESTYLE INTERVENTIONS  TOBACCO: Stages of Change: Action (active change in process 0-6mo) has quit as of 7/15/25    reports that he has been smoking cigarettes. He started smoking about 59 years ago. He has a 29.8 pack-year smoking history. He has never used smokeless tobacco.   - Provided strong recommendation for complete cessation and informed this is the primary contributor to the majority of all ASCVD and cancer-related conditions and can result in significant morbidity and early mortality.   - gave educ handouts, reviewed OTC NRT options   - reviewed resources for cessation  including tobacco cessation clinic visit, pharmacotx meds, quit lines  - review at every visit   Provided 1 minutes of face-to-face counseling on above topics     PHYSICAL ACTIVITY: >150min/week of mod-intensity activity or as much as tolerated  NUTRITION: DASH , reduce Na to <1,500mg/day, and - handout provided   ETOH: to limit to occasional social use  WEIGHT MGMT: focus on 5-7% reduction as initial goal  (1kg loss reduces SBP by 1 mmHg)  - consider use of various wt reducing interventions and average wt loss potential:   Baseline diet/exercise (5-7+%)   And/or high-intensity meds (liraglutide (5-10%), semaglutide (12-15%), tirzepatide (15-20%))   And/org wt-reducing surgery (20-30+%)  - suggested for ongoing f/u with PCP to review above     OTHER:      # seizure d/o - stable on meds, seeing neurology     # COPD - ongoing symptoms on inhalers, seeing PCP and pulm MD   - strong rec to stop smoking     STUDIES:   10/2025 Carotid duplex ordered, RN to track     7/2026 PRASANTH/art duplex - not ordered   FOLLOW-UP: 3 months     Ezio Mcmillan M.D.   [facility]  [facility]  [phone]                [1]   Current Outpatient Medications on File Prior to Visit   Medication Sig Dispense Refill    HYDROcodone-acetaminophen (NORCO) 5-325 MG Tab per tablet Take 1-2 Tablets by mouth every 6 hours as needed (severe pain) for up to 5 days. 10 Tablet 0    nicotine (NICODERM) 14 MG/24HR PATCH 24 HR Place 1 Patch on the skin every 24 hours.      doxazosin (CARDURA) 2 MG Tab Take 1 tablet by mouth twice daily 200 Tablet 1    telmisartan (MICARDIS) 20 MG tablet Take 1 Tablet by mouth every day. to lower blood pressure 100 Tablet 3    Levetiracetam 750 MG TABLET SR 24 HR Take 750 mg by mouth every day for 180 days. 90 Tablet 1    montelukast (SINGULAIR) 10 MG Tab Take 1 tablet by mouth once daily 90 Tablet 1    atorvastatin (LIPITOR) 80 MG tablet Take 1 Tablet by mouth every  evening. 100 Tablet 3    aspirin (ASA) 81 MG Chew Tab chewable tablet Chew 1 Tablet every day. 100 Tablet 1    acetaminophen (TYLENOL) 325 MG Tab Take 325 mg by mouth 2 times a day.      fluticasone (FLONASE) 50 MCG/ACT nasal spray Administer 1 Spray into affected nostril(S) 2 times a day as needed (nasal).      famotidine (PEPCID) 20 MG Tab Take 20 mg by mouth 2 times a day.      Azelastine (ASTELIN) 137 MCG/SPRAY Solution Administer 1 Spray into affected nostril(S) every day. 30 mL 11    albuterol 108 (90 Base) MCG/ACT Aero Soln inhalation aerosol Inhale 2 Puffs every 6 hours as needed for Shortness of Breath. 18 g 11    fluticasone furoate-vilanterol (BREO ELLIPTA) 100-25 MCG/ACT AEROSOL POWDER, BREATH ACTIVATED Inhale 1 Puff every day. 180 Each 3    VITAMIN D PO Take 1 Tablet by mouth every day. (Patient not taking: Reported on 7/22/2025)      capsaicin (ZOSTRIX) 0.025 % cream Apply 1 Application topically 3 times a day. (Patient not taking: Reported on 7/22/2025)      celecoxib (CELEBREX) 200 MG Cap Take 200 mg by mouth 2 times a day as needed for Moderate Pain. (Patient not taking: Reported on 7/22/2025)       No current facility-administered medications on file prior to visit.   [2]   Allergies  Allergen Reactions    Azithromycin Hives     Rash, welts, hives   [3]   Social History  Tobacco Use    Smoking status: Every Day     Current packs/day: 0.50     Average packs/day: 0.5 packs/day for 59.6 years (29.8 ttl pk-yrs)     Types: Cigarettes     Start date: 1966    Smokeless tobacco: Never   Vaping Use    Vaping status: Never Used   Substance Use Topics    Alcohol use: Not Currently    Drug use: No

## 2025-07-23 ENCOUNTER — TELEPHONE (OUTPATIENT)
Dept: NEUROLOGY | Facility: MEDICAL CENTER | Age: 80
End: 2025-07-23
Payer: MEDICARE

## 2025-07-25 ENCOUNTER — TELEPHONE (OUTPATIENT)
Dept: NEUROLOGY | Facility: MEDICAL CENTER | Age: 80
End: 2025-07-25
Payer: MEDICARE

## 2025-07-28 ENCOUNTER — TELEPHONE (OUTPATIENT)
Dept: NEUROLOGY | Facility: MEDICAL CENTER | Age: 80
End: 2025-07-28
Payer: MEDICARE

## 2025-08-04 ENCOUNTER — OFFICE VISIT (OUTPATIENT)
Dept: NEUROLOGY | Facility: MEDICAL CENTER | Age: 80
End: 2025-08-04
Attending: STUDENT IN AN ORGANIZED HEALTH CARE EDUCATION/TRAINING PROGRAM
Payer: MEDICARE

## 2025-08-04 VITALS
BODY MASS INDEX: 30.31 KG/M2 | DIASTOLIC BLOOD PRESSURE: 68 MMHG | SYSTOLIC BLOOD PRESSURE: 118 MMHG | WEIGHT: 205.25 LBS

## 2025-08-04 DIAGNOSIS — R51.9 RIGHT-SIDED HEADACHE: ICD-10-CM

## 2025-08-04 DIAGNOSIS — I65.21 INTERNAL CAROTID ARTERY STENOSIS, RIGHT: ICD-10-CM

## 2025-08-04 DIAGNOSIS — Z91.89 AT RISK FOR OBSTRUCTIVE SLEEP APNEA: ICD-10-CM

## 2025-08-04 DIAGNOSIS — H33.22 LEFT RETINAL DETACHMENT: ICD-10-CM

## 2025-08-04 DIAGNOSIS — Z72.0 TOBACCO USE: ICD-10-CM

## 2025-08-04 DIAGNOSIS — R56.9 SEIZURE (HCC): Primary | ICD-10-CM

## 2025-08-04 PROCEDURE — 3078F DIAST BP <80 MM HG: CPT | Performed by: STUDENT IN AN ORGANIZED HEALTH CARE EDUCATION/TRAINING PROGRAM

## 2025-08-04 PROCEDURE — 3074F SYST BP LT 130 MM HG: CPT | Performed by: STUDENT IN AN ORGANIZED HEALTH CARE EDUCATION/TRAINING PROGRAM

## 2025-08-04 PROCEDURE — 99215 OFFICE O/P EST HI 40 MIN: CPT | Performed by: STUDENT IN AN ORGANIZED HEALTH CARE EDUCATION/TRAINING PROGRAM

## 2025-08-04 PROCEDURE — 99214 OFFICE O/P EST MOD 30 MIN: CPT | Performed by: STUDENT IN AN ORGANIZED HEALTH CARE EDUCATION/TRAINING PROGRAM

## 2025-08-04 RX ORDER — LAMOTRIGINE 100 MG/1
100 TABLET ORAL 2 TIMES DAILY
Qty: 60 TABLET | Refills: 1 | Status: SHIPPED | OUTPATIENT
Start: 2025-08-04 | End: 2025-10-03

## 2025-08-04 RX ORDER — LAMOTRIGINE 25 MG/1
TABLET ORAL
Qty: 182 TABLET | Refills: 0 | Status: SHIPPED | OUTPATIENT
Start: 2025-08-04 | End: 2025-09-29

## 2025-08-04 ASSESSMENT — PATIENT HEALTH QUESTIONNAIRE - PHQ9: CLINICAL INTERPRETATION OF PHQ2 SCORE: 0

## 2025-08-04 ASSESSMENT — FIBROSIS 4 INDEX: FIB4 SCORE: 2.47

## 2025-08-07 ENCOUNTER — OFFICE VISIT (OUTPATIENT)
Facility: MEDICAL CENTER | Age: 80
End: 2025-08-07
Payer: MEDICARE

## 2025-08-07 VITALS
HEIGHT: 69 IN | BODY MASS INDEX: 30.64 KG/M2 | HEART RATE: 83 BPM | WEIGHT: 206.9 LBS | OXYGEN SATURATION: 96 % | TEMPERATURE: 98.2 F | DIASTOLIC BLOOD PRESSURE: 58 MMHG | SYSTOLIC BLOOD PRESSURE: 114 MMHG

## 2025-08-07 DIAGNOSIS — Z98.890 S/P CAROTID ENDARTERECTOMY: Primary | ICD-10-CM

## 2025-08-07 PROCEDURE — 3078F DIAST BP <80 MM HG: CPT | Performed by: SURGERY

## 2025-08-07 PROCEDURE — 99024 POSTOP FOLLOW-UP VISIT: CPT | Performed by: SURGERY

## 2025-08-07 PROCEDURE — 3074F SYST BP LT 130 MM HG: CPT | Performed by: SURGERY

## 2025-08-07 ASSESSMENT — FIBROSIS 4 INDEX: FIB4 SCORE: 2.47

## 2025-08-13 DIAGNOSIS — J44.9 CHRONIC OBSTRUCTIVE PULMONARY DISEASE, UNSPECIFIED COPD TYPE (HCC): ICD-10-CM

## 2025-08-14 RX ORDER — ALBUTEROL SULFATE 90 UG/1
2 INHALANT RESPIRATORY (INHALATION) EVERY 6 HOURS PRN
Qty: 18 G | Refills: 0 | Status: SHIPPED | OUTPATIENT
Start: 2025-08-14

## (undated) DEVICE — NEEDLE NON SAFETY 25 GA X 1 1/2 IN HYPO (100EA/BX)

## (undated) DEVICE — SPONGE GAUZESTER. 2X2 4-PL - (2/PK 50PK/BX 30BX/CS)

## (undated) DEVICE — SYRINGE SAFETY TB 1 ML 27 GA X 1/2 IN (100/BX 4BX/CA)

## (undated) DEVICE — SUCTION INSTRUMENT YANKAUER BULBOUS TIP W/O VENT (50EA/CA)

## (undated) DEVICE — GLOVE BIOGEL SZ 7 SURGICAL PF LTX - (50PR/BX 4BX/CA)

## (undated) DEVICE — SYRINGE 10 ML CONTROL LL (25EA/BX 4BX/CA)

## (undated) DEVICE — SENSOR OXIMETER ADULT SPO2 RD SET (20EA/BX)

## (undated) DEVICE — SUTURE 4-0 SILK 12 X 18 INCH - (36/BX)

## (undated) DEVICE — LACTATED RINGERS INJ 1000 ML - (14EA/CA 60CA/PF)

## (undated) DEVICE — DRAPE MAGNETIC (INSTRA-MAG) - (30/CA)

## (undated) DEVICE — DRESSING TRANSPARENT FILM TEGADERM 2.375 X 2.75" (100EA/BX)"

## (undated) DEVICE — SUTURE 6-0 PROLENE BV-1 D/A 24 (36PK/BX)"

## (undated) DEVICE — GLOVE BIOGEL PI ORTHO SZ 7.5 PF LF (40PR/BX)

## (undated) DEVICE — SOD. CHL. INJ. 0.9% 1000 ML - (14EA/CA 60CA/PF)

## (undated) DEVICE — VESSELOOP MAXI BLUE STERILE- SURG-I-LOOP (10EA/BX)

## (undated) DEVICE — SLEEVE VASO DVT COMPRESSION CALF MED - (10PR/CA)

## (undated) DEVICE — SODIUM CHL. INJ. 0.9% 500ML (24EA/CA 50CA/PF)

## (undated) DEVICE — POLY UMBILICAL TAPE 1/8X30 - (36/BX)

## (undated) DEVICE — SUTURE 5-0 PROLENE BLUE C-1 HS 1 X 30 (36EA/BX)"

## (undated) DEVICE — ELECTRODE DUAL RETURN W/ CORD - (50/PK)

## (undated) DEVICE — SUTURE 3-0 SILK 12 X 18 IN - (36/BX)

## (undated) DEVICE — SET EXTENSION WITH 2 PORTS (48EA/CA) ***PART #2C8610 IS A SUBSTITUTE*****

## (undated) DEVICE — PACK AV FISTULA (2EA/CA)

## (undated) DEVICE — SYRINGE NON SAFETY 10 CC 21 GA X 1-1/2 IN (100/BX 4BX/CA)

## (undated) DEVICE — RESERVOIR SUCTION 100 CC - SILICONE (20EA/CA)

## (undated) DEVICE — DRAPE IOBAN II INCISE 23X17 - (10EA/BX 4BX/CA)

## (undated) DEVICE — Device

## (undated) DEVICE — DERMABOND ADVANCED - (12EA/BX)

## (undated) DEVICE — INSTRUMENT JAWCOVER SUTURE - BOOTS (5PK/BX)

## (undated) DEVICE — CHLORAPREP 26 ML APPLICATOR - ORANGE TINT(25/CA)

## (undated) DEVICE — CANISTER SUCTION 3000ML MECHANICAL FILTER AUTO SHUTOFF MEDI-VAC NONSTERILE LF DISP (40EA/CA)

## (undated) DEVICE — SUTURE 7-0 PROLENE BV-1 D/A 24 (36PK/BX)"

## (undated) DEVICE — WIPE SURGICAL INSTRUMENT VISIWIPE 2-7/8IN X 2-7/8IN (20EA/PK)

## (undated) DEVICE — COVER LIGHT HANDLE ALC PLUS DISP (18EA/BX)

## (undated) DEVICE — SUTURE CV

## (undated) DEVICE — GOWN WARMING STANDARD FLEX - (30/CA)

## (undated) DEVICE — SUTURE 3-0 ETHILON FS-1 - (36/BX) 30 INCH

## (undated) DEVICE — SHEET THYROID - (10EA/CA)

## (undated) DEVICE — TUBING CLEARLINK DUO-VENT - C-FLO (48EA/CA)

## (undated) DEVICE — GLOVE BIOGEL INDICATOR SZ 7.5 SURGICAL PF LTX - (50PR/BX 4BX/CA)

## (undated) DEVICE — SUTURE 4-0 MONOCRYL PLUS PS-2 - 27 INCH (36/BX)

## (undated) DEVICE — SUTURE 3-0 VICRYL PLUS SH - 27 INCH (36/BX)

## (undated) DEVICE — TRANSDUCER ADULT DISP. SINGLE BONDED STERILE - (10EA/CA)

## (undated) DEVICE — SYRINGE NON SAFETY 5 CC 21 GA X 1-1/2 IN (100/BX 4BX/CA)

## (undated) DEVICE — SYRINGE 30 ML LL (56/BX)

## (undated) DEVICE — DRAIN J-VAC 7MM FLAT - (10EA/CA)

## (undated) DEVICE — SUTURE GENERAL

## (undated) DEVICE — SET LEADWIRE 5 LEAD BEDSIDE DISPOSABLE ECG (1SET OF 5/EA)